# Patient Record
Sex: FEMALE | Race: BLACK OR AFRICAN AMERICAN | NOT HISPANIC OR LATINO | Employment: OTHER | URBAN - METROPOLITAN AREA
[De-identification: names, ages, dates, MRNs, and addresses within clinical notes are randomized per-mention and may not be internally consistent; named-entity substitution may affect disease eponyms.]

---

## 2017-09-25 ENCOUNTER — CONVERSION ENCOUNTER (OUTPATIENT)
Dept: GENERAL RADIOLOGY | Facility: HOSPITAL | Age: 67
End: 2017-09-25

## 2018-09-27 ENCOUNTER — CONVERSION ENCOUNTER (OUTPATIENT)
Dept: GENERAL RADIOLOGY | Facility: HOSPITAL | Age: 68
End: 2018-09-27

## 2018-10-25 ENCOUNTER — CONVERSION ENCOUNTER (OUTPATIENT)
Dept: GENERAL RADIOLOGY | Facility: HOSPITAL | Age: 68
End: 2018-10-25

## 2019-01-01 ENCOUNTER — HOSPITAL ENCOUNTER (OUTPATIENT)
Dept: CARDIAC REHAB | Facility: HOSPITAL | Age: 69
Setting detail: RECURRING SERIES
Discharge: HOME | End: 2019-02-01
Attending: SPECIALIST

## 2019-07-11 ENCOUNTER — HOSPITAL ENCOUNTER (OUTPATIENT)
Dept: CARDIOLOGY | Facility: HOSPITAL | Age: 69
Discharge: HOME | End: 2019-07-11

## 2019-08-15 ENCOUNTER — HOSPITAL ENCOUNTER (OUTPATIENT)
Dept: CARDIOLOGY | Facility: HOSPITAL | Age: 69
Discharge: HOME | End: 2019-08-15

## 2019-11-14 ENCOUNTER — HOSPITAL ENCOUNTER (OUTPATIENT)
Dept: GENERAL RADIOLOGY | Facility: HOSPITAL | Age: 69
Discharge: HOME | End: 2019-11-14
Attending: NURSE PRACTITIONER

## 2020-01-29 ENCOUNTER — HOSPITAL ENCOUNTER (OUTPATIENT)
Dept: GENERAL RADIOLOGY | Facility: HOSPITAL | Age: 70
Discharge: HOME | End: 2020-01-29
Attending: NURSE PRACTITIONER

## 2020-02-04 ENCOUNTER — HOSPITAL ENCOUNTER (OUTPATIENT)
Dept: ULTRASOUND IMAGING | Facility: HOSPITAL | Age: 70
Discharge: HOME | End: 2020-02-04
Attending: NURSE PRACTITIONER

## 2020-02-13 ENCOUNTER — OFFICE VISIT CONVERTED (OUTPATIENT)
Dept: OTHER | Facility: HOSPITAL | Age: 70
End: 2020-02-13
Attending: SURGERY

## 2020-02-13 ENCOUNTER — HOSPITAL ENCOUNTER (OUTPATIENT)
Dept: ONCOLOGY | Facility: HOSPITAL | Age: 70
Discharge: HOME | End: 2020-02-13
Attending: INTERNAL MEDICINE

## 2020-02-19 ENCOUNTER — HOSPITAL ENCOUNTER (OUTPATIENT)
Dept: MRI IMAGING | Facility: HOSPITAL | Age: 70
Discharge: HOME | End: 2020-02-19
Attending: SURGERY

## 2020-02-19 LAB
CREAT BLD-MCNC: 0.8 MG/DL (ref 0.6–1.4)
GFR SERPLBLD BASED ON 1.73 SQ M-ARVRAT: >60 ML/MIN/{1.73_M2}

## 2020-02-27 ENCOUNTER — HOSPITAL ENCOUNTER (OUTPATIENT)
Dept: PHYSICAL THERAPY | Facility: CLINIC | Age: 70
Setting detail: RECURRING SERIES
Discharge: HOME | End: 2020-06-16
Attending: SURGERY

## 2020-03-05 ENCOUNTER — HOSPITAL ENCOUNTER (OUTPATIENT)
Dept: MRI IMAGING | Facility: HOSPITAL | Age: 70
Discharge: HOME | End: 2020-03-05
Attending: SURGERY

## 2020-03-27 ENCOUNTER — HOSPITAL ENCOUNTER (OUTPATIENT)
Dept: PERIOP | Facility: HOSPITAL | Age: 70
Setting detail: HOSPITAL OUTPATIENT SURGERY
Discharge: HOME | End: 2020-03-27
Attending: SURGERY

## 2020-03-27 LAB
GLUCOSE BLD-MCNC: 138 MG/DL (ref 65–99)
GLUCOSE BLD-MCNC: 139 MG/DL (ref 65–99)

## 2020-04-06 ENCOUNTER — CONVERSION ENCOUNTER (OUTPATIENT)
Dept: SURGERY | Facility: CLINIC | Age: 70
End: 2020-04-06

## 2020-04-06 ENCOUNTER — OFFICE VISIT CONVERTED (OUTPATIENT)
Dept: SURGERY | Facility: CLINIC | Age: 70
End: 2020-04-06
Attending: SURGERY

## 2020-04-07 ENCOUNTER — HOSPITAL ENCOUNTER (OUTPATIENT)
Dept: PERIOP | Facility: HOSPITAL | Age: 70
Setting detail: HOSPITAL OUTPATIENT SURGERY
Discharge: HOME | End: 2020-04-07
Attending: SURGERY

## 2020-04-07 LAB
GLUCOSE BLD-MCNC: 103 MG/DL (ref 65–99)
GLUCOSE BLD-MCNC: 107 MG/DL (ref 65–99)

## 2020-04-16 ENCOUNTER — OFFICE VISIT CONVERTED (OUTPATIENT)
Dept: SURGERY | Facility: CLINIC | Age: 70
End: 2020-04-16
Attending: SURGERY

## 2020-04-22 ENCOUNTER — HOSPITAL ENCOUNTER (OUTPATIENT)
Dept: ONCOLOGY | Facility: HOSPITAL | Age: 70
Discharge: HOME | End: 2020-04-22
Attending: INTERNAL MEDICINE

## 2020-04-22 ENCOUNTER — OFFICE VISIT CONVERTED (OUTPATIENT)
Dept: ONCOLOGY | Facility: HOSPITAL | Age: 70
End: 2020-04-22
Attending: INTERNAL MEDICINE

## 2020-04-23 ENCOUNTER — HOSPITAL ENCOUNTER (OUTPATIENT)
Dept: RADIATION ONCOLOGY | Facility: HOSPITAL | Age: 70
Setting detail: RECURRING SERIES
Discharge: STILL ADMITTED OR ADMINISTRATIVE | End: 2020-07-01
Attending: RADIOLOGY

## 2020-04-24 LAB — 1,25(OH)2D3 SERPL-MCNC: 38.9 PG/ML (ref 19.9–79.3)

## 2020-05-22 ENCOUNTER — OFFICE VISIT CONVERTED (OUTPATIENT)
Dept: ONCOLOGY | Facility: HOSPITAL | Age: 70
End: 2020-05-22
Attending: INTERNAL MEDICINE

## 2020-05-22 ENCOUNTER — HOSPITAL ENCOUNTER (OUTPATIENT)
Dept: ONCOLOGY | Facility: HOSPITAL | Age: 70
Discharge: HOME | End: 2020-05-22
Attending: INTERNAL MEDICINE

## 2020-08-03 ENCOUNTER — OFFICE VISIT CONVERTED (OUTPATIENT)
Dept: ONCOLOGY | Facility: HOSPITAL | Age: 70
End: 2020-08-03
Attending: INTERNAL MEDICINE

## 2020-08-03 ENCOUNTER — HOSPITAL ENCOUNTER (OUTPATIENT)
Dept: ONCOLOGY | Facility: HOSPITAL | Age: 70
Discharge: HOME | End: 2020-08-03
Attending: INTERNAL MEDICINE

## 2020-08-10 ENCOUNTER — HOSPITAL ENCOUNTER (OUTPATIENT)
Dept: MAMMOGRAPHY | Facility: HOSPITAL | Age: 70
Discharge: HOME | End: 2020-08-10
Attending: INTERNAL MEDICINE

## 2020-09-25 ENCOUNTER — HOSPITAL ENCOUNTER (OUTPATIENT)
Dept: RADIATION ONCOLOGY | Facility: HOSPITAL | Age: 70
Discharge: HOME | End: 2020-09-25
Attending: RADIOLOGY

## 2020-10-06 ENCOUNTER — HOSPITAL ENCOUNTER (OUTPATIENT)
Dept: CARDIOLOGY | Facility: HOSPITAL | Age: 70
Discharge: HOME | End: 2020-10-06
Attending: SURGERY

## 2020-10-21 ENCOUNTER — OFFICE VISIT CONVERTED (OUTPATIENT)
Dept: RADIATION ONCOLOGY | Facility: HOSPITAL | Age: 70
End: 2020-10-21

## 2020-11-06 ENCOUNTER — HOSPITAL ENCOUNTER (OUTPATIENT)
Dept: CARDIAC REHAB | Facility: HOSPITAL | Age: 70
Setting detail: RECURRING SERIES
Discharge: HOME | End: 2020-11-06
Attending: SURGERY

## 2020-11-06 LAB
GLUCOSE BLD-MCNC: 179 MG/DL (ref 65–99)
GLUCOSE BLD-MCNC: 193 MG/DL (ref 65–99)

## 2020-11-09 LAB
GLUCOSE BLD-MCNC: 142 MG/DL (ref 65–99)
GLUCOSE BLD-MCNC: 168 MG/DL (ref 65–99)

## 2020-11-11 LAB
GLUCOSE BLD-MCNC: 103 MG/DL (ref 65–99)
GLUCOSE BLD-MCNC: 136 MG/DL (ref 65–99)

## 2020-11-16 LAB
GLUCOSE BLD-MCNC: 283 MG/DL (ref 65–99)
GLUCOSE BLD-MCNC: 302 MG/DL (ref 65–99)

## 2020-11-18 LAB
GLUCOSE BLD-MCNC: 151 MG/DL (ref 65–99)
GLUCOSE BLD-MCNC: 158 MG/DL (ref 65–99)

## 2020-11-23 LAB
GLUCOSE BLD-MCNC: 184 MG/DL (ref 65–99)
GLUCOSE BLD-MCNC: 196 MG/DL (ref 65–99)

## 2020-11-25 LAB
GLUCOSE BLD-MCNC: 146 MG/DL (ref 65–99)
GLUCOSE BLD-MCNC: 195 MG/DL (ref 65–99)

## 2020-12-02 LAB
GLUCOSE BLD-MCNC: 191 MG/DL (ref 65–99)
GLUCOSE BLD-MCNC: 227 MG/DL (ref 65–99)

## 2020-12-09 LAB
GLUCOSE BLD-MCNC: 145 MG/DL (ref 65–99)
GLUCOSE BLD-MCNC: 187 MG/DL (ref 65–99)

## 2020-12-14 ENCOUNTER — HOSPITAL ENCOUNTER (OUTPATIENT)
Dept: ONCOLOGY | Facility: HOSPITAL | Age: 70
Discharge: HOME | End: 2020-12-14
Attending: INTERNAL MEDICINE

## 2020-12-14 ENCOUNTER — OFFICE VISIT CONVERTED (OUTPATIENT)
Dept: RADIATION ONCOLOGY | Facility: HOSPITAL | Age: 70
End: 2020-12-14
Attending: INTERNAL MEDICINE

## 2020-12-14 LAB
GLUCOSE BLD-MCNC: 199 MG/DL (ref 65–99)
GLUCOSE BLD-MCNC: 235 MG/DL (ref 65–99)

## 2020-12-21 LAB
GLUCOSE BLD-MCNC: 132 MG/DL (ref 65–99)
GLUCOSE BLD-MCNC: 93 MG/DL (ref 65–99)

## 2020-12-23 LAB — GLUCOSE BLD-MCNC: 64 MG/DL (ref 65–99)

## 2021-01-20 LAB
GLUCOSE BLD-MCNC: 156 MG/DL (ref 65–99)
GLUCOSE BLD-MCNC: 197 MG/DL (ref 65–99)

## 2021-01-22 LAB
GLUCOSE BLD-MCNC: 204 MG/DL (ref 65–99)
GLUCOSE BLD-MCNC: 237 MG/DL (ref 65–99)

## 2021-01-29 LAB
GLUCOSE BLD-MCNC: 102 MG/DL (ref 65–99)
GLUCOSE BLD-MCNC: 141 MG/DL (ref 65–99)

## 2021-03-29 ENCOUNTER — HOSPITAL ENCOUNTER (OUTPATIENT)
Dept: MAMMOGRAPHY | Facility: HOSPITAL | Age: 71
Discharge: HOME | End: 2021-03-29
Attending: INTERNAL MEDICINE

## 2021-03-31 ENCOUNTER — HOSPITAL ENCOUNTER (OUTPATIENT)
Dept: RADIATION ONCOLOGY | Facility: HOSPITAL | Age: 71
Discharge: HOME | End: 2021-03-31
Attending: NURSE PRACTITIONER

## 2021-04-09 ENCOUNTER — HOSPITAL ENCOUNTER (OUTPATIENT)
Dept: GENERAL RADIOLOGY | Facility: HOSPITAL | Age: 71
Discharge: HOME | End: 2021-04-09
Attending: NURSE PRACTITIONER

## 2021-05-10 NOTE — H&P
History and Physical      Patient Name: Gini Edmondson   Patient ID: [id number]   Sex: Female   YOB: 1950    Primary Care Provider: Cheyenne Russell NP   Referring Provider: Cheyenne Russell NP    Visit Date: April 16, 2020    Provider: Dahlia Allred MD   Location: [facility]   Location Address: [address] [place] [address]  [id number]   Location Phone: [phone]          Chief Complaint  · Follow Up Surgery      History Of Present Illness  Gini Edmondson is a 69 year old /Black female who is here today for follow up of: Breast Mass Removal Left Side.   She is doing well-status post surgery. Her re-excised margins were negative.       Past Medical History  Asthma; Chronic Obstructive Pulmonary Disease; Depression; Diabetes mellitus, type II; Goiter; Hypertension, essential; Raynaud's Syndrome         Past Surgical History  Cholecystectomy; Triple coronary bypass         Medication List  aspirin Oral; atorvastatin oral; biotin oral; Fish Oil oral; Hair,Skin and Nails oral; magnesium oral; meloxicam oral; metformin oral; metoprolol succinate oral; multivitamin oral; Novolog Flexpen 100 unit/mL Subcutaneous Insulin Pen; pentoxifylline 400 mg oral tablet extended release; ProAir HFA 90 mcg/Actuation Inhalation HFA Aerosol Inhaler; Senna Laxative 8.6 mg oral tablet; Stool Softener oral; vitamin B12-folic acid 500-400 mcg oral tablet; Vitamin D 1,000 unit Oral Capsule         Allergy List  SULFA (SULFONAMIDES)       Allergies Reconciled  Social History  *Denies Alcohol Use; Tobacco (Former)         Review of Systems  · Constitutional  o Denies  o : fever, chills  · Eyes  o Denies  o : yellowish discoloration of the eyes, eye pain  · HENT  o Denies  o : difficulty swallowing, hoarseness  · Breasts  o * See HPI  · Cardiovascular  o Denies  o : chest pain on exertion, irregular heart beats  · Respiratory  o Denies  o : shortness of breath,  "cough  · Gastrointestinal  o Denies  o : nausea, vomiting, diarrhea, constipation  · Integument  o Admits  o : see HPI for surgical incision healing  · Neurologic  o Denies  o : tingling or numbness, loss of balance  · Musculoskeletal  o Denies  o : joint pain, back pain  · Endocrine  o Denies  o : weight gain, weight loss  · All Others Negative      Vitals  Date Time BP Position Site L\R Cuff Size HR RR TEMP (F) WT  HT  BMI kg/m2 BSA m2 O2 Sat HC       04/16/2020 10:23 AM       16  185lbs 16oz 5'  2\" 34.02 1.92           Physical Examination  · Constitutional  o Appearance  o : well developed/well nourished patient in no apparent distress  · Respiratory  o Inspection of Chest  o : equal breaths bilaterally  · Gastrointestinal  o Abdominal Examination  o : soft/nontender, nondistended, no organomegaly appreciated  · Post Surgical Incision  o Surgical wound  o : healing well, no erythema          Assessment  · Postoperative Exam Following Surgery     V67.00/Z09      Plan  · Medications  o Medications have been Reconciled  o Transition of Care or Provider Policy  · Instructions  o Return to office with any issues.  o F/U PRN  o Will need to see oncology and rad onc            Electronically Signed by: Dahlia Allred MD -Author on April 16, 2020 02:16:05 PM  "

## 2021-05-10 NOTE — H&P
History and Physical      Patient Name: Gini Edmondson   Patient ID: [id number]   Sex: Female   YOB: 1950    Primary Care Provider: Cheyenne Russell NP   Referring Provider: Cheyenne Russell NP    Visit Date: April 6, 2020    Provider: Dahlia Allred MD   Location: [facility]   Location Address: [address] [place] [address]  [id number]   Location Phone: [phone]          Chief Complaint  · Follow Up Surgery  · Pre-Surgical History and Physical Examination for [facility]  · Consents for Surgery      History Of Present Illness  Gini Edmondson is a 69 year old /Black female who is here today for follow up of: left lumpectomy with sentinel node.   She is doing well-status post surgery. Pathology is negative nodes but a positive superior medial margin.       Past Medical History  Asthma; Chronic Obstructive Pulmonary Disease; Depression; Diabetes mellitus, type II; Goiter; Hypertension, essential; Raynaud's Syndrome         Past Surgical History  Cholecystectomy; Triple coronary bypass         Medication List  aspirin Oral; atorvastatin oral; biotin oral; Fish Oil oral; Hair,Skin and Nails oral; magnesium oral; meloxicam oral; metformin oral; metoprolol succinate oral; multivitamin oral; Novolog Flexpen 100 unit/mL Subcutaneous Insulin Pen; pentoxifylline 400 mg oral tablet extended release; ProAir HFA 90 mcg/Actuation Inhalation HFA Aerosol Inhaler; Senna Laxative 8.6 mg oral tablet; Stool Softener oral; vitamin B12-folic acid 500-400 mcg oral tablet; Vitamin D 1,000 unit Oral Capsule         Allergy List  SULFA (SULFONAMIDES)         Social History  *Denies Alcohol Use; Tobacco (Former)         Review of Systems  · Constitutional  o Denies  o : fever, chills  · Eyes  o Denies  o : yellowish discoloration of the eyes, eye pain  · HENT  o Denies  o : difficulty swallowing, hoarseness  · Cardiovascular  o Denies  o : chest pain on exertion, irregular  "heart beats  · Respiratory  o Denies  o : shortness of breath, cough  · Gastrointestinal  o Denies  o : nausea, vomiting, diarrhea, constipation  · Integument  o Admits  o : see HPI for surgical incision healing  · Neurologic  o Denies  o : tingling or numbness, loss of balance  · Musculoskeletal  o Denies  o : joint pain, back pain  · Endocrine  o Denies  o : weight gain, weight loss  · All Others Negative      Vitals  Date Time BP Position Site L\R Cuff Size HR RR TEMP (F) WT  HT  BMI kg/m2 BSA m2 O2 Sat HC       04/06/2020 09:07 AM       12  186lbs 8oz 5'  2\" 34.11 1.92           Physical Examination  · Constitutional  o Appearance  o : well developed/well nourished patient in no apparent distress  · Respiratory  o Inspection of Chest  o : equal breaths bilaterally  · Gastrointestinal  o Abdominal Examination  o : soft/nontender, nondistended, no organomegaly appreciated  · Post Surgical Incision  o Surgical wound  o : healing well, no erythema          Assessment  · Postoperative Exam Following Surgery     V67.00/Z09  · Preoperative Examination     V72.83      Plan  · Orders  o General Surgery Order (GENOR) - V67.00/Z09, V72.83 - 04/07/2020  · Medications  o Medications have been Reconciled  o Transition of Care or Provider Policy  · Instructions  o Return to office with any issues.  o ****Surgical Orders****  o ****Patient Status****  o RISK AND BENEFITS:  o Consent for surgery: Given these options, the patient has verbally expressed an understanding of the risks of surgery and finds these risks acceptable. We will proceed with surgery as soon as possible.  o Possible risks, complications, benefits, and alternatives to surgical or invasive procedure have been explained to patient and/or legal guardian.  o O.R. PREP: Per protocol  o IV: Per Anesthesia  o Please sign permit for: Re-excision of left breast superior medial margins  o Kefzol 2 grams IV on call to OR.  o The above History and Physical Examination has " been completed within 30 days of admission.            Electronically Signed by: Dahlia Allred MD -Author on April 6, 2020 09:45:02 AM

## 2021-05-15 VITALS — WEIGHT: 186 LBS | RESPIRATION RATE: 16 BRPM | HEIGHT: 62 IN | BODY MASS INDEX: 34.23 KG/M2

## 2021-05-15 VITALS — RESPIRATION RATE: 12 BRPM | BODY MASS INDEX: 34.32 KG/M2 | WEIGHT: 186.5 LBS | HEIGHT: 62 IN

## 2021-05-15 VITALS — WEIGHT: 189.5 LBS | RESPIRATION RATE: 16 BRPM | BODY MASS INDEX: 34.87 KG/M2 | HEIGHT: 62 IN

## 2021-05-28 VITALS
DIASTOLIC BLOOD PRESSURE: 71 MMHG | WEIGHT: 186.95 LBS | RESPIRATION RATE: 20 BRPM | BODY MASS INDEX: 34.4 KG/M2 | SYSTOLIC BLOOD PRESSURE: 144 MMHG | OXYGEN SATURATION: 95 % | OXYGEN SATURATION: 97 % | OXYGEN SATURATION: 98 % | BODY MASS INDEX: 34.23 KG/M2 | HEART RATE: 76 BPM | DIASTOLIC BLOOD PRESSURE: 85 MMHG | TEMPERATURE: 97.3 F | HEIGHT: 62 IN | HEART RATE: 100 BPM | HEART RATE: 87 BPM | HEIGHT: 62 IN | BODY MASS INDEX: 33.92 KG/M2 | SYSTOLIC BLOOD PRESSURE: 144 MMHG | WEIGHT: 184.3 LBS | SYSTOLIC BLOOD PRESSURE: 140 MMHG | TEMPERATURE: 97.9 F | WEIGHT: 187.17 LBS | RESPIRATION RATE: 16 BRPM | TEMPERATURE: 97.7 F | DIASTOLIC BLOOD PRESSURE: 67 MMHG

## 2021-05-28 VITALS
BODY MASS INDEX: 33.55 KG/M2 | DIASTOLIC BLOOD PRESSURE: 59 MMHG | TEMPERATURE: 97.3 F | SYSTOLIC BLOOD PRESSURE: 135 MMHG | HEART RATE: 83 BPM | RESPIRATION RATE: 18 BRPM | OXYGEN SATURATION: 96 % | WEIGHT: 183.42 LBS

## 2021-05-28 VITALS
OXYGEN SATURATION: 96 % | HEART RATE: 71 BPM | TEMPERATURE: 97.6 F | SYSTOLIC BLOOD PRESSURE: 123 MMHG | DIASTOLIC BLOOD PRESSURE: 61 MMHG | RESPIRATION RATE: 18 BRPM

## 2021-05-28 NOTE — PROGRESS NOTES
Patient: BENITA BETANCOURT     Acct: [id number]     Report: #[id number]  UNIT #: [id number]     : 1950    Encounter Date:2020  PRIMARY CARE: JEREMIAH DEVLIN  ***Signed***  --------------------------------------------------------------------------------------------------------------------  NURSE INTAKE      Visit Type      Established Patient Visit            Chief Complaint      BREAST CA HERE FOR F/U            Referring Provider/Copies To      Referring Provider:  ARLETTE DEAN MD      Primary Care Provider:  JEREMIAH DEVLIN      Copies To:   JEREMIAH DELVIN            History and Present Illness      Past Oncology Illness History      1) Breast Cancer: Invasive Lobular Carcinoma, Left UOQ, 1.3 cm; Grade 2, ER:     100%, AR: 10%, HER2: 0, Ki-67: 30%; Oncotype DX: 30            Treatment History:            1) s/p LEFT lumpectomy and SLND (3/27/20)      2) s/p re-excision (+) margin (1 mm residual dx found) (20)      3) Patient decision to forgo chemotherapy; plan for adjuvant XRT and AI therapy     (20)      4) Patient c/o signifcant hotflashes; ongoing Femara and XRT (20)      5) s/p XRT L breast: 4256 cGy (-20)      6) Ongoing Femara (8/3/20)            HPI - Oncology Interim      Ms. Betancourt comes in for f/u regarding several chronic issues:            1) Breast Cancer: Ms. Betancourt comes in for follow-up.  In the interim, she     reports that she completed adjuvant radiation therapy.  She comes in for     surveillance regarding treatment.  She denies fevers or chills or sweats.  She d    oes complain of asthenia.  She describes it as a feeling of tiredness.  It is of    mild severity.  No other modifying factors or associated signs or symptoms.            2) AI Therapy: Ms. Betancourt does complain of hot flashes and weight gain.  She     attributes it to treatment.  It is of mild severity.  No other modifying factors    or associated signs or symptoms.  She otherwise  denies significant arthralgias     or myalgias.            3) DM: Ms. Edmondson denies polyuria or polydipsia.            Clinical Trial Participant      No            ECOG Performance Status      1            PAST, FAMILY   Past Medical History      Past Medical History:  COPD, Depression, Diabetes Type 2, Heart Attack,     Hypertension      Hematology/Oncology (F):  Breast Cancer            Past Surgical History      Biopsy, Cataract Surgery (LEFT), Lumpectomy (LEFT BREAST)            TRIPLE BYPASS            Family History      Family History:  Breast Cancer (MATERNAL AND PATERNAAL AUNTS), Prostate Cancer     (FATHER)            PANCREATIC- BROTHER, LUPUS-SISTER; DIABETIC-FATHER, BROTHERS, AUNTS,      UNCLES, COUSINS            Social History      Lives independently:  No            Tobacco Use      Tobacco status:  Current every day smoker      Smoking packs/day:  0.5      Quit status:  Considering quitting            Alcohol Use      Alcohol intake:  None            Substance Use      Substance use:  Denies use            REVIEW OF SYSTEMS      General:  Admits: Fatigue;          Denies: Appetite Change, Fever, Night Sweats, Weight Gain, Weight Loss      Eye:  Denies Blurred Vision, Denies Corrective Lenses, Denies Diplopia, Denies     Vision Changes      ENT:  Denies Headache, Denies Hearing Loss, Denies Hoarseness, Denies Sore     Throat      Cardiovascular:  Admits Chest Pain; Denies Palpitations      Respiratory:  Denies: Cough, Coughing Blood, Productive Cough, Shortness of Air,    Wheezing      Gastrointestinal:  Denies Bloody Stools, Denies Constipation, Denies Diarrhea,     Denies Nausea/Vomiting, Denies Problem Swallowing, Denies Unable to Control     Bowels      Genitourinary:  Denies Blood in Urine, Denies Incontinence, Denies Painful     Urination      Musculoskeletal:  Admits Back Pain; Denies Muscle Pain, Denies Painful Joints      Integumentary:  Denies Itching, Denies Lesions, Denies Rash       Neurologic:  Denies Dizziness; Admits Numbness\Tingling (DIABETIC NEUROPATHY);     Denies Seizures      Psychiatric:  Denies Anxiety, Denies Depression      Endocrine:  Denies Cold Intolerance, Denies Heat Intolerance      Hematologic/Lymphatic:  Denies Bruising, Denies Bleeding, Denies Enlarged Lymph     Nodes      Reproductive:  Denies: Menopause, Heavy Periods, Pregnant, Still Menstruating            VITAL SIGNS AND SCORES      Vitals      Height 5 ft 2 in / 157.48 cm      Weight 184 lbs 4.873 oz / 83.6 kg      BSA 1.85 m2      BMI 33.7 kg/m2      Temperature 97.9 F / 36.61 C - Temporal      Pulse 100      Respirations 20      Blood Pressure 144/67 Sitting, Left Arm      Pulse Oximetry 95%, ROOM AIR            Pain Score      Experiencing any pain?:  No      Pain Scale Used:  Numerical      Pain Intensity:  0            Fatigue Score      Experiencing any fatigue?:  Yes      Fatigue (0-10 scale):  10            EXAM      General Appearance:  Positive for: Alert, Oriented x3      Eye:  Positive for: Anicteric Sclerae, PERRLA      HEENT:  Negative for: Scleral Icterus, Thrush      Neck:  Positive for: Supple      Respiratory:  Negative for: Rales, Rhochi      Abdomen/Gastro:  Positive for: Soft      Cardiovascular:  Positive for: RRR      Psychiatric:  Positive for: AAO X 3      Lower Extremities:  Negative for: Edema            PREVENTION      Hx Influenza Vaccination:  Yes      Date Influenza Vaccine Given:  Oct 1, 2019      Influenza Vaccine Declined:  No      2 or More Falls in Past Year?:  No      Fall Past Year with Injury?:  No      Hx Pneumococcal Vaccination:  No      Encouraged to follow-up with:  PCP regarding preventative exams.      Chart initiated by      LAMIN BURNETT            ALLERGY/MEDS      Allergies      Coded Allergies:             CILOSTAZOL (Verified  Allergy, Unknown, full body rash and itching,     5/22/20)           SULFA (SULFONAMIDE ANTIBIOTICS) (Verified  Allergy, Unknown, rash,  5/22/20)            Medications            Letrozole (Femara*) 2.5 Mg Tablet      2.5 MG PO QDAY, #90 TAB 3 Refills         Prov: CHRIS SIMENTAL         6/16/20       Atorvastatin Calcium (Lipitor*) 10 Mg Tablet      10 MG PO HS, #30 TAB 0 Refills         Reported         3/26/20       Aspirin Chew (Aspirin Baby) 81 Mg Tab.chew      81 MG PO QDAY, #30 TAB.CHEW 0 Refills         Reported         3/26/20       Cholecalciferol (Vitamin D3) (Vitamin D3) 1,000 Unit Tablet      1000 UNITS PO QDAY for 30 Days, #30 TAB         Reported         3/26/20       Biotin (Biotin) 5 Mg Tab      5 MG PO QDAY, #30 TAB         Reported         3/26/20       Magnesium Oxide (Magnesium Oxide) 500 Mg Capsule      500 MG PO QDAY, CAP         Reported         3/26/20       (Hair Skin Nails)   No Conflict Check               Reported         3/26/20       Metoprolol Tartrate (Metoprolol Tartrate) 25 Mg Tablet      25 MG PO BID, #60 TAB 0 Refills         Reported         5/31/18       Meloxicam (Meloxicam*) 7.5 Mg Tablet      7.5 MG PO HS, #30 TAB 0 Refills         Reported         5/31/18       Insulin Asp/Prt/Insulin Aspart (novoLOG Mix 70/30 FLEXPEN) 100 Unit/1 Ml     Insuln.pen      50 UNITS SUBQ BID INSULIN, #1 BOX 0 Refills         Reported         5/31/18       Cyanocobalamin (Vitamin B-12*) 500 Mcg Tab      500 MCG PO QDAY, #30 TAB         Reported         3/23/18       Omega-3 Fatty Acids/Fish Oil (Fish Oil 1000 Mg) 1 Each Capsule      1 GM PO QDAY, #30 CAP 0 Refills         Reported         3/23/18       metFORMIN HCl (Glucophage) 500 Mg Tablet      500 MG PO BID, #60 TAB 0 Refills         Reported         3/23/18       Docusate Sodium (Colace) 100 Mg Capsule      100 MG PO QDAY, #30 CAP 0 Refills         Reported         3/23/18       Albuterol Sulfate (Albuterol Sulfate) 1.25 Mg/3 Ml Vial.neb      1.25 MG INH Q6H PRN for SHORTNESS OF BREATH, #120 NEB 0 Refills         Reported         3/23/18       MDI-Albuterol (Proair HFA)  8.5 Gm Hfa.aer.ad      2 PUFFS INH Q4H PRN for SHORTNESS OF BREATH, #1 MDI 0 Refills         Reported         3/23/18       Pentoxifylline (Pentoxifylline) 400 Mg Tablet.er      400 MG PO BID, TAB.SR         Reported         3/23/18      Medications Reviewed:  No Changes made to meds            IMPRESSION/PLAN      Impression      1) Breast Cancer: Ms. Edmondson is a 70-year-old female with a history of breast     cancer.  She is tolerating aromatase inhibitor therapy adequately.  She will     continue on treatment as tolerated.  She will follow-up with radiation oncology     in 1 month.  She will follow-up with myself in roughly 4 months for surveillance    as is consistent with the NCCN guidelines.  She will call with any new or     worsening symptoms in the interim.  She indicates understanding and is amenable     to this plan.            2) AI Therapy: She is tolerating Femara treatment adequately.  However, I did     caution her that her current symptoms could be related to treatment, and it     would be reasonable to consider an alternative treatment such as Aromasin or     tamoxifen.  Her preference is to continue with her current treatment unchanged.     She will contact my office if she elects to change therapy.            3) DM: This is clinically stable.  She will follow with primary care for this.            Diagnosis      Diabetes         Type 2 diabetes mellitus without complication, unspecified whether long term        insulin use - E11.9         Diabetes mellitus type: type 2         Diabetes mellitus long term insulin use: unspecified long term insulin use        status         Diabetes mellitus complication status: without complication            Aromatase inhibitor use - Z79.811            Breast cancer         Malignant neoplasm of upper-outer quadrant of left breast in female, estrogen       receptor positive - C50.412, Z17.0         Breast location: upper outer quadrant of breast         Estrogen  receptor status: positive         Patient sex: female         Laterality: left            Notes      New Diagnostics      * Bone Densitometry DEXA, As Soon As Possible         Dx: Aromatase inhibitor use - Z79.811            Plan      1) Continue with Femara            2) f/u with XRT one month            3) RTC 4-5 months for surveillance            4) Bone Density; call with results            Patient Education      Patient Education Provided:  Yes            Electronically signed by CHRIS SIMENTAL  08/03/2020 10:48       Disclaimer: Converted document may not contain table formatting or lab diagrams. Please see [employer] [facility] for the authenticated document.

## 2021-05-28 NOTE — PROGRESS NOTES
"Patient: BENITA BETANCOURT     Acct: [id number]     Report: #[id number]  UNIT #: [id number]     : 1950    Encounter Date:2020  PRIMARY CARE: JEREMIAH DEVLIN  ***Signed***  --------------------------------------------------------------------------------------------------------------------  NURSE INTAKE      Visit Type      Established Patient Visit            Chief Complaint      BREAST CA            Referring Provider/Copies To      Referring Provider:  MICHELLE MERINO      Primary Care Provider:  JEREMIAH DEVLIN      Copies To:   JEREMIAH DEVLIN            History and Present Illness      Past Oncology Illness History      Breast Cancer: Invasive Lobular Carcinoma, Left UOQ, 1.3 cm; Grade 2, ER: 100%,     ND: 10%, HER2: 0, Ki-67: 30%; Oncotype DX: 30            Treatment History:      - s/p LEFT lumpectomy and SLND (3/27/20)      - s/p re-excision (+) margin (1 mm residual dx found) (20)      - completed adjuvant XRT L breast: 4256 cGy (-20)      - Started Femara May 2020      - DEXA 2020-normal bone density            HPI - Oncology Interim      Patient comes in today for routine follow-up regarding her history of breast     cancer.  She remains on Femara but complains of worsening side effects including    hot flashes that are on and off every day.  She is also \"getting mean\" and feels    like throwing things that her  at times.  On further questioning she     revealed that she used to be on Escitalopram (Lexapro) but this medication was     accidentally dropped from her medication list when she switched primary care     providers.  She since discovered that but decided 2 try staying off of it.  We     discussed how she may need 2 be back on an antidepressant medication.  She     certainly has underlying difficulties with depression which is only made worse     by her aromatase inhibitor.  I offered several options including continuing the     current treatment " "plan.  The second option was 2 switch 2 different aromatase     inhibitor.  The patient did not like this option because she states that she     would like a stick with \"the devil she knows\".  The third option I gave her was     a continue the same aromatase inhibitor and start an antidepressant medication.     Typically I start venlafaxine but the patient thought it best ago back on her     previous dose of Lexapro.            She is due for her annual screening mammogram.  The patient is aware that this     was coming up but has not had it scheduled by her primary care provider.            Clinical Trial Participant      No            ECOG Performance Status      1            PAST, FAMILY   Past Medical History      Past Medical History:  COPD, Depression, Diabetes Type 2, Heart Attack,     Hypertension      Hematology/Oncology (F):  Breast Cancer            Past Surgical History      Biopsy, Cataract Surgery, Lumpectomy            TRIPLE BYPASS            Family History      Family History:  Breast Cancer, Prostate Cancer            PANCREATIC- BROTHER, LUPUS-SISTER; DIABETIC-FATHER, BROTHERS, AUNTS,      UNCLES, COUSINS            Social History      Lives independently:  Yes      Occupation:  retired            Tobacco Use      Tobacco status:  Current every day smoker (1 pack every 3 days)      Smoking packs/day:  0.5            Alcohol Use      Alcohol intake:  None            Substance Use      Substance use:  Denies use            REVIEW OF SYSTEMS      General:  Admits: Fatigue, Weight Gain;          Denies: Appetite Change, Fever, Night Sweats, Weight Loss      Other      Hot flashes      Eye:  Denies Blurred Vision, Denies Corrective Lenses, Denies Diplopia, Denies     Vision Changes      ENT:  Denies Headache, Denies Hearing Loss, Denies Hoarseness, Denies Sore     Throat      Cardiovascular:  Denies Chest Pain, Denies Palpitations      Respiratory:  Denies: Cough, Coughing Blood, Productive Cough, " Shortness of Air,    Wheezing      Gastrointestinal:  Denies Bloody Stools, Denies Constipation, Denies Diarrhea,     Denies Nausea/Vomiting, Denies Problem Swallowing, Denies Unable to Control     Bowels      Genitourinary:  Denies Blood in Urine, Denies Incontinence, Denies Painful     Urination      Musculoskeletal:  Denies Back Pain, Denies Muscle Pain, Denies Painful Joints      Integumentary:  Denies Itching, Denies Lesions, Denies Rash      Neurologic:  Denies Dizziness; Admits Numbness\Tingling (FEET, DUE TO DIABETES);    Denies Seizures      Psychiatric:  Denies Anxiety; Admits Depression      Other      Irritability      Endocrine:  Denies Cold Intolerance, Denies Heat Intolerance      Hematologic/Lymphatic:  Denies Bruising, Denies Bleeding, Denies Enlarged Lymph     Nodes      Reproductive:  Denies: Menopause, Heavy Periods, Pregnant, Still Menstruating            VITAL SIGNS AND SCORES      Vitals      Weight 183 lbs 6.764 oz / 83.2 kg      Temperature 97.3 F / 36.28 C - Temporal      Pulse 83      Respirations 18      Blood Pressure 135/59 Sitting, Right Arm      Pulse Oximetry 96%, RM AIR            Pain Score      Experiencing any pain?:  No      Pain Scale Used:  Numerical      Pain Intensity:  0            Fatigue Score      Experiencing any fatigue?:  Yes      Fatigue (0-10 scale):  8            EXAM      General: Alert, cooperative, no acute distress      Eyes: Anicteric sclera, PERRLA      HEENT: Oropharynx clear, no exudates      Respiratory: CTAB, normal respiratory effort      Abdomen: Normal active bowel sounds, no tenderness, no distention      Cardiovascular: RRR, no murmur, no peripheral edema      Skin: Normal tone, no rash, no lesions      Psychiatric: Appropriate affect, intact judgment      Neurologic: No focal sensory or motor deficits, no weakness, numbness, dizziness      Musculoskeletal: Normal muscle strength, normal muscle tone      Extremities: No clubbing, cyanosis, or  deformities            PREVENTION      Hx Influenza Vaccination:  Yes      Date Influenza Vaccine Given:  Sep 1, 2020      Influenza Vaccine Declined:  No      2 or More Falls in Past Year?:  No      Fall Past Year with Injury?:  No      Hx Pneumococcal Vaccination:  Yes (10/20/2020 at PCP)      Encouraged to follow-up with:  PCP regarding preventative exams.      Chart initiated by      JOE SMITH MA            ALLERGY/MEDS      Allergies      Coded Allergies:             CILOSTAZOL (Verified  Allergy, Unknown, full body rash and itching,     12/14/20)           SULFA (SULFONAMIDE ANTIBIOTICS) (Verified  Allergy, Unknown, rash,     12/14/20)            Medications      Last Reconciled on 12/14/20 17:16 by MIRELLA ALBARADO      Escitalopram Oxalate (Escitalopram Oxalate*) 10 Mg Tablet      10 MG PO QDAY, #30 TAB 3 Refills         Prov: MIRELLA ALBARADO         12/14/20       MDI-Albuterol (Ventolin HFA) 8 Gm Hfa.aer.ad      2 PUFFS INH Q4H PRN for SHORTNESS OF BREATH, #1 MDI 0 Refills         Reported         11/6/20       MDI-Albuterol (Ventolin HFA) 8 Gm Hfa.aer.ad      1 PUFFS INH Q4H PRN for SHORTNESS OF BREATH, #1 MDI 0 Refills         Reported         11/6/20       SITagliptin (Januvia) 100 Mg Tablet      100 MG PO QDAY, #30 TAB 0 Refills         Reported         11/6/20       Letrozole (Femara*) 2.5 Mg Tablet      2.5 MG PO QDAY, #90 TAB 3 Refills         Prov: CHRIS SIMENTAL         6/16/20       Atorvastatin Calcium (Lipitor*) 10 Mg Tablet      20 MG PO HS, #30 TAB 0 Refills         Reported         3/26/20       Aspirin Chew (Aspirin Baby) 81 Mg Tab.chew      81 MG PO QDAY, #30 TAB.CHEW 0 Refills         Reported         3/26/20       Cholecalciferol (Vitamin D3) (Vitamin D3) 1,000 Unit Tablet      1000 UNITS PO QDAY for 30 Days, #30 TAB         Reported         3/26/20       Biotin (Biotin) 5 Mg Tab      5 MG PO QDAY, #30 TAB         Reported         3/26/20       Magnesium Oxide (Magnesium Oxide) 500 Mg  Capsule      500 MG PO QDAY, CAP         Reported         3/26/20       (Hair Skin Nails)   No Conflict Check               Reported         3/26/20       Metoprolol Tartrate (Metoprolol Tartrate) 25 Mg Tablet      25 MG PO BID, #60 TAB 0 Refills         Reported         5/31/18       Insulin Asp/Prt/Insulin Aspart (novoLOG Mix 70/30 FLEXPEN) 100 Unit/1 Ml     Insuln.pen      50 UNITS SUBQ BID INSULIN, #1 BOX 0 Refills         Reported         5/31/18       Cyanocobalamin (Vitamin B-12*) 500 Mcg Tab      500 MCG PO QDAY, #30 TAB         Reported         3/23/18       Omega-3 Fatty Acids/Fish Oil (Fish Oil 1000 Mg) 1 Each Capsule      1 GM PO QDAY, #30 CAP 0 Refills         Reported         3/23/18       Docusate Sodium (Colace) 100 Mg Capsule      100 MG PO QDAY, #30 CAP 0 Refills         Reported         3/23/18       Albuterol Sulfate (Albuterol Sulfate) 1.25 Mg/3 Ml Vial.neb      1.25 MG INH Q6H PRN for SHORTNESS OF BREATH, #120 NEB 0 Refills         Reported         3/23/18       MDI-Albuterol (Proair HFA) 8.5 Gm Hfa.aer.ad      2 PUFFS INH Q4H PRN for SHORTNESS OF BREATH, #1 MDI 0 Refills         Reported         3/23/18       Pentoxifylline (Pentoxifylline) 400 Mg Tablet.er      400 MG PO BID, TAB.SR         Reported         3/23/18      Medications Reviewed:  No Changes made to meds            IMPRESSION/PLAN      Diagnosis      Breast screening - Z12.39            Notes      New Medications      * ESCITALOPRAM OXALATE (Escitalopram Oxalate*) 10 MG TABLET: 10 MG PO QDAY #30         Dx: Breast screening - Z12.39      New Diagnostics      * Screening Mammo, SCHEDULED PROCEDURE         Dx: Breast screening - Z12.39            Plan      Left breast cancer: Diagnosed in March 2020 and treated with lumpectomy followed    by radiation and endocrine therapy.  Patient is tolerating Femara with the     exception of hot flashes and irritability.  She is also due for screening     mammogram which I will order now and  follow-up with her in 1 month.            Irritability: Likely secondary underlying depression as well as a side effect of    her endocrine therapy.  We will restart the patient back on her initial dose of     Lexapro at 10 mg daily.  I will follow-up with her regarding this issue in 1     month but asked that she see her primary care provider for management of     depression medications going forward.            Hot flashes: Secondary 2 endocrine therapy.  Antidepressant medication such as     Lexapro can likely help with this.  The typical medication would be venlafaxine     for hot flashes alone.  This is an option if she fails restarting Lexapro.            Patient Education      Patient Education Provided:  Yes            Electronically signed by MIRELLA ALBARADO  12/14/2020 17:16       Disclaimer: Converted document may not contain table formatting or lab diagrams. Please see [facility] for the authenticated document.

## 2021-05-28 NOTE — PROGRESS NOTES
Patient: BENITA BETANCOURT     Acct: [id number]     Report: #[id number]  UNIT #: [id number]     : 1950    Encounter Date:10/21/2020  PRIMARY CARE: JEREMIAH DEVLIN  ***Signed***  --------------------------------------------------------------------------------------------------------------------  Encounter Date:  Oct 21, 2020      Care Plan Delivery Method      Patient Visit            Care Plan Delivered to PCP Via      Care Plan Delivered to PCP Via:  Fax            Referrals      Advanced Care Planning (living will pack given to patient)            Education Material Provided on      Care Plan, Diagnosis, Support Groups ([facility])            [employer] Acuity Tool      Acuity:  2 (43 min)      Diagnosis:        left breast invasive lobular carcinoma            Nurse Navigator Intervention            Pt seen in Survivorship clinic today. Smoking cessation discussed with      patient. Also provided patient with contact information for counselor,      Salina Tamayo. Patient reports she is not interested in taking      antidepressants any longer. Reviewed care plan with patient and will fax a      copy to her primary care provider. Patient has no further questions. CAROLYN Denney RN            Time Spent Evaluating      43            Electronically signed by XAVI DENNEY RN  10/23/2020 10:36       Disclaimer: Converted document may not contain table formatting or lab diagrams. Please see [facility] for the authenticated document.

## 2021-05-28 NOTE — PROGRESS NOTES
Patient: BENITA BETANCOURT     Acct: [id number]     Report: #[id number]  UNIT #: [id number]     : 1950    Encounter Date:2020  PRIMARY CARE: JEREMIAH DEVLIN  ***Signed***  --------------------------------------------------------------------------------------------------------------------  NURSE INTAKE      Visit Type      Established Patient Visit            Chief Complaint      BREAST CANCER            Referring Provider/Copies To      Referring Provider:  ARLETTE DEAN MD      Primary Care Provider:  JEREMIAH DEVLIN      Copies To:   JEREMIAH DEVLIN            History and Present Illness      Past Oncology Illness History      1) Breast Cancer: Invasive Lobular Carcinoma, Left UOQ, 1.3 cm; Grade 2, ER:     100%, HI: 10%, HER2: 0, Ki-67: 30%; Oncotype DX: 30            Treatment History:            1) s/p LEFT lumpectomy and SLND (3/27/20)      2) s/p re-excision (+) margin (1 mm residual dx found) (20)      3) Patient decision to forgo chemotherapy; plan for adjuvant XRT and AI therapy     (20)            HPI - Oncology Interim      Ms. Betancourt comes in for f/u regarding several chronic issues:            1) Breast Cancer: Ms. Betancourt comes in for follow-up.  In the interim, she     reports that she underwent her initial lumpectomy and sentinel lymph node     dissection, followed by a reexcision due to a positive margin.  She comes in to     review the results and to discuss adjuvant therapy.  Overall, she reports that     she is recovering well from surgery.  She does report some discomfort in the     left breast.  It is of mild severity and improving.  No other modifying factors     or associated signs or symptoms.              2) HTN: Ms. Betancourt denies chest pain or diaphoresis.            3) DM: Ms. Betancourt denies polyuria or polydipsia.            Clinical Trial Participant      No            ECOG Performance Status      1            PAST, FAMILY   Past Medical History       Past Medical History:  COPD, Depression, Diabetes Type 2, Heart Attack,     Hypertension      Hematology/Oncology (F):  Breast Cancer            Past Surgical History      Biopsy, Cataract Surgery (LEFT), Lumpectomy (LEFT BREAST)            TRIPLE BYPASS            Family History      Family History:  Breast Cancer (MATERNAL AND PATERNAAL AUNTS), Prostate Cancer     (FATHER)            PANCREATIC- BROTHER, LUPUS-SISTER; DIABETIC-FATHER, BROTHERS, AUNTS,      UNCLES, COUSINS            Social History      Lives independently:  No            Tobacco Use      Tobacco status:  Current every day smoker      Smoking packs/day:  0.5      Quit status:  Considering quitting            Alcohol Use      Alcohol intake:  None            Substance Use      Substance use:  Denies use            REVIEW OF SYSTEMS      General:  Admits: Fatigue;          Denies: Appetite Change, Fever, Night Sweats, Weight Gain, Weight Loss      Eye:  Denies Blurred Vision, Denies Corrective Lenses, Denies Diplopia, Denies     Vision Changes      ENT:  Denies Headache, Denies Hearing Loss, Denies Hoarseness, Denies Sore     Throat      Cardiovascular:  Denies Chest Pain, Denies Palpitations      Respiratory:  Denies: Cough, Coughing Blood, Productive Cough, Shortness of Air,    Wheezing      Gastrointestinal:  Denies Bloody Stools, Denies Constipation, Denies Diarrhea,     Denies Nausea/Vomiting, Denies Problem Swallowing, Denies Unable to Control Markos    wels      Genitourinary:  Denies Blood in Urine, Denies Incontinence, Denies Painful     Urination      Musculoskeletal:  Denies Back Pain, Denies Muscle Pain, Denies Painful Joints      Other      SORE ON LEFT SIDE BREAST SINCE SURGERY      Integumentary:  Denies Itching, Denies Lesions, Denies Rash      Neurologic:  Denies Dizziness, Denies Numbness\Tingling, Denies Seizures      Psychiatric:  Denies Anxiety, Denies Depression      Endocrine:  Denies Cold Intolerance, Denies Heat Intolerance       Hematologic/Lymphatic:  Denies Bruising, Denies Bleeding, Denies Enlarged Lymph     Nodes      Reproductive:  Denies: Menopause, Heavy Periods, Pregnant, Still Menstruating            VITAL SIGNS AND SCORES      Vitals      Weight 187 lbs 2.729 oz / 84.9 kg      Temperature 97.3 F / 36.28 C - Temporal      Pulse 87      Respirations 16      Blood Pressure 144/85 Sitting, Left Arm      Pulse Oximetry 98%, ROOM AIR            Pain Score      Experiencing any pain?:  Yes (SORE ON LEFT SIDE BREAST SINCE SURGERY)      Pain Scale Used:  Numerical      Pain Intensity:  5            Fatigue Score      Experiencing any fatigue?:  Yes      Fatigue (0-10 scale):  10            EXAM      General Appearance:  Positive for: Alert, Oriented x3      Eye:  Positive for: Anicteric Sclerae, PERRLA      HEENT:  Negative for: Scleral Icterus, Thrush      Neck:  Positive for: Supple      Respiratory:  Negative for: Rales, Rhochi      Abdomen/Gastro:  Positive for: Soft;          Negative for: Hepatosplenomegaly      Cardiovascular:  Positive for: RRR;          Negative for: Murmur      Psychiatric:  Positive for: AAO X 3      Lower Extremities:  Negative for: Edema            PREVENTION      Hx Influenza Vaccination:  Yes      Date Influenza Vaccine Given:  Oct 1, 2019      Influenza Vaccine Declined:  No      2 or More Falls Past Year?:  No      Fall Past Year with Injury?:  No      Hx Pneumococcal Vaccination:  No      Encouraged to follow-up with:  PCP regarding preventative exams.      Chart initiated by      RACHELLE LANDEROS MA            ALLERGY/MEDS      Allergies      Coded Allergies:             CILOSTAZOL (Verified  Allergy, Unknown, full body rash and itching,     4/22/20)           SULFA (SULFONAMIDE ANTIBIOTICS) (Verified  Allergy, Unknown, rash, 4/22/20)            Medications            HYDROcodone-Acetaminophen 5-325 Mg (HYDROcodone-Acetaminophen 5-325 Mg) 1 Each     Tablet      2 TAB PO Q4H PRN for PAIN, #30 TAB          Prov: ARLETTE DEAN MD         4/7/20       HYDROcodone-Acetaminophen 5-325 Mg (Norco 5-325 Mg) 1 Each Tablet      2 TAB PO Q4H PRN for pain, #30 TAB 0 Refills         Prov: ARLETTE DEAN MD         3/27/20       Atorvastatin Calcium (Lipitor*) 10 Mg Tablet      10 MG PO HS, #30 TAB 0 Refills         Reported         3/26/20       Aspirin Chew (Aspirin Baby) 81 Mg Tab.chew      81 MG PO QDAY, #30 TAB.CHEW 0 Refills         Reported         3/26/20       Cholecalciferol (Vitamin D3) (Vitamin D3) 1,000 Unit Tablet      1000 UNITS PO QDAY for 30 Days, #30 TAB         Reported         3/26/20       Biotin (Biotin) 5 Mg Tab      5 MG PO QDAY, #30 TAB         Reported         3/26/20       Magnesium Oxide (Magnesium Oxide) 500 Mg Capsule      500 MG PO QDAY, CAP         Reported         3/26/20       (Hair Skin Nails)   No Conflict Check               Reported         3/26/20       Metoprolol Tartrate (Metoprolol Tartrate) 25 Mg Tablet      25 MG PO BID, #60 TAB 0 Refills         Reported         5/31/18       Meloxicam (Meloxicam*) 7.5 Mg Tablet      7.5 MG PO HS, #30 TAB 0 Refills         Reported         5/31/18       Insulin Asp/Prt/Insulin Aspart (novoLOG Mix 70/30 FLEXPEN) 100 Unit/1 Ml     Insuln.pen      50 UNITS SUBQ BID INSULIN, #1 BOX 0 Refills         Reported         5/31/18       Cyanocobalamin (Vitamin B-12*) 500 Mcg Tab      500 MCG PO QDAY, #30 TAB         Reported         3/23/18       Omega-3 Fatty Acids/Fish Oil (Fish Oil 1000 Mg) 1 Each Capsule      1 GM PO QDAY, #30 CAP 0 Refills         Reported         3/23/18       metFORMIN HCl (Glucophage) 500 Mg Tablet      500 MG PO BID, #60 TAB 0 Refills         Reported         3/23/18       Docusate Sodium (Colace) 100 Mg Capsule      100 MG PO QDAY, #30 CAP 0 Refills         Reported         3/23/18       Albuterol Sulfate (Albuterol Sulfate) 1.25 Mg/3 Ml Vial.neb      1.25 MG INH Q6H PRN for SHORTNESS OF BREATH, #120 NEB 0 Refills          Reported         3/23/18       MDI-Albuterol (Proair HFA) 8.5 Gm Hfa.aer.ad      2 PUFFS INH Q4H PRN for SHORTNESS OF BREATH, #1 MDI 0 Refills         Reported         3/23/18       Pentoxifylline (Pentoxifylline) 400 Mg Tablet.er      400 MG PO BID, TAB.SR         Reported         3/23/18      Medications Reviewed:  No Changes made to meds            IMPRESSION/PLAN      Impression      1) Breast Cancer: Ms. Edmondson is a 69-year-old female with a new diagnosis of     breast cancer.  I reviewed with her today Oncotype DX testing.  I reviewed with     her the benefit of adjuvant chemotherapy, and the standard recommendation per     NCCN guidelines to proceed with adjuvant chemotherapy.  We also discussed the     risks of chemotherapy and potential side effects.  Given her age and     comorbidities, she would be high risk for complications.  She reports that her     preference at this time is to forego chemotherapy and to proceed with adjuvant     radiation therapy and hormone therapy.  She understands the risks of a higher     chance of recurrent cancer which would be metastatic and incurable.  We then     discussed adjuvant aromatase number therapy.  I discussed potential side effects    including but not limited to arthralgias, hot flashes, worsening bone density,     and asthenia.  She indicates understanding and agrees to proceed.  She will     follow-up with radiation oncology as well.            2) HTN: She will follow with primary care for this.            3) DM: This is clinically stable.  We will follow this.            Diagnosis      Breast cancer, left         Malignant neoplasm of upper-outer quadrant of left breast in female, estrogen       receptor positive - C50.412, Z17.0         Breast location: upper outer quadrant of breast         Estrogen receptor status: positive         Patient sex: female            Hypertension         Essential hypertension - I10         Hypertension type: essential  hypertension            Diabetes         Type 1 diabetes mellitus without complication - E10.9         Diabetes mellitus type: type 1         Diabetes mellitus complication status: without complication            High risk medication use - Z79.899            Notes      New Medications      * Letrozole (Femara*) 2.5 MG TABLET: 2.5 MG PO QDAY #30      New Diagnostics      * Vitamin D Level, Routine         Dx: High risk medication use - Z79.899            Plan      1) Patient decision to forgo chemotherapy            2) Prescription for Femara given            3) f/u with Radiation Oncology            4) Increase Vitamin D to 2000 IU            5) RTC 1 month to assess tolerance            The total time the visit was 45 minutes.  Over 50% that time was spent in     face-to-face counseling reviewing the diagnosis, prognosis, risk and benefits     associated with chemotherapy, Oncotype DX results, aromatase inhibitor     treatment, and coordination of care.            Patient Education      Patient Education Provided:  Yes            Electronically signed by CHRIS SIMENTAL  04/22/2020 14:02       Disclaimer: Converted document may not contain table formatting or lab diagrams. Please see [employer] [facility] for the authenticated document.

## 2021-05-28 NOTE — PROGRESS NOTES
Patient: GINI EDMONDSON     Acct: [id number]     Report: #[id number]  UNIT #: [id number]     : 1950    Encounter Date:10/21/2020  PRIMARY CARE: JEREMIAH DEVLIN  ***Signed***  --------------------------------------------------------------------------------------------------------------------  Chief Complaint      BREAST CANCER            Referring Provider/Copies To      Referring Provider:  MICHELLE MERINO      Primary Care Provider:  JEREMIAH DEVLIN      Copies To:   ARLETTE DEAN MD; S[facility],[facility]                                     Radiation Oncology                                     [address] [place], [place] [address]                                        [phone]            ,            Dear Dr. Jeremiah Devlin,            Gini Edmondson was seen in our office on    10/21/20. The purpose of her     visit was to evaluate for wellness and any ongoing side effects related to her     cancer diagnosis and treatment as well as discuss the plan for recommended     follow up care.            The attached report is a summary of the treatment received by Ms. Edmondson to     date.   You will find the cancer SURVIVORSHIP TREATMENT SUMMARY AND CARE Plan     attached to this communication and it may also be found as part of the     electronic medical records in [facility].   This is in compliance with standards of    quality cancer care outlined by the Commission on Cancer ([place]), the American     Society of Clinical Oncology (ASCO), and the National Comprehensive Cancer     Network (NCCN).               Today, I discussed the treatment summary and plan of care in detail with     Gini Edmondson  and have provided a written copy of the plan.   She  was     advised to continue with regular follow-up care with Jeremiah Devlin treating PCP    in accordance with the national guidelines  while transitioning back to their PCP    for general healthcare needs.   If you have any questions regarding your     patient's survivorship care please feel free to contact me.   Thank you for     allowing me to participate in the care of your patient.               Warm Regards,            ANASTASIA Sigala, APRN, OCN      Oncology Survivorship Nurse Practitioner      O: [phone]      F: [phone]            Allergies      Coded Allergies:             CILOSTAZOL (Verified  Allergy, Unknown, full body rash and itching,     5/22/20)           SULFA (SULFONAMIDE ANTIBIOTICS) (Verified  Allergy, Unknown, rash, 5/22/20)            Medications      Last Reconciled on 10/21/20 14:53 by JACY WILEY [facility]      Letrozole (Femara*) 2.5 Mg Tablet      2.5 MG PO QDAY, #90 TAB 3 Refills         Prov: CHRIS SIMENTAL         6/16/20       Atorvastatin Calcium (Lipitor*) 10 Mg Tablet      10 MG PO HS, #30 TAB 0 Refills         Reported         3/26/20       Aspirin Chew (Aspirin Baby) 81 Mg Tab.chew      81 MG PO QDAY, #30 TAB.CHEW 0 Refills         Reported         3/26/20       Cholecalciferol (Vitamin D3) (Vitamin D3) 1,000 Unit Tablet      1000 UNITS PO QDAY for 30 Days, #30 TAB         Reported         3/26/20       Biotin (Biotin) 5 Mg Tab      5 MG PO QDAY, #30 TAB         Reported         3/26/20       Magnesium Oxide (Magnesium Oxide) 500 Mg Capsule      500 MG PO QDAY, CAP         Reported         3/26/20       (Hair Skin Nails)   No Conflict Check               Reported         3/26/20       Metoprolol Tartrate (Metoprolol Tartrate) 25 Mg Tablet      25 MG PO BID, #60 TAB 0 Refills         Reported         5/31/18       Meloxicam (Meloxicam*) 7.5 Mg Tablet      7.5 MG PO HS, #30 TAB 0 Refills         Reported         5/31/18       Insulin Asp/Prt/Insulin Aspart (novoLOG Mix 70/30 FLEXPEN) 100 Unit/1 Ml     Insuln.pen      50 UNITS SUBQ BID INSULIN, #1 BOX 0 Refills         Reported         5/31/18        Cyanocobalamin (Vitamin B-12*) 500 Mcg Tab      500 MCG PO QDAY, #30 TAB         Reported         3/23/18       Omega-3 Fatty Acids/Fish Oil (Fish Oil 1000 Mg) 1 Each Capsule      1 GM PO QDAY, #30 CAP 0 Refills         Reported         3/23/18       metFORMIN HCl (Glucophage) 500 Mg Tablet      500 MG PO BID, #60 TAB 0 Refills         Reported         3/23/18       Docusate Sodium (Colace) 100 Mg Capsule      100 MG PO QDAY, #30 CAP 0 Refills         Reported         3/23/18       Albuterol Sulfate (Albuterol Sulfate) 1.25 Mg/3 Ml Vial.neb      1.25 MG INH Q6H PRN for SHORTNESS OF BREATH, #120 NEB 0 Refills         Reported         3/23/18       MDI-Albuterol (Proair HFA) 8.5 Gm Hfa.aer.ad      2 PUFFS INH Q4H PRN for SHORTNESS OF BREATH, #1 MDI 0 Refills         Reported         3/23/18       Pentoxifylline (Pentoxifylline) 400 Mg Tablet.er      400 MG PO BID, TAB.SR         Reported         3/23/18            PAST, FAMILY   Past Medical History      COPD, Depression, Diabetes Type 2, Heart Attack, Hypertension      Hematology/oncology:  REPORTS HX OF: Breast cancer (left breast invasive lobular    carcinoma)            Past Surgical History      REPORTS HX OF: Cholecystectomy, Lumpectomy (left breast), Other Past Surgical Hx    (triple bipass March 2018)            Family History      REPORTS HX OF: Breast cancer (2 Aunts), Prostate cancer (Father), Other Cancer     History (Pancreatic- Brother)            Social History      Lives independently:  Yes      Occupation:  retired            Tobacco Use      Tobacco status:  Current every day smoker (1 pack every 3 days)      Smoking packs/day:  0.5            Alcohol Use      Alcohol intake:  None            Substance Use      Substance use:  Denies use            HISTORY OF PRESENT ILLNESS      Interim History      Treatment History Summary      Ms. Gini Edmondson is a pleasant 70-year-old white female with left hormone     receptor positive, HER-2/ted  negative, KI 6730% invasive lobular carcinoma. The     following summarizes her treatment journey.             On November 15, 2019 Ms. Edmondson underwent a screening mammography.  Findings     were positive for an ill-defined nodular asymmetry in the upper outer quadrant     of the left breast.            On January 29, 2020, Ms. Edmondson underwent a diagnostic mammogram which showed a    suspicious mass in the upper outer quadrant of the left breast.  An ultrasound     was performed correlating with the mammographic abnormality at the 7 o'clock     position of the left breast approximately 7 cm from the nipple, there was an     ill-defined 1.1 cm heterogenous mass with dense posterior shadowing.            On February 4, 2020, Ms. Edmondson underwent a left breast ultrasound guided core     biopsy.  Pathology, S-) indicated invasive lobular carcinoma, grade 2, 9     mm, estrogen receptor positive (100%), progesterone receptor positive (10%), Ki-    67 (30%), HER-2/ted negative.            On February 13, 2020 she was evaluated by Dr. Pankaj Ornelas medical     oncologist.            On February 19, 2020, Ms. Edmondson underwent a breast MRI.  The right breast was     unremarkable.  The left breast revealed a 2 x 2.5 cm mass in the upper outer     left breast at the proven invasive lobular carcinoma biopsy site.  Also noted     was a left 0.6 cm oval enhancing lesion just below the nipple line.            On March 5, 2020, Ms. Edmondson underwent a left breast MRI guided core needle     biopsy of a 0.6 cm oval enhancing lesion.  Pathology (S-) revealed benign    tissue with a cluster of a aprocrine cyst and usual ductal hyperplasia and     columnar cell change.            On March 27, 2020, Ms. Edmondson underwent a left lumpectomy with a sentinel lymph    node biopsy.  Pathology (S-) indicated a invasive lobular carcinoma, 13     mm, grade 2.  To positive margins anterior medial margin as well as the  superior    medial margin.  2 of 2 lymph nodes were negative ancillary studies or the same     as the biopsy.  Pathological staging pT1c pN0.             On April 8, 2020, Ms. Edmondson underwent reexcision of her margins of the left     breast.  Invasion of 1 mm at the 10 o'clock position of the left breast was note    d overall grade was 1 and it was a single focus.            On April 20, 2020, an Oncotype DX was obtained with a score of 30.            On April 22, 2020, Ms. Edmondson met with Dr. Ornelas and at that time a     decision was made to forego chemotherapy and plan for adjuvant radiation and     aromatase inhibitor.  She was started on Femara on April 22, 2020.            On April 23, 2020, Ms. Edmondson was evaluated by Dr. Tim Reynolds (radiation     oncology).            From May 14, 2020 - June 5, 2020, Mr. Edmondson received 16/16 fractions of     external beam radiation.  Total dose of 4.256 cGy.  She experienced grade 1 skin    reaction with mild erythema and some left axillary hyperpigmentation.              On August 10, 2020, Ms. Edmondson underwent a bone densitometry which revealed     normal lumbar spine and femur.            REVIEW OF SYSTEMS      General:  Complains of: Appetite change (increased), Fatigue (reports fatigue     has increased, 10 today), Night sweats, Weight gain (gain 30 pounds since     surgery in 2018)      Eyes:  Complains of: Corrective lenses; Denies: Blurred vision      Ears, nose, mouth, throat:  Denies: Headache, Visual Changes, Hearing loss      Cardiovascular:  Denies: Chest pain, Irregular heartbeat, Palpitations      Respiratory:  Complains of: Shortness of Air (COPD), Productive cough (white     thick secretions)      Gastrointestinal:  Complains of: Problem swallowing (comes and goes),     Constipation (chronic); Denies: Nausea      Kidney/Bladder:  Complains of: Frequent Urination; Denies: Painful Urination,     Change in urinary stream, Blood in urine,  Incontinence      Musculoskeletal:  Denies: Painful Joints, Muscle Pain      Skin:  DENIES: Jaundice, Easy Bleeding, Nail changes      Neurological:  Complains of: Dizziness (at times, comes and goes),     Numbness\Tingling (feet tingle)      Psychiatric:  Complains of: AAO X 3, Depression (adressed with primary care     yesterday per patient)      Endocrine:  Denies Diabetes (type II)Osteoporosis      Hematologic/lymphatic:  Denies: Bruising, Bleeding      Reproductive:  Complains of Menopause            VITAL SIGNS AND SCORES      Vitals      Temperature 97.6 F / 36.44 C      Pulse 71      Respirations 18      Blood Pressure 123/61 Standing, Left Arm      Pulse Oximetry 96%            Pain Score      Experiencing any pain?:  Yes      Pain Scale Used:  Numerical      Pain Intensity:  2      Pain Comment:        left breast has sharp pains that come and go, also has developed a deep      itch.            Fatigue Score      Experiencing any fatigue?:  Yes      Fatigue (0-10 scale):  10            PREVENTION      Hx Influenza Vaccination:  Yes      Date Influenza Vaccine Given:  Sep 1, 2020      Influenza Vaccine Declined:  No      Hx Pneumococcal Vaccination:  Yes (10/20/2020 at PCP)      Encouraged to follow-up with:  PCP regarding preventative exams.      Chart initiated by      CAROLYN Denney RN            IMPRESSION/PLAN      Impression      1.  Survivorship appointment      2.  Ms. Gini Edmondson is a pleasant 70-year-old white female with invasive     lobular carcinoma, estrogen receptor positive, progesterone receptor positive,     HER-2/ted negative with Ki-67 of 30% involving the left breast.  She is status     post lumpectomy and sentinel lymph node biopsy.  Her Oncotype DX score was 30.      She opted out of chemotherapy.  She is now status post external beam     radiotherapy and is on an aromatase inhibitor.            Plan - Survivorship Discussion      Survivorship Discussion      SURVEILLANCE:       -Survivorship care plan and treatment history was given to the patient and     reviewed.  Copy will be sent to each provider.              -Will continue active surveillance with Dr. Chavez.  Will resume annual     mammography, xext annual due in November 2020.  Additional imaging will be based    on clinical findings and symptomatic complaints.            -She will be seen by Dr. Chavez every 3-6-12 months for a total of five years     for history and physical, as well assessing for compliance of endocrine therapy     and assessing for toxicities.             -Reiterated side effects of Letrozole which include but are not limited to     vasomotor symptoms (hot flashes and night sweats), dyspareunia and loss of     libido, vaginal dryness and discharge (lesser extent versus tamoxifen),     osteoporosis, fracture and arthralgias. Less common side effects include risk of    stroke, MI and cataract formation and changes in lipid metabolism.             MONITORING FOR LATE EFFECTS:      -Discussed bone health.  Anastrozole may decrease bone density and strength by     blocking estrogen production in the adrenal gland.  Her last bone density was     August /2020 with normal bone density in lumbar spine and hip.  Recommended 500     mg twice a day of calcium and 2,000 IU - 5,000 IU of Vitamin D2 daily.             -Discussed cardiac screening.  Explained to her women continue to die from heart    disease moreso than cancer.  Has multiple risk factors for cardiovascular     disease, including her heavy smoking history, hypertension, hyperlipidemia,     diabetes and age.  She is treated for hypertension and hyperlipidemia.  History     of triple bypass surgery.  Smokes one pack of cigarettes per day.  Recommended     continuation of blood pressure monitoring as well as monitor of her of lipid     profile.  Recommended discussion of Calcium Coronary Scanning, she may need to     travel for this and may need to pay for out  "of pocket.  It is done at [facility]     (approximately $99.00). Recommended a plant based diet with fruits and     vegetables, whole grains and legumes rich in fiber (25 grams daily) and     nutrients.  Limit refined grains and sugary foods, proteins high in saturated     fats and avoidance of processed meats. Encouraged fats rich in Omega 3s and     limiting Omega 6s. . Per recommendations (Current Breast Cancer Reports 2016,     8(3) p 139-150) 150 minutes per week of moderate of 75 minutes per week of     vigorous aerobic exercise.  Muscle strengthening activities or resistance     exercise for all major muscle groups 2 days per week. Weight gain is seen in use    with Aromatase Inhibitors approximately 5 pounds. Discussed importance of     exercise and diet.             -She received radiation and is at an increased risk of rib cage fracture on side    treated. The risk is 5-10% additionally from her current risk.  This usually     occurs at the 5 year mariella.  She had very limited exposure to lung and does not     notice a difference in breathing, nor does she have a dry cough.  Did explain     may see a scar on lung from CT imaging. Limited exposure with heart due to the     utilization of deep inspiratory breath - hold (DIBH) for left sided breast     cancers in order to facilitate cardiac sparing.               -Denies lymphedema.  Discussed this could occur later in life.  Lina Padilla's     (Lymph Edema Therapist) phone number and information is included in her packet.             -Discussed sexual health, may use Replens or Astroglyde as needed.              -Denies fatigue.  Sleeps \"to much\".  Has CAD and COPD which both play a role in     her fatigue.  She is starting cardiac rehab and will do so for three months.             Mobility:  Denies mobility problems or falls.  Discussed safety proofing her     home, good lighting, remove loose rugs, hand rails on steps and tub.             -Cognitive function: Does " have problems with word finding. Discussed lumosity on    line.  Discussed smoking causes small vessel changes in brain as well.              -Pain: Denies            RISK REDUCTION:      -Resume annual mammogram screening due November 2020.             -Her biggest risk at this point is her smoking history.  Not only does this     increase her risk of lung cancer, but other cancers as well including     pancreatic, gallbladder, gastric, head and neck, kidney and bladder as well as     coronary artery disease and stroke. She smokes one to two packs per day. Has     used multiple modalities in past with several attempts to stop and has been     unsuccessful.  Discussed use of nicotine patches for maintenance nicotine and     lozenges for breakthrough. Discussed triggers such as coffee in the morning and     riding in the car.  Discussed interventions to decrease habits.  She is eligible    to participate in Low Dose Lung Cancer Screening yearly and for 15 years once     she stops completely.              -Recommended every 3-5 year evaluation by GYN or PCP pap screening. Unsure of     her last vaginal exam.               -Colonoscopy due every 10 years, last done in 2013.             -Discussed screenings for Hepatitis B and C. Recommended discussion with PCP.            -Discussed vaccinations.  Needs updating on her Tdap. Has had chicken pox and     needs Herpes Zoster vaccine. Thinks she has had prevnar and pneumovax.  Receives    influenza vaccine yearly.               -Will need repeat bone density in 2022.             PSYCHOSOCIAL:      -Discussed Distress thermometer (score 8).  Stressors identified revolved around    dealing with partner, family health issues, depression, worry, loss of interest     in activities.  Reinforced she is now in active surveillance and will be     monitored for reoccurrence of her breast cancer.  She has discussed her     depression with her PCP and was on antidepressant until  October 2019 when she     discontinued.  Admits she felt better on an antidepressant.  Encouraged to call     if any questions or concerns arise.  Resource booklet in packet includes names     and phone number of counselors if needed.  Encouraged her to call Porsche Schwarz LCSW also with any concerns.   Her phone number and contact information     was provided.             -She denies any issues with body image.              -Anxiety screen (PATY - 7 - score of 9). Recommended VISHAL Damian     [phone]. Written and given to patient.             -Depression screen (PHQ - score of 15).  Strongly encouraged her to discuss with    PCP or Salina Tamayo.  No SI or Hi.              -Social support discussed as above.  Informational care for [employer]     [employer] given to patient.             -Does have a living will. Packet from [employer] [facility] given to     patient.  Also can be located at [facility].            Mgmt of Disease and Treatment      Referrals:  Nutritionist (recommended for weight gain), Lymphedema Referral (has    seen in past, number givne. ), Physical Therapy Referral (is going to do cardiac    rehab.)      Recommend:  Weight Bearing Exercises, Proof home, Removal of  loose rugs, Good     lighting, Handle bars in tub            Patient Education      Tobacco Cessation Counseling:  for 3 - 10 minutes            Advanced Care Plan Discussion      Time Spent      13:00 - 14:05      Care Plan Given:  Yes            Electronically signed by MICHELLE MERINO onc  10/21/2020 14:54       Disclaimer: Converted document may not contain table formatting or lab diagrams. Please see [employer] for the authenticated document.

## 2021-06-24 ENCOUNTER — OFFICE VISIT (OUTPATIENT)
Dept: VASCULAR SURGERY | Facility: HOSPITAL | Age: 71
End: 2021-06-24

## 2021-06-24 VITALS
BODY MASS INDEX: 41.66 KG/M2 | HEIGHT: 55 IN | WEIGHT: 180 LBS | DIASTOLIC BLOOD PRESSURE: 70 MMHG | RESPIRATION RATE: 18 BRPM | TEMPERATURE: 98.2 F | HEART RATE: 82 BPM | SYSTOLIC BLOOD PRESSURE: 112 MMHG

## 2021-06-24 DIAGNOSIS — I73.9 PAD (PERIPHERAL ARTERY DISEASE) (HCC): Primary | ICD-10-CM

## 2021-06-24 PROBLEM — E11.9 DIABETES MELLITUS, TYPE II: Status: ACTIVE | Noted: 2021-06-24

## 2021-06-24 PROBLEM — I73.00 RAYNAUD'S SYNDROME: Status: ACTIVE | Noted: 2021-06-24

## 2021-06-24 PROBLEM — E04.9 GOITER: Status: ACTIVE | Noted: 2021-06-24

## 2021-06-24 PROBLEM — J45.909 ASTHMA: Status: ACTIVE | Noted: 2021-06-24

## 2021-06-24 PROBLEM — F32.A DEPRESSION: Status: ACTIVE | Noted: 2021-06-24

## 2021-06-24 PROBLEM — J44.9 CHRONIC OBSTRUCTIVE PULMONARY DISEASE: Status: ACTIVE | Noted: 2021-06-24

## 2021-06-24 PROBLEM — I10 HYPERTENSION, ESSENTIAL: Status: ACTIVE | Noted: 2021-06-24

## 2021-06-24 PROCEDURE — 99213 OFFICE O/P EST LOW 20 MIN: CPT | Performed by: NURSE PRACTITIONER

## 2021-06-24 PROCEDURE — G0463 HOSPITAL OUTPT CLINIC VISIT: HCPCS | Performed by: NURSE PRACTITIONER

## 2021-06-24 RX ORDER — PEN NEEDLE, DIABETIC 31 GX5/16"
1 NEEDLE, DISPOSABLE MISCELLANEOUS 2 TIMES DAILY
COMMUNITY
Start: 2021-06-01

## 2021-06-24 RX ORDER — INSULIN ASPART 100 [IU]/ML
50 INJECTION, SOLUTION INTRAVENOUS; SUBCUTANEOUS 2 TIMES DAILY
COMMUNITY
End: 2021-06-24

## 2021-06-24 RX ORDER — CHLORAL HYDRATE 500 MG
1 CAPSULE ORAL DAILY
COMMUNITY

## 2021-06-24 RX ORDER — DOCUSATE SODIUM 100 MG/1
1 CAPSULE, LIQUID FILLED ORAL TAKE AS DIRECTED
COMMUNITY

## 2021-06-24 RX ORDER — FOLIC ACID 0.8 MG
1 TABLET ORAL AS NEEDED
COMMUNITY

## 2021-06-24 RX ORDER — ATORVASTATIN CALCIUM 20 MG/1
1 TABLET, FILM COATED ORAL DAILY
COMMUNITY

## 2021-06-24 RX ORDER — LETROZOLE 2.5 MG/1
2.5 TABLET, FILM COATED ORAL DAILY
COMMUNITY
Start: 2021-04-07 | End: 2021-07-01 | Stop reason: SDUPTHER

## 2021-06-24 RX ORDER — SOY PROTEIN
1 POWDER (GRAM) ORAL DAILY
COMMUNITY
End: 2021-07-07

## 2021-06-24 RX ORDER — BLOOD SUGAR DIAGNOSTIC
1 STRIP MISCELLANEOUS 3 TIMES DAILY
COMMUNITY
Start: 2021-06-01

## 2021-06-24 RX ORDER — LANCETS
1 EACH MISCELLANEOUS 2 TIMES DAILY
COMMUNITY
Start: 2021-06-01

## 2021-06-24 RX ORDER — ALBUTEROL SULFATE 90 UG/1
1 AEROSOL, METERED RESPIRATORY (INHALATION) 2 TIMES DAILY
COMMUNITY

## 2021-06-24 RX ORDER — ESCITALOPRAM OXALATE 10 MG/1
10 TABLET ORAL DAILY
COMMUNITY
Start: 2021-05-18

## 2021-06-24 RX ORDER — SENNA PLUS 8.6 MG/1
1 TABLET ORAL DAILY
COMMUNITY

## 2021-06-24 RX ORDER — BIOTIN 1 MG
1 TABLET ORAL DAILY
COMMUNITY

## 2021-06-24 RX ORDER — NITROGLYCERIN 0.4 MG/1
1 TABLET SUBLINGUAL DAILY
COMMUNITY
Start: 2021-06-13 | End: 2022-09-15 | Stop reason: HOSPADM

## 2021-06-24 RX ORDER — PENTOXIFYLLINE 400 MG/1
1 TABLET, EXTENDED RELEASE ORAL 2 TIMES DAILY
COMMUNITY

## 2021-06-24 NOTE — PROGRESS NOTES
[facility] Vascular Surgery Office Follow Up Note     Date of Encounter: 06/24/2021     MRN Number: [id number]  Name: Gini Edmondson  Phone Number: [phone]     Referred By: Jose Armando Mcbride MD  PCP: Cheyenne Russell APRN    Chief Complaint:    Chief Complaint   Patient presents with   • Leg Pain     PAD       Subjective      History of Present Illness:  Gini Edmondson is a pleasant  70 y.o. female returns for follow-up visit for claudication symptoms of the bilateral lower extremities.  He was previously seen on 10/6/2020 by Dr. Mcbride who ordered physical therapy through the cardiovascular claudication rehabilitation program.  Ms. Edmondson attended rehabilitation and felt that it was successful but was only allowed a certain amount of visits due to insurance.  She reports that she likes to walk to a store just across the street from her home but the calf pain causes her to have to stop and rest multiple times.  Ms. Edmondson would like to try all measures before any surgical interventions.  She continues to smoke around 1 pack/week.  She has an extensive history with multiple medical comorbidities including diabetes myelitis and hyperlipidemia.  She does currently take aspirin and Lipitor.    Review of Systems:  ROS    I have reviewed the following portions of the patient's history: allergies, current medications, past family history, past medical history, past social history, past surgical history and problem list and confirm it's accurate.    Allergies:  Allergies   Allergen Reactions   • Sulfa Antibiotics Hives       Medications:    No current outpatient medications on file.    History:   Past Medical History:   Diagnosis Date   • Allergy to sunlight     PER PT   • Cancer of left breast (CMS/HCC)     INVASVIE LOBULAR CARCINOMA   • Cardiac disorder     DR. MALCOLM ANTHONY   • Condition not found     RIGHT EYE CATARACT (LT CATARACT REPAIRED)   • Constipation    • Heart attack (CMS/HCC)   "   3-22-18 NON STEMI, CABG 3-23-18   • Hx of bronchitis     SMOKES 5-7 CIGERETTS PER DAY   • Hx of CABG     3V CABG 2018   • Hypertension     MEDS CONTROL   • Neuropathy     IN FEETH FROM DM   • Raynaud's syndrome    • Status post left breast lumpectomy        Past Surgical History:   Procedure Laterality Date   • BREAST LUMPECTOMY      LEFT   • COLONOSCOPY     • LAPAROSCOPIC TUBAL LIGATION     • OTHER SURGICAL HISTORY      BILAT PAD LEGS   • OTHER SURGICAL HISTORY      CHIP IN LEFT BREAST, MARKER FOR SPOT   • OTHER SURGICAL HISTORY      LEFT KNEE REPAIR       Social History     Socioeconomic History   • Marital status:      Spouse name: Not on file   • Number of children: Not on file   • Years of education: Not on file   • Highest education level: Not on file   Tobacco Use   • Smoking status: Current Every Day Smoker   • Tobacco comment: 5-7 CIGARETTES   Vaping Use   • Vaping Use: Unknown        Family History   Problem Relation Age of Onset   • Prostate cancer Father    • Pancreatic cancer Brother    • Breast cancer Maternal Aunt    • Breast cancer Maternal Aunt        Objective     Physical Exam:  Vitals:    06/24/21 1348   BP: 112/70   BP Location: Left arm   Patient Position: Sitting   Pulse: 82   Resp: 18   Temp: 98.2 °F (36.8 °C)   TempSrc: Tympanic   Weight: 81.6 kg (180 lb)   Height: 62 cm (24.41\")      Body mass index is 212.41 kg/m².    Physical Exam  HENT:      Head: Atraumatic.   Musculoskeletal:         General: No swelling.      Cervical back: Neck supple.   Skin:     General: Skin is dry.      Findings: No erythema or rash.   Neurological:      Mental Status: She is alert and oriented to person, place, and time.     Lower extremities: Cool to touch, nonpalpable DP pulse.  No ulcers.      Imaging/Labs:    No radiology results for the last 30 days.       Assessment / Plan      Assessment / Plan:  Diagnoses and all orders for this visit:    1. PAD (peripheral artery disease) (CMS/[facility]) " (Primary)    Mrs. Edmondson has moderate to severe peripheral artery disease.  I have reviewed Dr. Mcbride's notes, previous test and discussed this with Ms. Edmondson.  After a long discussion we both agree that she would like to try physical therapy for claudication rehabilitation for her PAD before pursuing any surgical interventions.  I have reinforced the importance of smoking cessation and its implications.  She explains that she went from 1 pack every 2 days to 1 pack per week.  I will order the supervised exercise treatment through the cardiopulmonary rehab and she will follow up with Dr. Mcbride in 3 months to reevaluate.  I also advised that she may call the office sooner for an appointment if her symptoms should become worse.    Thank you for allowing me to participate in your patient's care.    Follow Up:   Return in about 3 months (around 9/24/2021) for Recheck.   Or sooner for any further concerns or worsening sign and symptoms. If unable to reach us in the office please dial 911 or go to the nearest emergency department.      Mihir LOUIE  [facility] Vascular Surgery

## 2021-06-25 DIAGNOSIS — I73.9 PAD (PERIPHERAL ARTERY DISEASE) (HCC): Primary | ICD-10-CM

## 2021-06-28 PROBLEM — C50.912 MALIGNANT NEOPLASM OF LEFT BREAST IN FEMALE, ESTROGEN RECEPTOR POSITIVE: Status: ACTIVE | Noted: 2020-03-27

## 2021-06-28 PROBLEM — Z17.0 MALIGNANT NEOPLASM OF LEFT BREAST IN FEMALE, ESTROGEN RECEPTOR POSITIVE: Status: ACTIVE | Noted: 2020-03-27

## 2021-07-01 ENCOUNTER — APPOINTMENT (OUTPATIENT)
Dept: RADIATION ONCOLOGY | Facility: HOSPITAL | Age: 71
End: 2021-07-01

## 2021-07-03 RX ORDER — LETROZOLE 2.5 MG/1
2.5 TABLET, FILM COATED ORAL DAILY
Qty: 90 TABLET | Refills: 1 | Status: SHIPPED | OUTPATIENT
Start: 2021-07-03 | End: 2022-01-10

## 2021-07-07 ENCOUNTER — OFFICE VISIT (OUTPATIENT)
Dept: RADIATION ONCOLOGY | Facility: HOSPITAL | Age: 71
End: 2021-07-07

## 2021-07-07 VITALS
HEART RATE: 94 BPM | SYSTOLIC BLOOD PRESSURE: 144 MMHG | HEIGHT: 62 IN | TEMPERATURE: 97.3 F | DIASTOLIC BLOOD PRESSURE: 69 MMHG | WEIGHT: 178.79 LBS | RESPIRATION RATE: 16 BRPM | BODY MASS INDEX: 32.9 KG/M2 | OXYGEN SATURATION: 94 %

## 2021-07-07 DIAGNOSIS — C50.512 MALIGNANT NEOPLASM OF LOWER-OUTER QUADRANT OF LEFT BREAST OF FEMALE, ESTROGEN RECEPTOR POSITIVE (HCC): Primary | ICD-10-CM

## 2021-07-07 DIAGNOSIS — Z17.0 MALIGNANT NEOPLASM OF LOWER-OUTER QUADRANT OF LEFT BREAST OF FEMALE, ESTROGEN RECEPTOR POSITIVE (HCC): Primary | ICD-10-CM

## 2021-07-07 PROBLEM — C50.912 MALIGNANT NEOPLASM OF LEFT BREAST IN FEMALE, ESTROGEN RECEPTOR POSITIVE: Status: RESOLVED | Noted: 2020-03-27 | Resolved: 2021-07-07

## 2021-07-07 PROCEDURE — 99212 OFFICE O/P EST SF 10 MIN: CPT | Performed by: NURSE PRACTITIONER

## 2021-07-07 PROCEDURE — G0463 HOSPITAL OUTPT CLINIC VISIT: HCPCS | Performed by: NURSE PRACTITIONER

## 2021-07-07 NOTE — PROGRESS NOTES
Follow Up Office Visit      Encounter Date: 07/07/2021   Patient Name: Gini Edmondson  YOB: 1950   Medical Record Number: [id number]   Primary Diagnosis: Malignant neoplasm of lower-outer quadrant of left breast of female, estrogen receptor positive (CMS/HCC) [C50.512, Z17.0]   Cancer Staging: Stage 1  No matching staging information was found for the patient.   Completion Date:  6/5/21    Chief Complaint:    Chief Complaint   Patient presents with   • Follow-up   • Breast Cancer       History of Present Illness: Gini Edmondson is a 70 y.o. female who is here today to be seen for follow up of Radiation Therapy. Returns for routine follow up on left ILC Stage 1 hormone receptor breast cancer status post lumpectomy with reexcision, external beam radiotherapy and currently on an AI. Her OncoDX score was (30) and she declined chemotherapy.  Is taking her AI.  Mammogram done on 3/29/21 to left breast with large seroma noted.  U/S doen on 4/9/21 with 9.7 cm mildly complex fluid collection compatible with post surgical seroma.  BDM done on 8/10/20 with normal bone density.  Continues to smoke.      Subjective      Review of Systems: Review of Systems   Constitutional: Positive for fatigue. Negative for appetite change.   Respiratory: Positive for cough and shortness of breath (hx of COPD).    Cardiovascular: Leg swelling: occurs more in the feet.   Gastrointestinal: Positive for constipation (takes daily stool softener). Negative for diarrhea.   Musculoskeletal: Positive for back pain (ongoing).   Neurological: Positive for dizziness (occasionally). Negative for headaches.   Psychiatric/Behavioral: The patient is not nervous/anxious.        The following portions of the patient's history were reviewed and updated as appropriate: allergies, current medications, past family history, past medical history, past social history, past surgical history and problem list.    Medications:     Current  Outpatient Medications:   •  Accu-Chek FastClix Lancets misc, Inject 1 Device under the skin into the appropriate area as directed 2 (Two) Times a Day. as directed, Disp: , Rfl:   •  Accu-Chek Guide test strip, 1 each by Other route 3 (Three) Times a Day. as directed, Disp: , Rfl:   •  albuterol sulfate HFA (ProAir HFA) 108 (90 Base) MCG/ACT inhaler, Inhale 1 puff 2 (Two) Times a Day., Disp: , Rfl:   •  ASPIRIN 81 PO, Take 1 tablet/day by mouth Daily., Disp: , Rfl:   •  atorvastatin (LIPITOR) 20 MG tablet, Take 1 tablet by mouth Daily., Disp: , Rfl:   •  B-D ULTRAFINE III SHORT PEN 31G X 8 MM misc, Inject 1 application under the skin into the appropriate area as directed 2 (Two) Times a Day., Disp: , Rfl:   •  Biotin 1000 MCG tablet, Take 1 tablet by mouth Daily., Disp: , Rfl:   •  docusate sodium (Stool Softener) 100 MG capsule, Take 1 tablet by mouth Take As Directed., Disp: , Rfl:   •  escitalopram (LEXAPRO) 10 MG tablet, Take 10 mg by mouth Daily., Disp: , Rfl:   •  insulin NPH-insulin regular (humuLIN 70/30,novoLIN 70/30) (70-30) 100 UNIT/ML injection, Inject 50 Units under the skin into the appropriate area as directed 2 (Two) Times a Day With Meals., Disp: , Rfl:   •  letrozole (FEMARA) 2.5 MG tablet, Take 1 tablet by mouth Daily., Disp: 90 tablet, Rfl: 1  •  Magnesium 500 MG capsule, Take 1 tablet by mouth As Needed., Disp: , Rfl:   •  metoprolol tartrate (LOPRESSOR) 25 MG tablet, Take 25 mg by mouth 2 (Two) Times a Day., Disp: , Rfl:   •  nitroglycerin (NITROSTAT) 0.4 MG SL tablet, Take 1 tablet by mouth Daily. USE AS DIRECTED, Disp: , Rfl:   •  Omega-3 Fatty Acids (fish oil) 1000 MG capsule capsule, Take 1 tablet by mouth Daily., Disp: , Rfl:   •  pentoxifylline (TRENtal) 400 MG CR tablet, Take 1 tablet by mouth 2 (two) times a day., Disp: , Rfl:   •  senna (SENOKOT) 8.6 MG tablet, Take 1 tablet by mouth Daily., Disp: , Rfl:   •  SITagliptin (Januvia) 100 MG tablet, Take 1 tablet by mouth Daily., Disp: ,  "Rfl:     Allergies:   Allergies   Allergen Reactions   • Sulfa Antibiotics Hives       ECOG (1) Restricted in physically strenuous activity, ambulatory and able to do work of light nature    Objective     Physical Exam:   Vital Signs:   Vitals:    07/07/21 1421   BP: 144/69   Pulse: 94   Resp: 16   Temp: 97.3 °F (36.3 °C)   TempSrc: Temporal   SpO2: 94%   Weight: 81.1 kg (178 lb 12.7 oz)   Height: 157.5 cm (62\")   PainSc: 0-No pain     Body mass index is 32.7 kg/m².     Physical Exam  Vitals and nursing note reviewed.   Constitutional:       General: She is not in acute distress.     Appearance: Normal appearance.   HENT:      Head: Normocephalic and atraumatic.      Mouth/Throat:      Mouth: Mucous membranes are moist.      Pharynx: Oropharynx is clear.   Eyes:      Extraocular Movements: Extraocular movements intact.      Conjunctiva/sclera: Conjunctivae normal.      Pupils: Pupils are equal, round, and reactive to light.   Cardiovascular:      Rate and Rhythm: Normal rate and regular rhythm.      Heart sounds: Normal heart sounds.   Pulmonary:      Effort: Pulmonary effort is normal.      Breath sounds: Normal breath sounds.   Chest:          Comments: Well healed incision to left breast with palpable seroma noted.  No masses or LA noted.   Right breast without skn changes, mass or LA.   No LUE   Musculoskeletal:         General: Normal range of motion.      Cervical back: Normal range of motion and neck supple.   Skin:     General: Skin is warm and dry.   Neurological:      General: No focal deficit present.      Mental Status: She is alert and oriented to person, place, and time. Mental status is at baseline.   Psychiatric:         Mood and Affect: Mood normal.         Radiographs: No radiology results for the last 90 days.   Pathology:   Labs:     Assessment / Plan      Impressions: Gini Edmondson is a pleasant 70 y.o. female with Stage 1, hormone receptor positive left ILC status post lumpectomy with " reexcision for positive margin, OncoType DX (30), declined chemotherapy.  Status post external beam radiotherapy and on an AI.     Assessment/Plan:   Diagnoses and all orders for this visit:    1. Malignant neoplasm of lower-outer quadrant of left breast of female, estrogen receptor positive (CMS/HCC) (Primary)           Follow Up:   1. Discussed importance of active surveillance.  Will not schedule follow up with rad onc since she is following with med onc and they are performing her H/P and prescribing her AI.  She voiced understanding and knows will need to keep follow up with Dr. Chavez.   No follow-ups on file.      Sincerely,        JACY Norman  [facility] Radiation Oncology   This document has been signed by JACY Sigala on July 7, 2021 15:13 EDT

## 2021-07-28 ENCOUNTER — TELEPHONE (OUTPATIENT)
Dept: ONCOLOGY | Facility: HOSPITAL | Age: 71
End: 2021-07-28

## 2021-07-28 NOTE — TELEPHONE ENCOUNTER
Caller: PT    Relationship to patient: SELF    Best call back number: [phone] OK TO LEAVE  MSG BUT PREFERS TO SPEAK TO SOMEONE    Chief complaint: APPT     Type of visit: F/U    Requested date: ANY IN THE AFTERNOON     If rescheduling, when is the original appointment: 6/28    Additional notes: PT PREFERS AFTERNOON APPT    PT HAS CALLED [facility] TO GET APPT SCHEDULED. PLZ CALL ASAP

## 2021-08-10 DIAGNOSIS — I73.9 PAD (PERIPHERAL ARTERY DISEASE) (HCC): Primary | ICD-10-CM

## 2021-09-14 ENCOUNTER — OFFICE VISIT (OUTPATIENT)
Dept: ONCOLOGY | Facility: HOSPITAL | Age: 71
End: 2021-09-14

## 2021-09-14 VITALS
OXYGEN SATURATION: 92 % | RESPIRATION RATE: 18 BRPM | BODY MASS INDEX: 32.82 KG/M2 | SYSTOLIC BLOOD PRESSURE: 135 MMHG | HEART RATE: 81 BPM | WEIGHT: 179.45 LBS | TEMPERATURE: 97 F | DIASTOLIC BLOOD PRESSURE: 64 MMHG

## 2021-09-14 DIAGNOSIS — Z17.0 MALIGNANT NEOPLASM OF LOWER-OUTER QUADRANT OF LEFT BREAST OF FEMALE, ESTROGEN RECEPTOR POSITIVE (HCC): ICD-10-CM

## 2021-09-14 DIAGNOSIS — Z12.31 ENCOUNTER FOR SCREENING MAMMOGRAM FOR MALIGNANT NEOPLASM OF BREAST: Primary | ICD-10-CM

## 2021-09-14 DIAGNOSIS — C50.512 MALIGNANT NEOPLASM OF LOWER-OUTER QUADRANT OF LEFT BREAST OF FEMALE, ESTROGEN RECEPTOR POSITIVE (HCC): ICD-10-CM

## 2021-09-14 PROCEDURE — 99214 OFFICE O/P EST MOD 30 MIN: CPT | Performed by: INTERNAL MEDICINE

## 2021-09-14 PROCEDURE — G0463 HOSPITAL OUTPT CLINIC VISIT: HCPCS | Performed by: INTERNAL MEDICINE

## 2021-09-14 RX ORDER — INSULIN ASPART 100 [IU]/ML
INJECTION, SUSPENSION SUBCUTANEOUS
COMMUNITY
Start: 2021-08-26 | End: 2022-04-18 | Stop reason: SDUPTHER

## 2021-09-14 NOTE — PROGRESS NOTES
Patient  Gini Edmondson    Location  [facility] HEMATOLOGY & ONCOLOGY    Chief Complaint  Breast Cancer (-fu)    Referring Provider: HARLEY Thomason  PCP: Cheyenne Russell APRN    Subjective          Oncology/Hematology History Overview Note   Breast Cancer: Invasive Lobular Carcinoma, Left UOQ, 1.3 cm; Grade 2, ER: 100%, AZ: 10%, HER2: 0, Ki-67: 30%; Oncotype DX: 30    Treatment History:  - s/p LEFT lumpectomy and SLND (3/27/20)  - s/p re-excision (+) margin (1 mm residual dx found) (4/7/20)  - completed adjuvant XRT L breast: 4256 cGy (5/14-6/5/20)  - Started Femara May 2020  - DEXA August 2020-normal bone density     Malignant neoplasm of lower-outer quadrant of left breast of female, estrogen receptor positive (CMS/HCC)   2/20/2020 Surgery    Surgery       Procedure:  Ultrasound guided core needle biopsy      Location:  1:00 o'clock position      Invasive lobular carcinoma  ER/AZ positive   Her 2 ted negative  Ki-67 30%     3/7/2020 Cancer Staged    Staging form: Breast, AJCC 8th Edition  - Pathologic stage from 3/7/2020: Stage IA (pT1, pN0, cM0, G2, ER+, AZ+, HER2-, Oncotype DX score: 30) - Signed by Chanel Almonte APRN on 7/7/2021     3/27/2020 Initial Diagnosis    Malignant neoplasm of left breast in female, estrogen receptor positive (CMS/HCC)     3/27/2020 Surgery    Surgery       Procedure:  Left breast lumpectomy with SLNB          Invasive lobular carcinoma, 13 mm, ER/AZ positive, her 2 ted negative, 2/2 SLN negative, positive superior medial margin       5/14/2020 - 6/5/2020 Radiation    Radiation OncologyTreatment Course:  Gini Edmondson received 4256 cGy in 16 fractions to left breast     7/7/2020 Surgery    Surgery       Procedure:  Reexcision of positive superior margin       Pathology revealed ILC single minute focus with negative margins.          History of Present Illness  Patient comes in today for routine follow-up after her breast cancer  diagnosis.  She had a left lumpectomy in 2020 and has been on Femara.  She is very tearful about the fact that she has lost several family members in the past couple of weeks.  Specifically her 's ex-wife and daughter  the same day and was followed soon after by a grandson.  These family members were in [place] and the patient is attempting to take a trip out there.    It is obvious from a conversation the patient is also worried about her breast.  She says that she had had some itching of her left breast over the past couple of months and does notice some swelling at times.  Over the summer she has been doing a lot of radha so she has been lifting heavy pots filled with jars and water several times a week.  I reviewed the results of her past mammogram which was done in March of this year.  It does show that she has a large seroma measuring between 8 and 10 cm in size.    Review of Systems   Constitutional: Positive for fatigue (9/10). Negative for appetite change, diaphoresis, fever, unexpected weight gain and unexpected weight loss.   HENT: Negative for hearing loss, mouth sores, sore throat, swollen glands, trouble swallowing and voice change.    Eyes: Negative for blurred vision.   Respiratory: Positive for cough and shortness of breath. Negative for wheezing.    Cardiovascular: Negative for chest pain and palpitations.   Gastrointestinal: Negative for abdominal pain, blood in stool, constipation, diarrhea, nausea and vomiting.   Endocrine: Negative for cold intolerance and heat intolerance.   Genitourinary: Positive for breast pain (L breast - shooting pain). Negative for difficulty urinating, dysuria, frequency, hematuria and urinary incontinence.   Musculoskeletal: Negative for arthralgias, back pain and myalgias.   Skin: Negative for rash, skin lesions and bruise.   Neurological: Negative for dizziness, seizures, weakness, numbness and headache.   Hematological: Does not bruise/bleed  easily.   Psychiatric/Behavioral: Negative for depressed mood. The patient is nervous/anxious.        Past Medical History:   Diagnosis Date   • Allergy to sunlight     PER PT   • Cancer of left breast (CMS/HCC)     INVASVIE LOBULAR CARCINOMA   • Cardiac disorder     DR. MALCOLM ANTHONY   • Condition not found     RIGHT EYE CATARACT (LT CATARACT REPAIRED)   • Constipation    • Heart attack (CMS/HCC)     3-22-18 NON STEMI, CABG 3-23-18   • Hx of bronchitis     SMOKES 5-7 CIGERETTS PER DAY   • Hx of CABG     3V CABG 2018   • Hypertension     MEDS CONTROL   • Neuropathy     IN FEETH FROM DM   • PAD (peripheral artery disease) (CMS/HCC)    • Raynaud's syndrome    • Status post left breast lumpectomy      Past Surgical History:   Procedure Laterality Date   • BREAST LUMPECTOMY      LEFT   • COLONOSCOPY     • CORONARY ARTERY BYPASS GRAFT     • LAPAROSCOPIC TUBAL LIGATION     • OTHER SURGICAL HISTORY      BILAT PAD LEGS   • OTHER SURGICAL HISTORY      CHIP IN LEFT BREAST, MARKER FOR SPOT   • OTHER SURGICAL HISTORY      LEFT KNEE REPAIR     Social History     Socioeconomic History   • Marital status:      Spouse name: Not on file   • Number of children: Not on file   • Years of education: Not on file   • Highest education level: Not on file   Tobacco Use   • Smoking status: Current Every Day Smoker     Start date: 7/7/1970   • Smokeless tobacco: Never Used   • Tobacco comment: 5-7 CIGARETTES   Vaping Use   • Vaping Use: Never used   Substance and Sexual Activity   • Alcohol use: Not Currently   • Drug use: Never   • Sexual activity: Defer     Family History   Problem Relation Age of Onset   • Prostate cancer Father    • Diabetes Father    • Pancreatic cancer Brother    • Diabetes Brother    • Breast cancer Maternal Aunt    • Breast cancer Maternal Aunt    • Stroke Mother    • Breast cancer Paternal Aunt    • Diabetes Other    • Hypertension Other        Objective   Physical Exam  General: Alert, cooperative, no acute  distress  Breasts: Left breast has a large firm area lateral to the areola that is consistent with a fluid-filled seroma.  She has no axillary adenopathy.  She has no abnormal masses in either breast.  Eyes: Anicteric sclera, PERRLA  Respiratory: normal respiratory effort  Cardiovascular: no lower extremity edema  Skin: Normal tone, no rash, no lesions  Psychiatric: Appropriate affect, intact judgment  Neurologic: No focal sensory or motor deficits, normal cognition   Musculoskeletal: Normal muscle strength and tone  Extremities: No clubbing, cyanosis, or deformities    Vitals:    09/14/21 1315   BP: 135/64   Pulse: 81   Resp: 18   Temp: 97 °F (36.1 °C)   SpO2: 92%   Weight: 81.4 kg (179 lb 7.3 oz)   PainSc:   4   PainLoc: Breast  Comment: left- sharp - shooting pain- comes and goes     ECOG score: 0         PHQ-9 Total Score:         Result Review :   The following data was reviewed by: Pat Dawkins MD PhD on 09/14/2021:  Lab Results   Component Value Date    HGB 14.9 08/26/2021    HCT 47.1 (H) 08/26/2021    MCV 89.0 08/26/2021     08/26/2021    WBC 8.66 08/26/2021    NEUTROABS 4.20 08/26/2021    LYMPHSABS 3.50 08/26/2021    MONOSABS 0.68 08/26/2021    EOSABS 0.20 08/26/2021    BASOSABS 0.04 08/26/2021     Lab Results   Component Value Date    BUN 13 04/20/2020    CREATININE 0.8 04/20/2020     04/20/2020    K 4.3 04/20/2020     (H) 04/20/2020    CO2 28 04/20/2020    CALCIUM 9.3 04/20/2020    PROTEINTOT 7.0 04/20/2020    ALBUMIN 4.3 04/20/2020    BILITOT 0.2 04/20/2020    ALKPHOS 60 04/20/2020    AST 30 04/20/2020    ALT 20 04/20/2020          Assessment and Plan    Diagnoses and all orders for this visit:    1. Encounter for screening mammogram for malignant neoplasm of breast (Primary)  -     Mammo Screening Bilateral With CAD; Future    2. Malignant neoplasm of lower-outer quadrant of left breast of female, estrogen receptor positive (CMS/HCC)      Hormone receptor positive left breast  cancer: Patient was treated with left lumpectomy and has a large seroma that is causing some itching and pain.  Based on exam I find this unlikely to be related to a cancer recurrence but is quite likely related to the seroma.  I reassured the patient this is a common finding.  She should continue annual screening mammograms.  Her next would be due in March of this coming year.  Patient will continue to monitor her breast swelling if it becomes a problem she will contact our clinic or self refer back to [facility].  She is tolerating Femara well and will continue that also.  Her next bone density test will be in August 2022.    Patient was given instructions and counseling regarding her condition or for health maintenance advice. Please see specific information pulled into the AVS if appropriate.     Pat Dawkins MD PhD    9/14/2021

## 2021-12-02 ENCOUNTER — OFFICE VISIT (OUTPATIENT)
Dept: CARDIAC REHAB | Facility: HOSPITAL | Age: 71
End: 2021-12-02

## 2021-12-02 VITALS
HEART RATE: 87 BPM | WEIGHT: 181.66 LBS | BODY MASS INDEX: 33.43 KG/M2 | HEIGHT: 62 IN | OXYGEN SATURATION: 96 % | SYSTOLIC BLOOD PRESSURE: 122 MMHG | DIASTOLIC BLOOD PRESSURE: 70 MMHG

## 2021-12-02 DIAGNOSIS — I73.9 PAD (PERIPHERAL ARTERY DISEASE) (HCC): Primary | ICD-10-CM

## 2021-12-02 PROCEDURE — 93798 PHYS/QHP OP CAR RHAB W/ECG: CPT

## 2021-12-02 NOTE — PROGRESS NOTES
Chief Complaint  Cardiac Rehab Phase II    Gini Edmondson presents to [facility] CARDIOPULMONARY REHABILITATION    Physical Exam  Cardiovascular:      Rate and Rhythm: Normal rate and regular rhythm.      Heart sounds: Normal heart sounds.   Pulmonary:      Effort: Pulmonary effort is normal.      Breath sounds: Examination of the right-lower field reveals decreased breath sounds. Examination of the left-lower field reveals decreased breath sounds. Decreased breath sounds present.   Musculoskeletal:      Right lower leg: No edema.      Left lower leg: No edema.        Result Review :          Assessment and Plan    Diagnoses and all orders for this visit:    1. PAD (peripheral artery disease) (HCC) (Primary)        Ace Zarate RN

## 2021-12-02 NOTE — PROGRESS NOTES
Cardiac Rehab Phase I - Occurrence #1    Education Topics:   [] Angina  [] Arrhthymias   [] CABG   [] Cardiac Cath  [] CHF  [] Discharge Teaching  [] MI  [] Risk Factors  [] No Phase I Need Assessed      Topic Components:   [] A&P  [] Blood Pressure  [] Cardiac Cath  [] Coronary Artery Anatomy  [] Diabetes  [] Diet  [] Exercise  [] Family History  [] Heart Function  [] Lipids  [] Smoking  [] Stress Management   [] Weight Control      Teaching Aids:  [] Phase 2 Brochure  [] Cath Film  [] CHF Teaching Packet  [] Mending Hearts Visitor   [] MI/Angina Teaching Packet  [] Open Heart Book  [] Pulmonary Rehab Brochure       Teaching Method:  [] Discussion  [] Written Instruction  [] Closed Circuit TV  [] Audiovisual  [] Demonstration      Teaching Recipient:  [] Patient   [] Family  [] Friend  [] Legal Guardian  [] Primary Caregiver  [] Significant Other      Barriers to Learning:  [] None  [] Auditory   [] Cultural   [] Emotional   [] Financial   [] Language   [] Mental  [] Motivational   [] Physical   [] Reading Skills  [] Gnosticism   [] Verbal/Cognitive   [] Visual   [] Written/Cognitive      Teaching Response:  [] Verified Via Teachback   [] Return Demo via Teachback  [] Reinforcement Needed  [] Unable to Return Demo  [] Unable to Comprehend  [] Declines Teaching      Recovery/Discharge Instructions:  []  Cardiac Rehab Phase 2  []  Cardiac Rehab Phase 3  []  Daily Weights  []  Follow Up Care  []  Medication  []  MI/Angina Discharge  []  Progressive Act/Exercise  []  Pulse Taking  []  Signs/Symptoms      Cardiac Rehab Phase I Comment    Phase II and III Education    Discipline Teaching:  Cardiac Rehab Phase II []      Cardiac Rehab Phase III []      Pulmonary Rehab II []      Pulmonary Rehab III []      Peripheral Artery Disease []        Class Given:  Asthma Management []      Beginners Cardiac Rehab []      Beginners Pulmonary Rehab []      CAD I []      CAD II []      CHF []      Diabetes Mellitus []     Diet &  Cholesterol []     Diet Consult []      Energy Conservation []     Exercise []     Grocery Store Tour []     Harmonica Therapy []     High Blood Pressure []     Living with COPD []     Medication Safety []     Peripheral Artery Disease []     S & S of Infection []      Stress []        Education Topics:  Angina []      Arrhythmias []      Asthma Management []      Beginning Cardiac Rehab []      Blood Pressure []     Blood Sugar []     Breathing Retrainment []     CHF []     Cooking []     Daily Weight []     Diabetes Mellitus []      Diet []     Energy Conservation []     Exercise []      Foot Care []      Grocery Store Tour []      Heart Disease []      Heart Function []      Incision Care []     Living with COPD []     Medication Safety []     PAD Symptoms/Treatment []     Reconditioning Exercises []     S & S Infection []     Smoking Consult []     Stress Management []     Test Results []       Teaching Aids:  Video []      PAD Handout []      Pulmonary Workbook []      CAD Teaching Packet []      Stress Teaching Packet []     Exercise Teaching Packet []      Diet Teaching Packet []      CHF Teaching Packet []      Blood Pressure Teaching Packet []      Smoking Cessation Packet []      Medication Safety Handout []      Pflex Training []      Diabetes Teaching Packet []      Harmonica Therapy Packet []        Teaching Method:  Discussion []      Written Information []      Audio Visual []      Demonstration []        Teaching Recipient:  Patient []      Family []      Friend []      Legal Guardian []      Primary Care Giver []      Significant Other []        Barriers To Learning:  None []      Auditory []      Cultural []      Emotional []      Financial []      Language []    Mental []      Motivation []      Physical []      Reading Skills []      Orthodox []      Verbal/Cognitive []      Visual []      Written/Cognitive []        Teaching Response:  Verified Via Teach back []      Return Demo Via Teach Back  []      Reinforcement Needed []      Unable to Return Demo []      Unable to Comprehend []      Declines Teaching  []        Additional Education Topics:      Follow Up Instruction Comment:    Tolerated exercise well.Pt tolerated exercises see scanned report.Phase II and III Education    Discipline Teaching:  Cardiac Rehab Phase II [x]      Cardiac Rehab Phase III []      Pulmonary Rehab II []      Pulmonary Rehab III []      Peripheral Artery Disease []        Class Given:  Asthma Management []      Beginners Cardiac Rehab [x]      Beginners Pulmonary Rehab []      CAD I []      CAD II []      CHF []      Diabetes Mellitus []     Diet & Cholesterol []     Diet Consult []      Energy Conservation []     Exercise []     Grocery Store Tour []     Harmonica Therapy []     High Blood Pressure []     Living with COPD []     Medication Safety []     Peripheral Artery Disease []     S & S of Infection []      Stress []        Education Topics:  Angina []      Arrhythmias []      Asthma Management []      Beginning Cardiac Rehab [x]      Blood Pressure []     Blood Sugar []     Breathing Retrainment []     CHF []     Cooking []     Daily Weight []     Diabetes Mellitus []      Diet []     Energy Conservation []     Exercise [x]      Foot Care []      Grocery Store Tour []      Heart Disease [x]      Heart Function []      Incision Care []     Living with COPD []     Medication Safety []     PAD Symptoms/Treatment [x]     Reconditioning Exercises []     S & S Infection []     Smoking Consult []     Stress Management []     Test Results []       Teaching Aids:  Video []      PAD Handout [x]      Pulmonary Workbook []      CAD Teaching Packet [x]      Stress Teaching Packet []     Exercise Teaching Packet []      Diet Teaching Packet []      CHF Teaching Packet []      Blood Pressure Teaching Packet []      Smoking Cessation Packet [x]      Medication Safety Handout []      Pflex Training []      Diabetes Teaching Packet [x]       Harmonica Therapy Packet []        Teaching Method:  Discussion [x]      Written Information [x]      Audio Visual []      Demonstration []        Teaching Recipient:  Patient [x]      Family []      Friend []      Legal Guardian []      Primary Care Giver []      Significant Other []        Barriers To Learning:  None [x]      Auditory []      Cultural []      Emotional []      Financial []      Language []    Mental []      Motivation []      Physical []      Reading Skills []      Sabianist []      Verbal/Cognitive []      Visual []      Written/Cognitive []        Teaching Response:  Verified Via Teach back []      Return Demo Via Teach Back []      Reinforcement Needed []      Unable to Return Demo []      Unable to Comprehend []      Declines Teaching  []        Additional Education Topics:    Follow Up Instruction Comment:

## 2021-12-09 ENCOUNTER — TREATMENT (OUTPATIENT)
Dept: CARDIAC REHAB | Facility: HOSPITAL | Age: 71
End: 2021-12-09

## 2021-12-09 DIAGNOSIS — I73.9 PAD (PERIPHERAL ARTERY DISEASE) (HCC): Primary | ICD-10-CM

## 2021-12-09 LAB — GLUCOSE BLDC GLUCOMTR-MCNC: 117 MG/DL (ref 70–99)

## 2021-12-09 PROCEDURE — 82962 GLUCOSE BLOOD TEST: CPT

## 2021-12-16 ENCOUNTER — APPOINTMENT (OUTPATIENT)
Dept: CARDIAC REHAB | Facility: HOSPITAL | Age: 71
End: 2021-12-16

## 2021-12-21 ENCOUNTER — APPOINTMENT (OUTPATIENT)
Dept: CARDIAC REHAB | Facility: HOSPITAL | Age: 71
End: 2021-12-21

## 2021-12-23 ENCOUNTER — APPOINTMENT (OUTPATIENT)
Dept: CARDIAC REHAB | Facility: HOSPITAL | Age: 71
End: 2021-12-23

## 2021-12-27 ENCOUNTER — TRANSCRIBE ORDERS (OUTPATIENT)
Dept: ADMINISTRATIVE | Facility: HOSPITAL | Age: 71
End: 2021-12-27

## 2021-12-27 DIAGNOSIS — E04.9 THYROID GOITER: Primary | ICD-10-CM

## 2021-12-28 ENCOUNTER — APPOINTMENT (OUTPATIENT)
Dept: CARDIAC REHAB | Facility: HOSPITAL | Age: 71
End: 2021-12-28

## 2021-12-30 ENCOUNTER — APPOINTMENT (OUTPATIENT)
Dept: CARDIAC REHAB | Facility: HOSPITAL | Age: 71
End: 2021-12-30

## 2022-01-04 ENCOUNTER — APPOINTMENT (OUTPATIENT)
Dept: CARDIAC REHAB | Facility: HOSPITAL | Age: 72
End: 2022-01-04

## 2022-01-06 ENCOUNTER — APPOINTMENT (OUTPATIENT)
Dept: CARDIAC REHAB | Facility: HOSPITAL | Age: 72
End: 2022-01-06

## 2022-01-10 RX ORDER — LETROZOLE 2.5 MG/1
2.5 TABLET, FILM COATED ORAL DAILY
Qty: 90 TABLET | Refills: 1 | Status: SHIPPED | OUTPATIENT
Start: 2022-01-10 | End: 2022-07-07

## 2022-01-11 ENCOUNTER — APPOINTMENT (OUTPATIENT)
Dept: CARDIAC REHAB | Facility: HOSPITAL | Age: 72
End: 2022-01-11

## 2022-01-13 ENCOUNTER — APPOINTMENT (OUTPATIENT)
Dept: CARDIAC REHAB | Facility: HOSPITAL | Age: 72
End: 2022-01-13

## 2022-01-18 ENCOUNTER — APPOINTMENT (OUTPATIENT)
Dept: CARDIAC REHAB | Facility: HOSPITAL | Age: 72
End: 2022-01-18

## 2022-01-20 ENCOUNTER — HOSPITAL ENCOUNTER (OUTPATIENT)
Dept: ULTRASOUND IMAGING | Facility: HOSPITAL | Age: 72
Discharge: HOME | End: 2022-01-20
Admitting: NURSE PRACTITIONER

## 2022-01-20 ENCOUNTER — APPOINTMENT (OUTPATIENT)
Dept: CARDIAC REHAB | Facility: HOSPITAL | Age: 72
End: 2022-01-20

## 2022-01-20 DIAGNOSIS — E04.9 THYROID GOITER: ICD-10-CM

## 2022-01-20 PROCEDURE — 76536 US EXAM OF HEAD AND NECK: CPT

## 2022-01-25 ENCOUNTER — APPOINTMENT (OUTPATIENT)
Dept: CARDIAC REHAB | Facility: HOSPITAL | Age: 72
End: 2022-01-25

## 2022-01-27 ENCOUNTER — APPOINTMENT (OUTPATIENT)
Dept: CARDIAC REHAB | Facility: HOSPITAL | Age: 72
End: 2022-01-27

## 2022-02-01 ENCOUNTER — APPOINTMENT (OUTPATIENT)
Dept: CARDIAC REHAB | Facility: HOSPITAL | Age: 72
End: 2022-02-01

## 2022-02-01 ENCOUNTER — TRANSCRIBE ORDERS (OUTPATIENT)
Dept: ADMINISTRATIVE | Facility: HOSPITAL | Age: 72
End: 2022-02-01

## 2022-02-01 DIAGNOSIS — R93.89 ABNORMAL THYROID ULTRASOUND: Primary | ICD-10-CM

## 2022-02-01 DIAGNOSIS — E04.2 MULTIPLE THYROID NODULES: ICD-10-CM

## 2022-02-08 ENCOUNTER — APPOINTMENT (OUTPATIENT)
Dept: CARDIAC REHAB | Facility: HOSPITAL | Age: 72
End: 2022-02-08

## 2022-02-09 ENCOUNTER — HOSPITAL ENCOUNTER (OUTPATIENT)
Dept: ULTRASOUND IMAGING | Facility: HOSPITAL | Age: 72
Discharge: HOME | End: 2022-02-09
Admitting: NURSE PRACTITIONER

## 2022-02-09 DIAGNOSIS — R93.89 ABNORMAL THYROID ULTRASOUND: ICD-10-CM

## 2022-02-09 DIAGNOSIS — E04.1 THYROID NODULE INCIDENTALLY NOTED ON IMAGING STUDY: ICD-10-CM

## 2022-02-09 DIAGNOSIS — E04.2 MULTIPLE THYROID NODULES: ICD-10-CM

## 2022-02-09 PROCEDURE — 76942 ECHO GUIDE FOR BIOPSY: CPT

## 2022-02-09 PROCEDURE — 0 LIDOCAINE 1 % SOLUTION: Performed by: NURSE PRACTITIONER

## 2022-02-09 PROCEDURE — 88173 CYTOPATH EVAL FNA REPORT: CPT | Performed by: NURSE PRACTITIONER

## 2022-02-09 RX ORDER — LIDOCAINE HYDROCHLORIDE 10 MG/ML
10 INJECTION, SOLUTION INFILTRATION; PERINEURAL ONCE
Status: COMPLETED | OUTPATIENT
Start: 2022-02-09 | End: 2022-02-09

## 2022-02-09 RX ADMIN — LIDOCAINE HYDROCHLORIDE 10 ML: 10 INJECTION, SOLUTION INFILTRATION; PERINEURAL at 11:00

## 2022-02-10 ENCOUNTER — APPOINTMENT (OUTPATIENT)
Dept: CARDIAC REHAB | Facility: HOSPITAL | Age: 72
End: 2022-02-10

## 2022-02-11 LAB
CYTO UR: NORMAL
LAB AP CASE REPORT: NORMAL
LAB AP CLINICAL INFORMATION: NORMAL
LAB AP NON-GYN SPECIMEN ADEQUACY: NORMAL
PATH REPORT.FINAL DX SPEC: NORMAL
PATH REPORT.GROSS SPEC: NORMAL

## 2022-02-15 ENCOUNTER — APPOINTMENT (OUTPATIENT)
Dept: CARDIAC REHAB | Facility: HOSPITAL | Age: 72
End: 2022-02-15

## 2022-02-17 ENCOUNTER — APPOINTMENT (OUTPATIENT)
Dept: CARDIAC REHAB | Facility: HOSPITAL | Age: 72
End: 2022-02-17

## 2022-02-22 ENCOUNTER — APPOINTMENT (OUTPATIENT)
Dept: CARDIAC REHAB | Facility: HOSPITAL | Age: 72
End: 2022-02-22

## 2022-02-24 ENCOUNTER — APPOINTMENT (OUTPATIENT)
Dept: CARDIAC REHAB | Facility: HOSPITAL | Age: 72
End: 2022-02-24

## 2022-03-01 ENCOUNTER — APPOINTMENT (OUTPATIENT)
Dept: CARDIAC REHAB | Facility: HOSPITAL | Age: 72
End: 2022-03-01

## 2022-03-03 ENCOUNTER — APPOINTMENT (OUTPATIENT)
Dept: CARDIAC REHAB | Facility: HOSPITAL | Age: 72
End: 2022-03-03

## 2022-03-08 ENCOUNTER — APPOINTMENT (OUTPATIENT)
Dept: CARDIAC REHAB | Facility: HOSPITAL | Age: 72
End: 2022-03-08

## 2022-03-09 ENCOUNTER — TRANSCRIBE ORDERS (OUTPATIENT)
Dept: ONCOLOGY | Facility: HOSPITAL | Age: 72
End: 2022-03-09

## 2022-03-09 DIAGNOSIS — Z12.31 SCREENING MAMMOGRAM, ENCOUNTER FOR: Primary | ICD-10-CM

## 2022-03-10 ENCOUNTER — APPOINTMENT (OUTPATIENT)
Dept: MAMMOGRAPHY | Facility: HOSPITAL | Age: 72
End: 2022-03-10

## 2022-03-14 ENCOUNTER — APPOINTMENT (OUTPATIENT)
Dept: ONCOLOGY | Facility: HOSPITAL | Age: 72
End: 2022-03-14

## 2022-03-15 ENCOUNTER — APPOINTMENT (OUTPATIENT)
Dept: CARDIAC REHAB | Facility: HOSPITAL | Age: 72
End: 2022-03-15

## 2022-03-17 ENCOUNTER — APPOINTMENT (OUTPATIENT)
Dept: CARDIAC REHAB | Facility: HOSPITAL | Age: 72
End: 2022-03-17

## 2022-03-22 ENCOUNTER — APPOINTMENT (OUTPATIENT)
Dept: CARDIAC REHAB | Facility: HOSPITAL | Age: 72
End: 2022-03-22

## 2022-03-24 ENCOUNTER — APPOINTMENT (OUTPATIENT)
Dept: CARDIAC REHAB | Facility: HOSPITAL | Age: 72
End: 2022-03-24

## 2022-03-29 ENCOUNTER — APPOINTMENT (OUTPATIENT)
Dept: CARDIAC REHAB | Facility: HOSPITAL | Age: 72
End: 2022-03-29

## 2022-03-31 ENCOUNTER — APPOINTMENT (OUTPATIENT)
Dept: CARDIAC REHAB | Facility: HOSPITAL | Age: 72
End: 2022-03-31

## 2022-04-05 ENCOUNTER — APPOINTMENT (OUTPATIENT)
Dept: CARDIAC REHAB | Facility: HOSPITAL | Age: 72
End: 2022-04-05

## 2022-04-07 ENCOUNTER — APPOINTMENT (OUTPATIENT)
Dept: CARDIAC REHAB | Facility: HOSPITAL | Age: 72
End: 2022-04-07

## 2022-04-15 ENCOUNTER — HOSPITAL ENCOUNTER (OUTPATIENT)
Dept: MAMMOGRAPHY | Facility: HOSPITAL | Age: 72
Discharge: HOME | End: 2022-04-15
Admitting: INTERNAL MEDICINE

## 2022-04-15 DIAGNOSIS — Z12.31 SCREENING MAMMOGRAM, ENCOUNTER FOR: ICD-10-CM

## 2022-04-15 PROCEDURE — 77063 BREAST TOMOSYNTHESIS BI: CPT

## 2022-04-15 PROCEDURE — 77067 SCR MAMMO BI INCL CAD: CPT

## 2022-04-18 ENCOUNTER — OFFICE VISIT (OUTPATIENT)
Dept: ONCOLOGY | Facility: HOSPITAL | Age: 72
End: 2022-04-18

## 2022-04-18 VITALS
TEMPERATURE: 97 F | BODY MASS INDEX: 33.23 KG/M2 | OXYGEN SATURATION: 92 % | SYSTOLIC BLOOD PRESSURE: 123 MMHG | HEART RATE: 85 BPM | DIASTOLIC BLOOD PRESSURE: 68 MMHG | RESPIRATION RATE: 16 BRPM | WEIGHT: 181.66 LBS

## 2022-04-18 DIAGNOSIS — Z78.0 POST-MENOPAUSAL: ICD-10-CM

## 2022-04-18 DIAGNOSIS — C50.512 MALIGNANT NEOPLASM OF LOWER-OUTER QUADRANT OF LEFT BREAST OF FEMALE, ESTROGEN RECEPTOR POSITIVE: Primary | ICD-10-CM

## 2022-04-18 DIAGNOSIS — Z17.0 MALIGNANT NEOPLASM OF LOWER-OUTER QUADRANT OF LEFT BREAST OF FEMALE, ESTROGEN RECEPTOR POSITIVE: Primary | ICD-10-CM

## 2022-04-18 PROCEDURE — 99214 OFFICE O/P EST MOD 30 MIN: CPT | Performed by: INTERNAL MEDICINE

## 2022-04-18 PROCEDURE — G0463 HOSPITAL OUTPT CLINIC VISIT: HCPCS | Performed by: INTERNAL MEDICINE

## 2022-04-19 ENCOUNTER — APPOINTMENT (OUTPATIENT)
Dept: CARDIAC REHAB | Facility: HOSPITAL | Age: 72
End: 2022-04-19

## 2022-04-21 ENCOUNTER — TREATMENT (OUTPATIENT)
Dept: CARDIAC REHAB | Facility: HOSPITAL | Age: 72
End: 2022-04-21

## 2022-04-21 DIAGNOSIS — I73.9 PAD (PERIPHERAL ARTERY DISEASE): Primary | ICD-10-CM

## 2022-04-21 PROCEDURE — 93668 PERIPHERAL VASCULAR REHAB: CPT

## 2022-04-21 NOTE — PROGRESS NOTES
Patient tolerated  PAD rehab exercise session without difficulty.  See exercise session report for details.

## 2022-04-26 ENCOUNTER — TREATMENT (OUTPATIENT)
Dept: CARDIAC REHAB | Facility: HOSPITAL | Age: 72
End: 2022-04-26

## 2022-04-26 ENCOUNTER — APPOINTMENT (OUTPATIENT)
Dept: CARDIAC REHAB | Facility: HOSPITAL | Age: 72
End: 2022-04-26

## 2022-04-26 DIAGNOSIS — I73.9 PAD (PERIPHERAL ARTERY DISEASE): Primary | ICD-10-CM

## 2022-04-26 PROCEDURE — 93668 PERIPHERAL VASCULAR REHAB: CPT

## 2022-04-28 ENCOUNTER — APPOINTMENT (OUTPATIENT)
Dept: CARDIAC REHAB | Facility: HOSPITAL | Age: 72
End: 2022-04-28

## 2022-04-28 ENCOUNTER — TRANSCRIBE ORDERS (OUTPATIENT)
Dept: VASCULAR SURGERY | Facility: HOSPITAL | Age: 72
End: 2022-04-28

## 2022-04-28 ENCOUNTER — TREATMENT (OUTPATIENT)
Dept: CARDIAC REHAB | Facility: HOSPITAL | Age: 72
End: 2022-04-28

## 2022-04-28 DIAGNOSIS — I73.9 PAD (PERIPHERAL ARTERY DISEASE): Primary | ICD-10-CM

## 2022-04-28 DIAGNOSIS — I73.9 CLAUDICATION: ICD-10-CM

## 2022-04-28 PROCEDURE — 93668 PERIPHERAL VASCULAR REHAB: CPT

## 2022-05-03 ENCOUNTER — APPOINTMENT (OUTPATIENT)
Dept: CARDIAC REHAB | Facility: HOSPITAL | Age: 72
End: 2022-05-03

## 2022-05-05 ENCOUNTER — APPOINTMENT (OUTPATIENT)
Dept: CARDIAC REHAB | Facility: HOSPITAL | Age: 72
End: 2022-05-05

## 2022-05-10 ENCOUNTER — APPOINTMENT (OUTPATIENT)
Dept: CARDIAC REHAB | Facility: HOSPITAL | Age: 72
End: 2022-05-10

## 2022-05-12 ENCOUNTER — APPOINTMENT (OUTPATIENT)
Dept: CARDIAC REHAB | Facility: HOSPITAL | Age: 72
End: 2022-05-12

## 2022-05-17 ENCOUNTER — APPOINTMENT (OUTPATIENT)
Dept: CARDIAC REHAB | Facility: HOSPITAL | Age: 72
End: 2022-05-17

## 2022-05-19 ENCOUNTER — APPOINTMENT (OUTPATIENT)
Dept: CARDIAC REHAB | Facility: HOSPITAL | Age: 72
End: 2022-05-19

## 2022-05-19 ENCOUNTER — TREATMENT (OUTPATIENT)
Dept: CARDIAC REHAB | Facility: HOSPITAL | Age: 72
End: 2022-05-19

## 2022-05-19 DIAGNOSIS — I73.9 PAD (PERIPHERAL ARTERY DISEASE): Primary | ICD-10-CM

## 2022-05-19 PROCEDURE — 93798 PHYS/QHP OP CAR RHAB W/ECG: CPT

## 2022-05-24 ENCOUNTER — APPOINTMENT (OUTPATIENT)
Dept: CARDIAC REHAB | Facility: HOSPITAL | Age: 72
End: 2022-05-24

## 2022-05-26 ENCOUNTER — APPOINTMENT (OUTPATIENT)
Dept: CARDIAC REHAB | Facility: HOSPITAL | Age: 72
End: 2022-05-26

## 2022-05-26 ENCOUNTER — TREATMENT (OUTPATIENT)
Dept: CARDIAC REHAB | Facility: HOSPITAL | Age: 72
End: 2022-05-26

## 2022-05-26 DIAGNOSIS — I73.9 PAD (PERIPHERAL ARTERY DISEASE): Primary | ICD-10-CM

## 2022-05-26 NOTE — PROGRESS NOTES
Patient tolerated  SET rehab exercise session without difficulty.  See exercise session report for details.

## 2022-05-31 ENCOUNTER — APPOINTMENT (OUTPATIENT)
Dept: CARDIAC REHAB | Facility: HOSPITAL | Age: 72
End: 2022-05-31

## 2022-05-31 ENCOUNTER — TREATMENT (OUTPATIENT)
Dept: CARDIAC REHAB | Facility: HOSPITAL | Age: 72
End: 2022-05-31

## 2022-05-31 DIAGNOSIS — I73.9 PAD (PERIPHERAL ARTERY DISEASE): Primary | ICD-10-CM

## 2022-05-31 PROCEDURE — 93798 PHYS/QHP OP CAR RHAB W/ECG: CPT

## 2022-06-02 ENCOUNTER — APPOINTMENT (OUTPATIENT)
Dept: CARDIAC REHAB | Facility: HOSPITAL | Age: 72
End: 2022-06-02

## 2022-06-07 ENCOUNTER — APPOINTMENT (OUTPATIENT)
Dept: CARDIAC REHAB | Facility: HOSPITAL | Age: 72
End: 2022-06-07

## 2022-06-09 ENCOUNTER — APPOINTMENT (OUTPATIENT)
Dept: CARDIAC REHAB | Facility: HOSPITAL | Age: 72
End: 2022-06-09

## 2022-06-14 ENCOUNTER — APPOINTMENT (OUTPATIENT)
Dept: CARDIAC REHAB | Facility: HOSPITAL | Age: 72
End: 2022-06-14

## 2022-06-16 ENCOUNTER — APPOINTMENT (OUTPATIENT)
Dept: CARDIAC REHAB | Facility: HOSPITAL | Age: 72
End: 2022-06-16

## 2022-06-21 ENCOUNTER — APPOINTMENT (OUTPATIENT)
Dept: CARDIAC REHAB | Facility: HOSPITAL | Age: 72
End: 2022-06-21

## 2022-06-23 ENCOUNTER — APPOINTMENT (OUTPATIENT)
Dept: CARDIAC REHAB | Facility: HOSPITAL | Age: 72
End: 2022-06-23

## 2022-06-28 ENCOUNTER — APPOINTMENT (OUTPATIENT)
Dept: CARDIAC REHAB | Facility: HOSPITAL | Age: 72
End: 2022-06-28

## 2022-06-30 ENCOUNTER — APPOINTMENT (OUTPATIENT)
Dept: CARDIAC REHAB | Facility: HOSPITAL | Age: 72
End: 2022-06-30

## 2022-07-05 ENCOUNTER — APPOINTMENT (OUTPATIENT)
Dept: CARDIAC REHAB | Facility: HOSPITAL | Age: 72
End: 2022-07-05

## 2022-07-07 ENCOUNTER — APPOINTMENT (OUTPATIENT)
Dept: CARDIAC REHAB | Facility: HOSPITAL | Age: 72
End: 2022-07-07

## 2022-07-07 RX ORDER — LETROZOLE 2.5 MG/1
2.5 TABLET, FILM COATED ORAL DAILY
Qty: 90 TABLET | Refills: 1 | Status: SHIPPED | OUTPATIENT
Start: 2022-07-07 | End: 2022-09-15 | Stop reason: HOSPADM

## 2022-07-12 ENCOUNTER — APPOINTMENT (OUTPATIENT)
Dept: CARDIAC REHAB | Facility: HOSPITAL | Age: 72
End: 2022-07-12

## 2022-07-14 ENCOUNTER — APPOINTMENT (OUTPATIENT)
Dept: CARDIAC REHAB | Facility: HOSPITAL | Age: 72
End: 2022-07-14

## 2022-07-19 ENCOUNTER — APPOINTMENT (OUTPATIENT)
Dept: CARDIAC REHAB | Facility: HOSPITAL | Age: 72
End: 2022-07-19

## 2022-07-21 ENCOUNTER — APPOINTMENT (OUTPATIENT)
Dept: CARDIAC REHAB | Facility: HOSPITAL | Age: 72
End: 2022-07-21

## 2022-07-26 ENCOUNTER — APPOINTMENT (OUTPATIENT)
Dept: CARDIAC REHAB | Facility: HOSPITAL | Age: 72
End: 2022-07-26

## 2022-07-27 ENCOUNTER — OFFICE VISIT (OUTPATIENT)
Dept: VASCULAR SURGERY | Facility: HOSPITAL | Age: 72
End: 2022-07-27

## 2022-07-27 ENCOUNTER — HOSPITAL ENCOUNTER (OUTPATIENT)
Dept: CARDIOLOGY | Facility: HOSPITAL | Age: 72
Discharge: HOME | End: 2022-07-27

## 2022-07-27 VITALS
HEART RATE: 72 BPM | SYSTOLIC BLOOD PRESSURE: 133 MMHG | OXYGEN SATURATION: 98 % | RESPIRATION RATE: 18 BRPM | DIASTOLIC BLOOD PRESSURE: 64 MMHG | TEMPERATURE: 97 F

## 2022-07-27 DIAGNOSIS — I73.9 CLAUDICATION: ICD-10-CM

## 2022-07-27 DIAGNOSIS — R09.89 DIMINISHED PULSES IN LOWER EXTREMITY: Primary | ICD-10-CM

## 2022-07-27 DIAGNOSIS — I70.213 ATHEROSCLEROSIS OF NATIVE ARTERY OF BOTH LOWER EXTREMITIES WITH INTERMITTENT CLAUDICATION: ICD-10-CM

## 2022-07-27 DIAGNOSIS — I73.9 PAD (PERIPHERAL ARTERY DISEASE): ICD-10-CM

## 2022-07-27 LAB
BH CV LOWER ARTERIAL LEFT ABI RATIO: 0.39
BH CV LOWER ARTERIAL LEFT DORSALIS PEDIS SYS MAX: 52
BH CV LOWER ARTERIAL LEFT GREAT TOE SYS MAX: 38
BH CV LOWER ARTERIAL LEFT LOW THIGH SYS MAX: 52
BH CV LOWER ARTERIAL LEFT POPLITEAL SYS MAX: 32
BH CV LOWER ARTERIAL LEFT POST TIBIAL SYS MAX: 31
BH CV LOWER ARTERIAL LEFT TBI RATIO: 0.29
BH CV LOWER ARTERIAL RIGHT ABI RATIO: 0.34
BH CV LOWER ARTERIAL RIGHT DORSALIS PEDIS SYS MAX: 45
BH CV LOWER ARTERIAL RIGHT GREAT TOE SYS MAX: 58
BH CV LOWER ARTERIAL RIGHT LOW THIGH SYS MAX: 46
BH CV LOWER ARTERIAL RIGHT POPLITEAL SYS MAX: 43
BH CV LOWER ARTERIAL RIGHT POST TIBIAL SYS MAX: 29
BH CV LOWER ARTERIAL RIGHT TBI RATIO: 0.44
MAXIMAL PREDICTED HEART RATE: 147 BPM
STRESS TARGET HR: 125 BPM
UPPER ARTERIAL LEFT ARM BRACHIAL SYS MAX: 133 MMHG
UPPER ARTERIAL RIGHT ARM BRACHIAL SYS MAX: 129 MMHG

## 2022-07-27 PROCEDURE — 93923 UPR/LXTR ART STDY 3+ LVLS: CPT | Performed by: SURGERY

## 2022-07-27 PROCEDURE — 99213 OFFICE O/P EST LOW 20 MIN: CPT | Performed by: NURSE PRACTITIONER

## 2022-07-27 PROCEDURE — 93923 UPR/LXTR ART STDY 3+ LVLS: CPT

## 2022-07-27 NOTE — PROGRESS NOTES
[facility] Vascular Surgery Office Follow Up Note     Date of Encounter: 07/27/2022     MRN Number: [id number]  Name: Gini Edmondson  Phone Number: [phone]     Referred By: Mihir Costello APRN  PCP: Cheyenne Russell APRN    Chief Complaint:    Chief Complaint   Patient presents with   • Follow-up     Patient is here as a one year follow up with lower extremity studies today. Patient states she has had an angioplasty approximately 2018.        Subjective      History of Present Illness:    Gini Edmondson is a 72 y.o. female presents for follow-up with an arterial Doppler performed today.  She had went to a couple PAD rehab but was not able to complete because of some surgeries and pain.  She describes lifestyle limiting claudication symptoms in bilateral lower extremities.  Recently went to [facility] with her family was unable to walk without stopping multiple times because of the calf pain. She is a current everyday smoker, smokes approximately half pack a day for 51 years.  She is diabetic, her HbA1c is now down to 7.8 from 9 and 2021.  She takes a daily 81 mg aspirin, a statin medication and Trental.     Review of Systems:  Review of Systems   Constitutional: Negative.   HENT: Negative.    Cardiovascular: Negative.    Respiratory: Negative.    Skin: Negative.    Musculoskeletal: Negative.    Gastrointestinal: Negative.    Neurological: Negative.    Psychiatric/Behavioral: Negative.      I have reviewed the following portions of the patient's history: allergies, current medications, past family history, past medical history, past social history, past surgical history and problem list and confirm it's accurate.    Allergies:  Allergies   Allergen Reactions   • Cilostazol Shortness Of Breath   • Sulfa Antibiotics Hives       Medications:      Current Outpatient Medications:   •  Accu-Chek FastClix Lancets misc, Inject 1 Device under the skin into the appropriate area as directed 2  (Two) Times a Day. as directed, Disp: , Rfl:   •  Accu-Chek Guide test strip, 1 each by Other route 3 (Three) Times a Day. as directed, Disp: , Rfl:   •  albuterol sulfate  (90 Base) MCG/ACT inhaler, Inhale 1 puff 2 (Two) Times a Day., Disp: , Rfl:   •  ASPIRIN 81 PO, Take 1 tablet/day by mouth Daily., Disp: , Rfl:   •  atorvastatin (LIPITOR) 20 MG tablet, Take 1 tablet by mouth Daily., Disp: , Rfl:   •  B-D ULTRAFINE III SHORT PEN 31G X 8 MM misc, Inject 1 application under the skin into the appropriate area as directed 2 (Two) Times a Day., Disp: , Rfl:   •  Biotin 1000 MCG tablet, Take 1 tablet by mouth Daily., Disp: , Rfl:   •  docusate sodium (COLACE) 100 MG capsule, Take 1 tablet by mouth Take As Directed., Disp: , Rfl:   •  escitalopram (LEXAPRO) 10 MG tablet, Take 10 mg by mouth Daily., Disp: , Rfl:   •  insulin NPH-insulin regular (humuLIN 70/30,novoLIN 70/30) (70-30) 100 UNIT/ML injection, Inject 50 Units under the skin into the appropriate area as directed 2 (Two) Times a Day With Meals., Disp: , Rfl:   •  letrozole (FEMARA) 2.5 MG tablet, TAKE 1 TABLET BY MOUTH DAILY, Disp: 90 tablet, Rfl: 1  •  Magnesium 500 MG capsule, Take 1 tablet by mouth As Needed., Disp: , Rfl:   •  metoprolol tartrate (LOPRESSOR) 25 MG tablet, Take 25 mg by mouth 2 (Two) Times a Day., Disp: , Rfl:   •  nitroglycerin (NITROSTAT) 0.4 MG SL tablet, Take 1 tablet by mouth Daily. USE AS DIRECTED, Disp: , Rfl:   •  Omega-3 Fatty Acids (fish oil) 1000 MG capsule capsule, Take 1 tablet by mouth Daily., Disp: , Rfl:   •  pentoxifylline (TRENtal) 400 MG CR tablet, Take 1 tablet by mouth 2 (two) times a day., Disp: , Rfl:   •  senna (SENOKOT) 8.6 MG tablet, Take 1 tablet by mouth Daily., Disp: , Rfl:   •  SITagliptin (JANUVIA) 100 MG tablet, Take 1 tablet by mouth Daily., Disp: , Rfl:     History:   Past Medical History:   Diagnosis Date   • Allergy to sunlight     PER PT   • Cancer of left breast (HCC)     INVASVIE LOBULAR CARCINOMA    • Cardiac disorder     DR. MALCOLM ANTHONY   • Condition not found     RIGHT EYE CATARACT (LT CATARACT REPAIRED)   • Constipation    • Heart attack (HCC)     3-22-18 NON STEMI, CABG 3-23-18   • Hx of bronchitis     SMOKES 5-7 CIGERETTS PER DAY   • Hx of CABG     3V CABG 2018   • Hypertension     MEDS CONTROL   • Neuropathy     IN FEETH FROM DM   • PAD (peripheral artery disease) (HCC)    • Raynaud's syndrome    • Status post left breast lumpectomy        Past Surgical History:   Procedure Laterality Date   • BREAST LUMPECTOMY      LEFT   • COLONOSCOPY     • CORONARY ARTERY BYPASS GRAFT     • LAPAROSCOPIC TUBAL LIGATION     • OTHER SURGICAL HISTORY      BILAT PAD LEGS   • OTHER SURGICAL HISTORY      CHIP IN LEFT BREAST, MARKER FOR SPOT   • OTHER SURGICAL HISTORY      LEFT KNEE REPAIR       Social History     Socioeconomic History   • Marital status:    Tobacco Use   • Smoking status: Current Every Day Smoker     Packs/day: 0.50     Years: 51.00     Pack years: 25.50     Start date: 7/7/1970   • Smokeless tobacco: Never Used   • Tobacco comment: 7-10 CIGARETTES   Vaping Use   • Vaping Use: Never used   Substance and Sexual Activity   • Alcohol use: Not Currently   • Drug use: Never   • Sexual activity: Defer        Family History   Problem Relation Age of Onset   • Prostate cancer Father    • Diabetes Father    • Pancreatic cancer Brother    • Diabetes Brother    • Breast cancer Maternal Aunt    • Breast cancer Maternal Aunt    • Stroke Mother    • Breast cancer Paternal Aunt    • Diabetes Other    • Hypertension Other        Objective     Physical Exam:  Vitals:    07/27/22 1522 07/27/22 1524   BP: 129/66 133/64   BP Location: Right arm Left arm   Patient Position: Sitting Sitting   Cuff Size: Large Adult Large Adult   Pulse: 72    Resp: 18    Temp: 97 °F (36.1 °C)    TempSrc: Temporal    SpO2: 98%    PainSc:   2       There is no height or weight on file to calculate BMI.    Physical Exam  Physical  Exam  Constitutional:       Appearance: Normal appearance.   HENT:      Head: Normocephalic.   Cardiovascular:      Rate and Rhythm: Normal rate.      Pulses: Normal pulses.      Comments: Bilateral lower extremities: Nonpalpable dorsalis pedis and posterior tibial pulses.  Pulmonary:      Effort: Pulmonary effort is normal.   Musculoskeletal:         General: Normal range of motion.      Cervical back: Normal range of motion.   Skin:     General: Skin is warm and dry.      Capillary Refill: Capillary refill takes less than 2 seconds.      Comments: No open ulcers.  Neurological:      General: No focal deficit present.      Mental Status: Alert and oriented to person, place, and time.   Psychiatric:         Mood and Affect: Mood normal.         Behavior: Behavior normal.    Imaging/Labs:  I have reviewed the preliminary results of the arterial Doppler performed today.  The Doppler reveals ABIs of 0.34 on the left and 0.39 on the left.  She has a combination of monophasic waveforms along with a absent peroneal on the right.        Assessment / Plan      Assessment / Plan:  Diagnoses and all orders for this visit:    1. Diminished pulses in lower extremity (Primary)  -     CT Angio Abdominal Aorta Bilateral Iliofem Runoff; Future    2. Atherosclerosis of native artery of both lower extremities with intermittent claudication (HCC)  -     CT Angio Abdominal Aorta Bilateral Iliofem Runoff; Future       Ms. Edmondson has severe lifestyle limiting claudication symptoms and the duplex today reveals ABIs of 0.34 on the right and 0.39 on the left.  Given her failed medical management of PAD rehabilitation and Trental therapy, I recommend we obtain a CTA of the abdomen pelvis with runoff and follow-up in the office.  I have answered all of her questions and she is in agreement with the plan at this time.    Thank you for allowing me to participate in your patient's care.        Follow Up:   Return for cta ab/pel with runoff.    Or sooner for any further concerns or worsening sign and symptoms. If unable to reach us in the office please dial 911 or go to the nearest emergency department.      Mihir LOUIE  [facility] Vascular Surgery

## 2022-07-28 ENCOUNTER — APPOINTMENT (OUTPATIENT)
Dept: CARDIAC REHAB | Facility: HOSPITAL | Age: 72
End: 2022-07-28

## 2022-08-02 ENCOUNTER — APPOINTMENT (OUTPATIENT)
Dept: CARDIAC REHAB | Facility: HOSPITAL | Age: 72
End: 2022-08-02

## 2022-08-04 ENCOUNTER — APPOINTMENT (OUTPATIENT)
Dept: CARDIAC REHAB | Facility: HOSPITAL | Age: 72
End: 2022-08-04

## 2022-08-08 ENCOUNTER — TRANSCRIBE ORDERS (OUTPATIENT)
Dept: ADMINISTRATIVE | Facility: HOSPITAL | Age: 72
End: 2022-08-08

## 2022-08-08 DIAGNOSIS — I73.89 OTHER SPECIFIED PERIPHERAL VASCULAR DISEASES: Primary | ICD-10-CM

## 2022-08-09 ENCOUNTER — APPOINTMENT (OUTPATIENT)
Dept: CARDIAC REHAB | Facility: HOSPITAL | Age: 72
End: 2022-08-09

## 2022-08-11 ENCOUNTER — APPOINTMENT (OUTPATIENT)
Dept: CARDIAC REHAB | Facility: HOSPITAL | Age: 72
End: 2022-08-11

## 2022-08-16 ENCOUNTER — APPOINTMENT (OUTPATIENT)
Dept: CARDIAC REHAB | Facility: HOSPITAL | Age: 72
End: 2022-08-16

## 2022-08-17 ENCOUNTER — HOSPITAL ENCOUNTER (OUTPATIENT)
Dept: CT IMAGING | Facility: HOSPITAL | Age: 72
Discharge: HOME | End: 2022-08-17
Admitting: NURSE PRACTITIONER

## 2022-08-17 DIAGNOSIS — R09.89 DIMINISHED PULSES IN LOWER EXTREMITY: ICD-10-CM

## 2022-08-17 DIAGNOSIS — I70.213 ATHEROSCLEROSIS OF NATIVE ARTERY OF BOTH LOWER EXTREMITIES WITH INTERMITTENT CLAUDICATION: ICD-10-CM

## 2022-08-17 LAB
CREAT BLDA-MCNC: 0.9 MG/DL
EGFRCR SERPLBLD CKD-EPI 2021: 67.6 ML/MIN/1.73

## 2022-08-17 PROCEDURE — 0 IOPAMIDOL PER 1 ML: Performed by: NURSE PRACTITIONER

## 2022-08-17 PROCEDURE — 75635 CT ANGIO ABDOMINAL ARTERIES: CPT

## 2022-08-17 PROCEDURE — 82565 ASSAY OF CREATININE: CPT

## 2022-08-17 RX ADMIN — IOPAMIDOL 50 ML: 755 INJECTION, SOLUTION INTRAVENOUS at 13:18

## 2022-08-17 RX ADMIN — IOPAMIDOL 100 ML: 755 INJECTION, SOLUTION INTRAVENOUS at 13:18

## 2022-08-18 ENCOUNTER — APPOINTMENT (OUTPATIENT)
Dept: CARDIAC REHAB | Facility: HOSPITAL | Age: 72
End: 2022-08-18

## 2022-08-23 ENCOUNTER — APPOINTMENT (OUTPATIENT)
Dept: CARDIAC REHAB | Facility: HOSPITAL | Age: 72
End: 2022-08-23

## 2022-08-24 ENCOUNTER — TELEPHONE (OUTPATIENT)
Dept: VASCULAR SURGERY | Facility: HOSPITAL | Age: 72
End: 2022-08-24

## 2022-08-24 ENCOUNTER — OFFICE VISIT (OUTPATIENT)
Dept: VASCULAR SURGERY | Facility: HOSPITAL | Age: 72
End: 2022-08-24

## 2022-08-24 VITALS
HEIGHT: 62 IN | BODY MASS INDEX: 33.49 KG/M2 | RESPIRATION RATE: 18 BRPM | HEART RATE: 76 BPM | OXYGEN SATURATION: 97 % | DIASTOLIC BLOOD PRESSURE: 60 MMHG | WEIGHT: 182 LBS | SYSTOLIC BLOOD PRESSURE: 122 MMHG | TEMPERATURE: 97.3 F

## 2022-08-24 DIAGNOSIS — I70.219 ATHEROSCLEROSIS OF LOWER EXTREMITY WITH CLAUDICATION: Primary | ICD-10-CM

## 2022-08-24 PROCEDURE — G0463 HOSPITAL OUTPT CLINIC VISIT: HCPCS | Performed by: SURGERY

## 2022-08-24 PROCEDURE — 99214 OFFICE O/P EST MOD 30 MIN: CPT | Performed by: SURGERY

## 2022-08-24 NOTE — PROGRESS NOTES
[facility]   Follow up Office    Patient Name: Gini Edmondson  : 1950  MRN: [id number]  Primary Care Physician:  Cheyenne Russell APRN      Subjective   Subjective     HPI:    Gini Edmondson is a 72 y.o. female with known peripheral vascular disease and lifestyle limiting intermittent claudication here for follow-up after a CTA.  Both legs are about the same.  She had an attempted procedure several years ago and they were not able to cross either occluded SFA.      Objective     Vitals:   Temp:  [97.3 °F (36.3 °C)] 97.3 °F (36.3 °C)  Heart Rate:  [76] 76  Resp:  [18] 18  BP: (122)/(60) 122/60    Physical Exam      General: Alert, no acute distress.  Extremities: Symmetric.    Diagnostic studies: A CTA dated 2022 demonstrates bilateral SFA occlusion.  There may be some bilateral external iliac stenosis.    Assessment & Plan   Assessment / Plan     Diagnoses and all orders for this visit:    1. Atherosclerosis of lower extremity with claudication (HCC) (Primary)  -     Case Request; Standing  -     Case Request    Other orders  -     Obtain Informed Consent; Future  -     Provide NPO Instructions to Patient; Future       Assessment/Plan:   Mrs. Edmondson is experiencing severe lifestyle limiting intermittent claudication of the bilateral lower extremities.  Plan angiography with possible endovascular intervention.  I have discussed with the patient in detail the mechanics of the procedure, the indications, benefits, risks, alternatives as well as potential complications to include but not limited to infection, bleeding, inability to cross the occlusion, vascular injury requiring surgical repair.  She appears to understand and desires to proceed.        Electronically signed by Jose Armando Mcbride MD, 22, 2:50 PM EDT.

## 2022-08-25 ENCOUNTER — APPOINTMENT (OUTPATIENT)
Dept: CARDIAC REHAB | Facility: HOSPITAL | Age: 72
End: 2022-08-25

## 2022-08-30 ENCOUNTER — APPOINTMENT (OUTPATIENT)
Dept: CARDIAC REHAB | Facility: HOSPITAL | Age: 72
End: 2022-08-30

## 2022-08-30 ENCOUNTER — APPOINTMENT (OUTPATIENT)
Dept: CARDIOLOGY | Facility: HOSPITAL | Age: 72
End: 2022-08-30

## 2022-09-01 ENCOUNTER — APPOINTMENT (OUTPATIENT)
Dept: CARDIAC REHAB | Facility: HOSPITAL | Age: 72
End: 2022-09-01

## 2022-09-14 PROCEDURE — S0260 H&P FOR SURGERY: HCPCS | Performed by: SURGERY

## 2022-09-14 NOTE — H&P
[facility]   HISTORY AND PHYSICAL    Patient Name: Gini Edmondson  : 1950  MRN: [id number]  Primary Care Physician:  Cheyenne Russell APRN  Date of admission: (Not on file)    Subjective   Subjective     Chief Complaint: Bilateral leg pain with ambulation    HPI:    Gini Edmondson is a 72 y.o. female with bilateral leg pain with ambulation    Review of Systems    Non contributory except for the History of Present Illness    Personal History     Past Medical History:   Diagnosis Date   • Allergy to sunlight     PER PT   • Cancer of left breast (HCC)     INVASVIE LOBULAR CARCINOMA   • Cardiac disorder     DR. MALCOLM ANTHONY   • Condition not found     RIGHT EYE CATARACT (LT CATARACT REPAIRED)   • Constipation    • Heart attack (HCC)     3-22-18 NON STEMI, CABG 3-23-18   • Hx of bronchitis     SMOKES 5-7 CIGERETTS PER DAY   • Hx of CABG     3V CABG    • Hypertension     MEDS CONTROL   • Neuropathy     IN FEETH FROM DM   • PAD (peripheral artery disease) (HCC)    • Raynaud's syndrome    • Status post left breast lumpectomy        Past Surgical History:   Procedure Laterality Date   • BREAST LUMPECTOMY      LEFT   • COLONOSCOPY     • CORONARY ARTERY BYPASS GRAFT     • LAPAROSCOPIC TUBAL LIGATION     • OTHER SURGICAL HISTORY      BILAT PAD LEGS   • OTHER SURGICAL HISTORY      CHIP IN LEFT BREAST, MARKER FOR SPOT   • OTHER SURGICAL HISTORY      LEFT KNEE REPAIR       Family History: family history includes Breast cancer in her maternal aunt, maternal aunt, and paternal aunt; Diabetes in her brother, father, and another family member; Hypertension in an other family member; Pancreatic cancer in her brother; Prostate cancer in her father; Stroke in her mother. Otherwise pertinent FHx was reviewed and not pertinent to current issue.    Social History:  reports that she has been smoking. She started smoking about 52 years ago. She has a 25.50 pack-year smoking history. She has never used smokeless  tobacco. She reports previous alcohol use. She reports that she does not use drugs.    Home Medications:  No current facility-administered medications on file prior to encounter.     Current Outpatient Medications on File Prior to Encounter   Medication Sig   • Accu-Chek FastClix Lancets misc Inject 1 Device under the skin into the appropriate area as directed 2 (Two) Times a Day. as directed   • Accu-Chek Guide test strip 1 each by Other route 3 (Three) Times a Day. as directed   • albuterol sulfate  (90 Base) MCG/ACT inhaler Inhale 1 puff 2 (Two) Times a Day.   • ASPIRIN 81 PO Take 1 tablet/day by mouth Daily.   • atorvastatin (LIPITOR) 20 MG tablet Take 1 tablet by mouth Daily.   • B-D ULTRAFINE III SHORT PEN 31G X 8 MM misc Inject 1 application under the skin into the appropriate area as directed 2 (Two) Times a Day.   • Biotin 1000 MCG tablet Take 1 tablet by mouth Daily.   • docusate sodium (COLACE) 100 MG capsule Take 1 tablet by mouth Take As Directed.   • escitalopram (LEXAPRO) 10 MG tablet Take 10 mg by mouth Daily.   • insulin NPH-insulin regular (humuLIN 70/30,novoLIN 70/30) (70-30) 100 UNIT/ML injection Inject 50 Units under the skin into the appropriate area as directed 2 (Two) Times a Day With Meals.   • letrozole (FEMARA) 2.5 MG tablet TAKE 1 TABLET BY MOUTH DAILY   • Magnesium 500 MG capsule Take 1 tablet by mouth As Needed.   • metoprolol tartrate (LOPRESSOR) 25 MG tablet Take 25 mg by mouth 2 (Two) Times a Day.   • nitroglycerin (NITROSTAT) 0.4 MG SL tablet Take 1 tablet by mouth Daily. USE AS DIRECTED   • Omega-3 Fatty Acids (fish oil) 1000 MG capsule capsule Take 1 tablet by mouth Daily.   • pentoxifylline (TRENtal) 400 MG CR tablet Take 1 tablet by mouth 2 (two) times a day.   • senna (SENOKOT) 8.6 MG tablet Take 1 tablet by mouth Daily.   • SITagliptin (JANUVIA) 100 MG tablet Take 1 tablet by mouth Daily.          Allergies:  Allergies   Allergen Reactions   • Cilostazol Shortness Of  Breath   • Sulfa Antibiotics Hives       Objective   Objective     Vitals:        Physical Exam   General: Alert, no acute distress.  Neck: Supple  Heart: Regular rate  Lungs: Clear  Abdomen: Benign  Extremities: Symmetric  Pulses: Nonpalpable bilateral pedal pulses    Diagnostic studies:   A CTA dated 8/17/2022 demonstrates bilateral SFA occlusion.  There may be some bilateral external iliac stenosis.    Assessment & Plan   Assessment / Plan     Active Hospital Problems:  Active Hospital Problems    Diagnosis    • **PAD (peripheral artery disease) ([facility])        Diagnoses and all orders for this visit:    1. Atherosclerosis of lower extremity with claudication (HCC)  -     Cardiac Catheterization/Vascular Study; Standing  -     Cardiac Catheterization/Vascular Study        Assessment/plan:   Mrs. Edmondson is experiencing severe lifestyle limiting intermittent claudication of the bilateral lower extremities.  Plan angiography with possible endovascular intervention.  I have discussed with the patient in detail the mechanics of the procedure, the indications, benefits, risks, alternatives as well as potential complications to include but not limited to infection, bleeding, inability to cross the occlusion, vascular injury requiring surgical repair.  She appears to understand and desires to proceed.      Electronically signed by Jose Armando Mcbride MD, 09/14/22, 4:44 PM EDT.

## 2022-09-15 ENCOUNTER — HOSPITAL ENCOUNTER (OUTPATIENT)
Facility: HOSPITAL | Age: 72
Setting detail: HOSPITAL OUTPATIENT SURGERY
Discharge: HOME | End: 2022-09-15
Attending: SURGERY | Admitting: SURGERY

## 2022-09-15 VITALS
RESPIRATION RATE: 16 BRPM | DIASTOLIC BLOOD PRESSURE: 66 MMHG | BODY MASS INDEX: 33.49 KG/M2 | TEMPERATURE: 98.6 F | WEIGHT: 182 LBS | HEART RATE: 82 BPM | OXYGEN SATURATION: 90 % | SYSTOLIC BLOOD PRESSURE: 138 MMHG | HEIGHT: 62 IN

## 2022-09-15 DIAGNOSIS — I70.219 ATHEROSCLEROSIS OF LOWER EXTREMITY WITH CLAUDICATION: ICD-10-CM

## 2022-09-15 LAB
ANION GAP SERPL CALCULATED.3IONS-SCNC: 10.2 MMOL/L (ref 5–15)
BASOPHILS # BLD AUTO: 0.05 10*3/MM3 (ref 0–0.2)
BASOPHILS NFR BLD AUTO: 0.5 % (ref 0–1.5)
BUN SERPL-MCNC: 8 MG/DL (ref 8–23)
BUN/CREAT SERPL: 9 (ref 7–25)
CALCIUM SPEC-SCNC: 9.3 MG/DL (ref 8.6–10.5)
CHLORIDE SERPL-SCNC: 103 MMOL/L (ref 98–107)
CO2 SERPL-SCNC: 29.8 MMOL/L (ref 22–29)
CREAT SERPL-MCNC: 0.89 MG/DL (ref 0.57–1)
DEPRECATED RDW RBC AUTO: 46.1 FL (ref 37–54)
EGFRCR SERPLBLD CKD-EPI 2021: 68.6 ML/MIN/1.73
EOSINOPHIL # BLD AUTO: 0.14 10*3/MM3 (ref 0–0.4)
EOSINOPHIL NFR BLD AUTO: 1.4 % (ref 0.3–6.2)
ERYTHROCYTE [DISTWIDTH] IN BLOOD BY AUTOMATED COUNT: 13.6 % (ref 12.3–15.4)
GLUCOSE SERPL-MCNC: 108 MG/DL (ref 65–99)
HCT VFR BLD AUTO: 46 % (ref 34–46.6)
HGB BLD-MCNC: 14.7 G/DL (ref 12–15.9)
IMM GRANULOCYTES # BLD AUTO: 0.02 10*3/MM3 (ref 0–0.05)
IMM GRANULOCYTES NFR BLD AUTO: 0.2 % (ref 0–0.5)
LYMPHOCYTES # BLD AUTO: 3.4 10*3/MM3 (ref 0.7–3.1)
LYMPHOCYTES NFR BLD AUTO: 33.4 % (ref 19.6–45.3)
MCH RBC QN AUTO: 29.3 PG (ref 26.6–33)
MCHC RBC AUTO-ENTMCNC: 32 G/DL (ref 31.5–35.7)
MCV RBC AUTO: 91.6 FL (ref 79–97)
MONOCYTES # BLD AUTO: 0.76 10*3/MM3 (ref 0.1–0.9)
MONOCYTES NFR BLD AUTO: 7.5 % (ref 5–12)
NEUTROPHILS NFR BLD AUTO: 5.82 10*3/MM3 (ref 1.7–7)
NEUTROPHILS NFR BLD AUTO: 57 % (ref 42.7–76)
NRBC BLD AUTO-RTO: 0 /100 WBC (ref 0–0.2)
PLATELET # BLD AUTO: 240 10*3/MM3 (ref 140–450)
PMV BLD AUTO: 10.2 FL (ref 6–12)
POTASSIUM SERPL-SCNC: 4.2 MMOL/L (ref 3.5–5.2)
RBC # BLD AUTO: 5.02 10*6/MM3 (ref 3.77–5.28)
SODIUM SERPL-SCNC: 143 MMOL/L (ref 136–145)
WBC NRBC COR # BLD: 10.19 10*3/MM3 (ref 3.4–10.8)

## 2022-09-15 PROCEDURE — 25010000002 MIDAZOLAM PER 1 MG: Performed by: SURGERY

## 2022-09-15 PROCEDURE — C1894 INTRO/SHEATH, NON-LASER: HCPCS | Performed by: SURGERY

## 2022-09-15 PROCEDURE — 85025 COMPLETE CBC W/AUTO DIFF WBC: CPT | Performed by: SURGERY

## 2022-09-15 PROCEDURE — C1760 CLOSURE DEV, VASC: HCPCS

## 2022-09-15 PROCEDURE — 99152 MOD SED SAME PHYS/QHP 5/>YRS: CPT | Performed by: SURGERY

## 2022-09-15 PROCEDURE — 25010000002 HEPARIN (PORCINE) PER 1000 UNITS: Performed by: SURGERY

## 2022-09-15 PROCEDURE — 80048 BASIC METABOLIC PNL TOTAL CA: CPT | Performed by: SURGERY

## 2022-09-15 PROCEDURE — C1887 CATHETER, GUIDING: HCPCS | Performed by: SURGERY

## 2022-09-15 PROCEDURE — 25010000002 CEFAZOLIN IN DEXTROSE 2-4 GM/100ML-% SOLUTION: Performed by: SURGERY

## 2022-09-15 PROCEDURE — 75625 CONTRAST EXAM ABDOMINL AORTA: CPT | Performed by: SURGERY

## 2022-09-15 PROCEDURE — C1769 GUIDE WIRE: HCPCS | Performed by: SURGERY

## 2022-09-15 PROCEDURE — 99153 MOD SED SAME PHYS/QHP EA: CPT | Performed by: SURGERY

## 2022-09-15 PROCEDURE — 75716 ARTERY X-RAYS ARMS/LEGS: CPT | Performed by: SURGERY

## 2022-09-15 PROCEDURE — 25010000002 FENTANYL CITRATE (PF) 100 MCG/2ML SOLUTION: Performed by: SURGERY

## 2022-09-15 PROCEDURE — 36246 INS CATH ABD/L-EXT ART 2ND: CPT | Performed by: SURGERY

## 2022-09-15 PROCEDURE — 0 IODIXANOL PER 1 ML: Performed by: SURGERY

## 2022-09-15 RX ORDER — MIDAZOLAM HYDROCHLORIDE 1 MG/ML
INJECTION INTRAMUSCULAR; INTRAVENOUS AS NEEDED
Status: DISCONTINUED | OUTPATIENT
Start: 2022-09-15 | End: 2022-09-15 | Stop reason: HOSPADM

## 2022-09-15 RX ORDER — IODIXANOL 320 MG/ML
INJECTION, SOLUTION INTRAVASCULAR AS NEEDED
Status: DISCONTINUED | OUTPATIENT
Start: 2022-09-15 | End: 2022-09-15 | Stop reason: HOSPADM

## 2022-09-15 RX ORDER — SODIUM CHLORIDE 9 MG/ML
100 INJECTION, SOLUTION INTRAVENOUS CONTINUOUS
Status: DISCONTINUED | OUTPATIENT
Start: 2022-09-15 | End: 2022-09-15 | Stop reason: HOSPADM

## 2022-09-15 RX ORDER — HEPARIN SODIUM 1000 [USP'U]/ML
INJECTION, SOLUTION INTRAVENOUS; SUBCUTANEOUS AS NEEDED
Status: DISCONTINUED | OUTPATIENT
Start: 2022-09-15 | End: 2022-09-15 | Stop reason: HOSPADM

## 2022-09-15 RX ORDER — CEFAZOLIN SODIUM 2 G/100ML
2 INJECTION, SOLUTION INTRAVENOUS ONCE
Status: COMPLETED | OUTPATIENT
Start: 2022-09-15 | End: 2022-09-15

## 2022-09-15 RX ORDER — LIDOCAINE HYDROCHLORIDE 20 MG/ML
INJECTION, SOLUTION INFILTRATION; PERINEURAL AS NEEDED
Status: DISCONTINUED | OUTPATIENT
Start: 2022-09-15 | End: 2022-09-15 | Stop reason: HOSPADM

## 2022-09-15 RX ORDER — FENTANYL CITRATE 50 UG/ML
INJECTION, SOLUTION INTRAMUSCULAR; INTRAVENOUS AS NEEDED
Status: DISCONTINUED | OUTPATIENT
Start: 2022-09-15 | End: 2022-09-15 | Stop reason: HOSPADM

## 2022-09-15 RX ADMIN — CEFAZOLIN SODIUM 2 G: 2 INJECTION, SOLUTION INTRAVENOUS at 09:54

## 2022-09-15 NOTE — PROGRESS NOTES
Angiogram performed.  The bilateral superficial femoral arteries are occluded and now identifiable stumps are present.  Attempts at accessing the left superficial femoral artery origin were unsuccessful.  No intervention was performed.    For details find full report under chart review, cardiology tab.

## 2022-09-23 ENCOUNTER — TELEPHONE (OUTPATIENT)
Dept: VASCULAR SURGERY | Facility: HOSPITAL | Age: 72
End: 2022-09-23

## 2022-09-23 NOTE — TELEPHONE ENCOUNTER
Caller: Gini Edmondson    Relationship: Self    Best call back number: [phone]    What is the best time to reach you: ANY     Who are you requesting to speak with (clinical staff, provider,  specific staff member): ANY     What was the call regarding: GINI SERAFIN WOULD LIKE TO KNOW IF SHE LEFT RECORDS OF HER COVID BOOSTER AT THE OFFICE OR IF THERE IS A COPY ON FILE.     Do you require a callback: YES PLEASE

## 2022-10-05 ENCOUNTER — OFFICE VISIT (OUTPATIENT)
Dept: VASCULAR SURGERY | Facility: HOSPITAL | Age: 72
End: 2022-10-05

## 2022-10-05 VITALS
OXYGEN SATURATION: 99 % | DIASTOLIC BLOOD PRESSURE: 80 MMHG | SYSTOLIC BLOOD PRESSURE: 110 MMHG | RESPIRATION RATE: 16 BRPM | HEART RATE: 90 BPM | TEMPERATURE: 97.8 F

## 2022-10-05 DIAGNOSIS — I70.219 ATHEROSCLEROSIS OF LOWER EXTREMITY WITH CLAUDICATION: Primary | ICD-10-CM

## 2022-10-05 PROCEDURE — 99214 OFFICE O/P EST MOD 30 MIN: CPT | Performed by: SURGERY

## 2022-10-05 PROCEDURE — G0463 HOSPITAL OUTPT CLINIC VISIT: HCPCS | Performed by: SURGERY

## 2022-10-05 NOTE — PROGRESS NOTES
[facility]   Follow up Office    Patient Name: Gini Edmondson  : 1950  MRN: [id number]  Primary Care Physician:  Cheyenne Russell APRN      Subjective   Subjective     HPI:    Gini Edmondson is a 72 y.o. female here for follow-up after angiography 9/15/2022.  She has bilateral lower extremity claudication and an angiogram was performed in an attempt to proceed with an endovascular intervention but this was not possible.  No new complaints at this time.      Objective     Vitals:   Temp:  [97.8 °F (36.6 °C)] 97.8 °F (36.6 °C)  Heart Rate:  [90] 90  Resp:  [16] 16  BP: (110)/(80) 110/80    Physical Exam      General: Alert, no acute distress.  Extremities: Symmetric.    Diagnostic studies: Angiography performed 9/15/2022 reviewed.  Bilateral SFA occlusion.  Moderate to severe left common femoral artery stenosis.    Assessment & Plan   Assessment / Plan     Diagnoses and all orders for this visit:    1. Atherosclerosis of lower extremity with claudication (HCC) (Primary)       Assessment/Plan:   Mrs. Edmondson has lifestyle limiting bilateral lower extremity claudication.  I have reviewed with her the management options today.  I discussed the alternatives of not doing anything versus proceeding with surgical revascularization.  I discussed in detail what to expect from surgical revascularization as well as benefits, risks, and complications.  At this time she will think about it and determine whether she actually feels that her symptoms are limiting enough that she wants to go through surgery.  If she decided to go through surgery she would probably want to wait until the beginning of .  If so she will call us and let us know.  Otherwise she will follow-up as needed.  She does see a cardiologist, Dr. Yonathan Torres.  He would be contacted to obtain clearance should she desire to proceed with surgery.        Electronically signed by Jose Armando Mcbride MD, 10/05/22, 1:53 PM EDT.

## 2022-11-09 ENCOUNTER — HOSPITAL ENCOUNTER (OUTPATIENT)
Dept: BONE DENSITY | Facility: HOSPITAL | Age: 72
Discharge: HOME | End: 2022-11-09
Admitting: INTERNAL MEDICINE

## 2022-11-09 DIAGNOSIS — Z78.0 POST-MENOPAUSAL: ICD-10-CM

## 2022-11-09 PROCEDURE — 77080 DXA BONE DENSITY AXIAL: CPT

## 2022-11-10 ENCOUNTER — OFFICE VISIT (OUTPATIENT)
Dept: ONCOLOGY | Facility: HOSPITAL | Age: 72
End: 2022-11-10

## 2022-11-10 VITALS
RESPIRATION RATE: 16 BRPM | HEART RATE: 85 BPM | TEMPERATURE: 97.5 F | BODY MASS INDEX: 33.31 KG/M2 | OXYGEN SATURATION: 90 % | DIASTOLIC BLOOD PRESSURE: 54 MMHG | SYSTOLIC BLOOD PRESSURE: 133 MMHG | WEIGHT: 182.1 LBS

## 2022-11-10 DIAGNOSIS — Z12.31 ENCOUNTER FOR SCREENING MAMMOGRAM FOR MALIGNANT NEOPLASM OF BREAST: ICD-10-CM

## 2022-11-10 DIAGNOSIS — Z17.0 MALIGNANT NEOPLASM OF LOWER-OUTER QUADRANT OF LEFT BREAST OF FEMALE, ESTROGEN RECEPTOR POSITIVE: Primary | ICD-10-CM

## 2022-11-10 DIAGNOSIS — C50.512 MALIGNANT NEOPLASM OF LOWER-OUTER QUADRANT OF LEFT BREAST OF FEMALE, ESTROGEN RECEPTOR POSITIVE: Primary | ICD-10-CM

## 2022-11-10 PROCEDURE — G0463 HOSPITAL OUTPT CLINIC VISIT: HCPCS | Performed by: NURSE PRACTITIONER

## 2022-11-10 PROCEDURE — 99214 OFFICE O/P EST MOD 30 MIN: CPT | Performed by: NURSE PRACTITIONER

## 2022-11-10 RX ORDER — INSULIN ASPART 100 [IU]/ML
54 INJECTION, SUSPENSION SUBCUTANEOUS
COMMUNITY
Start: 2022-10-27

## 2022-11-10 RX ORDER — ATORVASTATIN CALCIUM 10 MG/1
10 TABLET, FILM COATED ORAL DAILY
COMMUNITY
Start: 2022-10-27

## 2022-11-10 RX ORDER — LETROZOLE 2.5 MG/1
2.5 TABLET, FILM COATED ORAL DAILY
Qty: 90 TABLET | Refills: 1 | Status: SHIPPED | OUTPATIENT
Start: 2022-11-10 | End: 2023-05-09

## 2022-11-10 RX ORDER — PENTOXIFYLLINE 400 MG/1
400 TABLET, EXTENDED RELEASE ORAL 3 TIMES DAILY
COMMUNITY
Start: 2022-10-27

## 2022-11-10 RX ORDER — ASPIRIN 325 MG
325 TABLET ORAL DAILY
COMMUNITY

## 2022-11-10 NOTE — PROGRESS NOTES
Chief Complaint  Breast Cancer    Drew, Cheyenne Townsend, *  Drew, Cheyenne Townsend, APRN      Subjective          Gini Edmondson presents to [facility] for 6 month follow up for breast cancer.     History of Present Illness   Ms. Gini Edmondson presents for 6 month follow up breast cancer. Reports she is taking Letrozole. Offers no complaints associated with treatment other than weight gain. Compliant with daily med. Also taking Calcium and Vitamin D supplementation. Breast mammogram done in April and is benign. Completed bone density on 11/9/22 and is normal in LS and hips but decreased in the femoral necks since 2020.     Cancer Staging   Malignant neoplasm of lower-outer quadrant of left breast of female, estrogen receptor positive (HCC)  Staging form: Breast, AJCC 8th Edition  - Pathologic stage from 3/7/2020: Stage IA (pT1, pN0, cM0, G2, ER+, TX+, HER2-, Oncotype DX score: 30) - Signed by Chanel Almonte APRN on 7/7/2021  - Clinical: No stage assigned - Unsigned       Treatment intent: curative    Oncology/Hematology History Overview Note   Breast Cancer: Invasive Lobular Carcinoma, Left UOQ, 1.3 cm; Grade 2, ER: 100%, TX: 10%, HER2: 0, Ki-67: 30%; Oncotype DX: 30    Treatment History:  - s/p LEFT lumpectomy and SLND (3/27/20)  - s/p re-excision (+) margin (1 mm residual dx found) (4/7/20)  - completed adjuvant XRT L breast: 4256 cGy (5/14-6/5/20)  - Started Femara May 2020  - DEXA August 2020-normal bone density     Malignant neoplasm of lower-outer quadrant of left breast of female, estrogen receptor positive (HCC)   2/20/2020 Surgery    Surgery       Procedure:  Ultrasound guided core needle biopsy      Location:  1:00 o'clock position      Invasive lobular carcinoma  ER/TX positive   Her 2 ted negative  Ki-67 30%     3/7/2020 Cancer Staged    Staging form: Breast, AJCC 8th Edition  - Pathologic stage from 3/7/2020: Stage IA (pT1, pN0, cM0, G2, ER+, TX+,  HER2-, Oncotype DX score: 30) - Signed by Chanel Almonte APRN on 7/7/2021     3/27/2020 Initial Diagnosis    Malignant neoplasm of left breast in female, estrogen receptor positive (CMS/HCC)     3/27/2020 Surgery    Surgery       Procedure:  Left breast lumpectomy with SLNB          Invasive lobular carcinoma, 13 mm, ER/OK positive, her 2 ted negative, 2/2 SLN negative, positive superior medial margin       5/14/2020 - 6/5/2020 Radiation    Radiation OncologyTreatment Course:  Gini Edmondson received 4256 cGy in 16 fractions to left breast     7/7/2020 Surgery    Surgery       Procedure:  Reexcision of positive superior margin       Pathology revealed ILC single minute focus with negative margins.          Review of Systems   Constitutional: Positive for fatigue (Extreme fatigue ).   HENT: Negative.    Eyes: Negative.    Respiratory: Positive for cough.    Cardiovascular: Negative.    Gastrointestinal: Negative.    Endocrine: Negative.    Genitourinary: Negative.    Musculoskeletal: Negative.    Allergic/Immunologic: Negative.    Neurological: Negative.    Hematological: Negative.    Psychiatric/Behavioral: Negative.    All other systems reviewed and are negative.      Current Outpatient Medications on File Prior to Visit   Medication Sig Dispense Refill   • Accu-Chek FastClix Lancets misc Inject 1 Device under the skin into the appropriate area as directed 2 (Two) Times a Day. as directed     • Accu-Chek Guide test strip 1 each by Other route 3 (Three) Times a Day. as directed     • albuterol sulfate  (90 Base) MCG/ACT inhaler Inhale 1 puff 2 (Two) Times a Day.     • aspirin 325 MG tablet Take 1 tablet by mouth Daily.     • ASPIRIN 81 PO Take 1 tablet/day by mouth Daily.     • atorvastatin (LIPITOR) 10 MG tablet Take 1 tablet by mouth Daily.     • atorvastatin (LIPITOR) 20 MG tablet Take 1 tablet by mouth Daily.     • B-D ULTRAFINE III SHORT PEN 31G X 8 MM misc Inject 1 application under the skin  into the appropriate area as directed 2 (Two) Times a Day.     • Biotin 1000 MCG tablet Take 1 tablet by mouth Daily.     • docusate sodium (COLACE) 100 MG capsule Take 1 tablet by mouth Take As Directed.     • empagliflozin (JARDIANCE) 25 MG tablet tablet Take 1 tablet by mouth Daily.     • escitalopram (LEXAPRO) 10 MG tablet Take 10 mg by mouth Daily.     • insulin aspart prot & aspart (NovoLOG Mix 70/30 FlexPen) (70-30) 100 UNIT/ML suspension pen-injector injection Inject 0.54 mL under the skin into the appropriate area as directed.     • insulin NPH-insulin regular (humuLIN 70/30,novoLIN 70/30) (70-30) 100 UNIT/ML injection Inject 50 Units under the skin into the appropriate area as directed 2 (Two) Times a Day With Meals.     • Magnesium 500 MG capsule Take 1 tablet by mouth As Needed.     • metoprolol tartrate (LOPRESSOR) 25 MG tablet Take 25 mg by mouth 2 (Two) Times a Day.     • Omega-3 Fatty Acids (fish oil) 1000 MG capsule capsule Take 1 tablet by mouth Daily.     • pentoxifylline (TRENtal) 400 MG CR tablet Take 1 tablet by mouth 2 (two) times a day.     • pentoxifylline (TRENtal) 400 MG CR tablet Take 1 tablet by mouth 3 (Three) Times a Day.     • senna (SENOKOT) 8.6 MG tablet Take 1 tablet by mouth Daily.     • SITagliptin (JANUVIA) 100 MG tablet Take 1 tablet by mouth Daily.       No current facility-administered medications on file prior to visit.       Allergies   Allergen Reactions   • Cilostazol Shortness Of Breath   • Sulfa Antibiotics Hives     Past Medical History:   Diagnosis Date   • Allergy to sunlight     PER PT   • Cancer of left breast (HCC)     INVASVIE LOBULAR CARCINOMA   • Cardiac disorder     DR. MALCOLM ANTHONY   • Condition not found     RIGHT EYE CATARACT (LT CATARACT REPAIRED)   • Constipation    • Heart attack (HCC)     3-22-18 NON STEMI, CABG 3-23-18   • Hx of bronchitis     SMOKES 5-7 CIGERETTS PER DAY   • Hx of CABG     3V CABG 2018   • Hypertension     MEDS CONTROL   • Neuropathy      IN FEETH FROM DM   • PAD (peripheral artery disease) (HCC)    • Raynaud's syndrome    • Status post left breast lumpectomy      Past Surgical History:   Procedure Laterality Date   • BREAST LUMPECTOMY      LEFT   • CARDIAC CATHETERIZATION N/A 9/15/2022    Procedure: Aortogram with bilateral leg angiogram, possible angioplasty or stenting;  Surgeon: Jose Armando Mcbride MD;  Location: [facility] INVASIVE LOCATION;  Service: Vascular;  Laterality: N/A;   • COLONOSCOPY     • CORONARY ARTERY BYPASS GRAFT     • LAPAROSCOPIC TUBAL LIGATION     • OTHER SURGICAL HISTORY      BILAT PAD LEGS   • OTHER SURGICAL HISTORY      CHIP IN LEFT BREAST, MARKER FOR SPOT   • OTHER SURGICAL HISTORY      LEFT KNEE REPAIR     Social History     Socioeconomic History   • Marital status:    Tobacco Use   • Smoking status: Every Day     Packs/day: 0.50     Years: 51.00     Pack years: 25.50     Types: Cigarettes     Start date: 7/7/1970   • Smokeless tobacco: Never   • Tobacco comments:     7-10 CIGARETTES   Vaping Use   • Vaping Use: Never used   Substance and Sexual Activity   • Alcohol use: Not Currently   • Drug use: Never   • Sexual activity: Defer     Family History   Problem Relation Age of Onset   • Prostate cancer Father    • Diabetes Father    • Pancreatic cancer Brother    • Diabetes Brother    • Breast cancer Maternal Aunt    • Breast cancer Maternal Aunt    • Stroke Mother    • Breast cancer Paternal Aunt    • Diabetes Other    • Hypertension Other      Immunization History   Administered Date(s) Administered   • COVID-19 (PFIZER) BIVALENT BOOSTER 12+YRS 09/28/2022   • COVID-19 (PFIZER) PURPLE CAP 04/10/2021, 05/01/2021, 11/22/2021   • Fluzone High Dose =>65 Years ([employer] ONLY) 09/19/2020, 09/19/2020   • Fluzone High-Dose 65+yrs 09/19/2020, 09/28/2022   • Pneumococcal Polysaccharide (PPSV23) 10/20/2020       Objective   Physical Exam    Vitals:    11/10/22 1105   BP: 133/54   Pulse: 85   Resp: 16   Temp: 97.5 °F (36.4 °C)    SpO2: 90%   Weight: 82.6 kg (182 lb 1.6 oz)   PainSc: 0-No pain     ECOG score: 0         ECOG: (0) Fully Active - Able to Carry On All Pre-disease Performance Without Restriction  Fall Risk Assessment was completed, and patient is at low risk for falls.  PHQ-9 Total Score:         The patient is  experiencing fatigue. Fatigue score: 10    PT/OT Functional Screening: PT fx screen: No needs identified  Speech Functional Screening: Speech fx screen: No needs identified  Rehab to be ordered: Rehab to be ordered: No needs identified        Result Review :   The following data was reviewed by: JACY Nunez on 11/10/2022:  Lab Results   Component Value Date    HGB 14.7 09/15/2022    HCT 46.0 09/15/2022    MCV 91.6 09/15/2022     09/15/2022    WBC 10.19 09/15/2022    NEUTROABS 5.82 09/15/2022    LYMPHSABS 3.40 (H) 09/15/2022    MONOSABS 0.76 09/15/2022    EOSABS 0.14 09/15/2022    BASOSABS 0.05 09/15/2022     Lab Results   Component Value Date    GLUCOSE 108 (H) 09/15/2022    BUN 8 09/15/2022    CREATININE 0.89 09/15/2022     09/15/2022    K 4.2 09/15/2022     09/15/2022    CO2 29.8 (H) 09/15/2022    CALCIUM 9.3 09/15/2022    PROTEINTOT 7.0 04/20/2020    ALBUMIN 4.3 04/20/2020    BILITOT 0.2 04/20/2020    ALKPHOS 60 04/20/2020    AST 30 04/20/2020    ALT 20 04/20/2020          Assessment and Plan    Diagnoses and all orders for this visit:    1. Malignant neoplasm of lower-outer quadrant of left breast of female, estrogen receptor positive (HCC) (Primary)  -     letrozole (FEMARA) 2.5 MG tablet; Take 1 tablet by mouth Daily for 180 days.  Dispense: 90 tablet; Refill: 1  -     Ambulatory Referral to Occupational Therapy    2. Encounter for screening mammogram for malignant neoplasm of breast  -     Mammo Screening Digital Tomosynthesis Bilateral With CAD; Future      She is doing well in the interim.Tolerating Letrozole well. Reviewed bone density results with patient. She is to continue  Calcium and Vitamin D supplementation. Encourage exercise and weight loss.     Mammogram scheduled for April 2023 and follow up with Dr. Dawkins after mammogram is completed. Refills sent for Letrozole.         Patient Follow Up: 6 months with Dr. Dawkins.     Patient was given instructions and counseling regarding her condition or for health maintenance advice. Please see specific information pulled into the AVS if appropriate.     Rupinder Sol, APRN    11/10/2022

## 2022-12-01 ENCOUNTER — HOSPITAL ENCOUNTER (OUTPATIENT)
Dept: OCCUPATIONAL THERAPY | Facility: HOSPITAL | Age: 72
Setting detail: THERAPIES SERIES
Discharge: HOME | End: 2022-12-01

## 2022-12-01 DIAGNOSIS — C50.512 MALIGNANT NEOPLASM OF LOWER-OUTER QUADRANT OF LEFT BREAST OF FEMALE, ESTROGEN RECEPTOR POSITIVE: Primary | ICD-10-CM

## 2022-12-01 DIAGNOSIS — Z17.0 MALIGNANT NEOPLASM OF LOWER-OUTER QUADRANT OF LEFT BREAST OF FEMALE, ESTROGEN RECEPTOR POSITIVE: Primary | ICD-10-CM

## 2022-12-01 DIAGNOSIS — Z91.89 AT RISK FOR LYMPHEDEMA: ICD-10-CM

## 2022-12-01 PROCEDURE — 93702 BIS XTRACELL FLUID ANALYSIS: CPT

## 2022-12-01 PROCEDURE — 97165 OT EVAL LOW COMPLEX 30 MIN: CPT

## 2022-12-01 NOTE — THERAPY EVALUATION
Outpatient Occupational Therapy Lymphedema Re-Evaluation   Win     Patient Name: Gini Edmondson  : 1950  MRN: [id number]  Today's Date: 2022      Visit Date: 2022    Patient Active Problem List   Diagnosis   • PAD (peripheral artery disease) (HCC)   • Asthma   • Chronic obstructive pulmonary disease (HCC)   • Depression   • Diabetes mellitus, type II (HCC)   • Hypertension, essential   • Goiter   • Raynaud's syndrome   • Malignant neoplasm of lower-outer quadrant of left breast of female, estrogen receptor positive (HCC)        Past Medical History:   Diagnosis Date   • Allergy to sunlight     PER PT   • Cancer of left breast (HCC)     INVASVIE LOBULAR CARCINOMA   • Cardiac disorder     DR. MALCOLM ANTHONY   • Condition not found     RIGHT EYE CATARACT (LT CATARACT REPAIRED)   • Constipation    • Heart attack (HCC)     3-22-18 NON STEMI, CABG 3-23-18   • Hx of bronchitis     SMOKES 5-7 CIGERETTS PER DAY   • Hx of CABG     3V CABG    • Hypertension     MEDS CONTROL   • Neuropathy     IN FEETH FROM DM   • PAD (peripheral artery disease) (HCC)    • Raynaud's syndrome    • Status post left breast lumpectomy         Past Surgical History:   Procedure Laterality Date   • BREAST LUMPECTOMY      LEFT   • CARDIAC CATHETERIZATION N/A 9/15/2022    Procedure: Aortogram with bilateral leg angiogram, possible angioplasty or stenting;  Surgeon: Jose Armando Mcbride MD;  Location: [facility] INVASIVE LOCATION;  Service: Vascular;  Laterality: N/A;   • COLONOSCOPY     • CORONARY ARTERY BYPASS GRAFT     • LAPAROSCOPIC TUBAL LIGATION     • OTHER SURGICAL HISTORY      BILAT PAD LEGS   • OTHER SURGICAL HISTORY      CHIP IN LEFT BREAST, MARKER FOR SPOT   • OTHER SURGICAL HISTORY      LEFT KNEE REPAIR         Visit Dx:     ICD-10-CM ICD-9-CM   1. Malignant neoplasm of lower-outer quadrant of left breast of female, estrogen receptor positive (HCC)  C50.512 174.5    Z17.0 V86.0        Patient History     Row Name  12/01/22 1100             History    Brief Description of Current Complaint Patient had lumpectomy on left breast in April of 2020. Patient is currently taking Letrazole.  -SF      Hand Dominance right-handed  -SF         Fall Risk Assessment    Any falls in the past year: Yes  -SF      Number of falls reported in the last 12 months 1  -SF      Factors that contributed to the fall: Tripped  -SF      Does patient have a fear of falling No  -SF         Services    Prior Rehab/Home Health Experiences No  -SF      Are you currently receiving Home Health services No  -SF      Do you plan to receive Home Health services in the near future No  -SF         Daily Activities    Primary Language English  -SF      How does patient learn best? Listening;Reading;Demonstration;Pictures/Video  -SF         Safety    Are you being hurt, hit, or frightened by anyone at home or in your life? No  -SF      Are you being neglected by a caregiver No  -SF      Have you had any of the following issues with N/A  -SF            User Key  (r) = Recorded By, (t) = Taken By, (c) = Cosigned By    Initials Name Provider Type    SF Tonya Barragan OT Occupational Therapist                 Lymphedema     Row Name 12/01/22 1100             Subjective Pain    Able to rate subjective pain? yes  -SF      Pre-Treatment Pain Level 0  -SF      Post-Treatment Pain Level 0  -SF      Subjective Pain Comment Patient denies pain  -SF         Subjective Comments    Subjective Comments Patient is interested in learning more about different bra options. Patient reports no concern w/ swelling or tightness. Patient reports she was seen at lymphedema clinic before lumpectomy but due to Covid was not set up for follow up appointment.  -SF         Lymphedema Assessment    Lymphedema Classification LUE:;at risk/stage 0  -SF      Lymphedema Cancer Related Sx left;lumpectomy;axillary dissection  -SF      Lymph Nodes Removed # 0  -SF      Cancer Comments ER+ HER-  -SF       "Chemo Received yes  -SF      Chemo Treatments #/Timeframe Letrazole  -SF      Radiation Therapy Received yes  -SF      Radiation Treatments #/Timeframe 16  -SF         LLIS - Physical Concerns    The amount of pain associated with my lymphedema is: 0  -SF      The amount of limb heaviness associated with my lymphedema is: 0  -SF      The amount of skin tightness associated with my lymphedema is: 0  -SF      The size of my swollen limb(s) seems: 0  -SF      Lymphedema affects the movement of my swollen limb(s): 0  -SF      The strength in my swollen limb(s) is: 0  -SF         LLIS - Psychosocial Concerns    Lymphedema affects my body image (i.e., \"how I think I look\"). 0  -SF      Lymphedema affects my socializing with others. 0  -SF      Lymphedema affects my intimate relations with spouse or partner (rate 0 if not applicable 0  -SF      Lymphedema \"gets me down\" (i.e., depression, frustration, or anger) 0  -SF      I must rely on others for help due to my lymphedema. 0  -SF      I know what to do to manage my lymphedema 4  -SF         LLIS - Functional Concerns    Lymphedema affects my ability to perform self-care activities (i.e. eating, dressing, hygiene) 0  -SF      Lymphedema affects my ability to perform routine home or work-related activities. 0  -SF      Lymphedema affects my performance of preferred leisure activities. 0  -SF      Lymphedema affects proper fit of clothing/shoes 0  -SF      Lymphedema affects my sleep 0  -SF         Posture/Observations    Posture- WNL Posture is WNL  -SF         General ROM    GENERAL ROM COMMENTS BUE WFL  -SF         MMT (Manual Muscle Testing)    General MMT Comments BUE WFL  -SF         Skin Changes/Observations    Location/Assessment Upper Extremity  -SF      Upper Extremity Conditions left:;normal;intact;clean  -SF      Upper Extremity Color/Pigment left:;normal  -SF         L-Dex Bioimpedence Screening    L-Dex Measurement Extremity LUE  -SF      L-Dex Patient " Position Standing  -SF      L-Dex UE Dominate Side Right  -SF      L-Dex UE At Risk Side Left  -SF      L-Dex UE Pre Surgical Value No  -SF      L-Dex UE Score 7.5  -SF      L-Dex UE Baseline Score 7.5  -SF      L-Dex UE Value Change 0  -SF      $ L-Dex Charge yes  -SF         Lymphedema Life Impact Scale Totals    A.  Total Q1 - Q17 (Do not include Q18) 4  -SF      B.  Total number of questions answered (Q1-Q17) 17  -SF      C. Divide A by B 0.24  -SF      D. Multiple C by 25 6  -SF            User Key  (r) = Recorded By, (t) = Taken By, (c) = Cosigned By    Initials Name Provider Type    Tonya Madden OT Occupational Therapist                        Therapy Education  Education Details: Patient provided education on risk factors for lymphedema to include greater than 6 lymph node removal, BMI greater than 30, mastectomy versus lumpectomy, radiation therapy, and Taxol use and advanced age. Patient provided with the LeAP lymphedema action plan stoplight to recovery handout (Rehabilitation Oncology: July 2019 - Volume 37 - Issue 3 - p 122127 doi: 10.1097/01.REO.[id number]) to improve patient's ability to identify lymph exacerbation and provide guidance on appropriate action to be taken to control symptoms. Patient provided with education on a simple self-manual lymphatic drainage technique.  Handouts provided.   Patient exhibited fair return demonstration of skill.  Patient will benefit from continued education to ensure carryover skill. Pt educated on signs and symptoms of infection. Therapist urging pt to seek doctor evaluation if signs and symptoms occur and to remove any compression. Therapist educating pt regarding pt must be on antibiotic for 72 hours if there is an infection prior to continuing compression to prevent the spread of infection. Therapist providing pt with new HEP to maintain ROM and prevent tightness. Therapist providing written and visual demonstration of technique with safety cues to  prevent injury. Pt was able to demonstrate fair return in skill. Pt would benefit from continued review of exercises to ensure proper technique. Therapist educated patient on proper compression bra fitting and encouraged patient to obtain a bra that has more support in L axillary space.  Given: HEP, Symptoms/condition management  Program: New, Reinforced  How Provided: Verbal, Demonstration, Written  Provided to: Patient  Level of Understanding: Verbalized, Demonstrated     The patient had a 7.5 SOZO measurement which I reviewed today. Bioimpedance spectroscopy helps identify the onset of lymphedema in an arm or leg before patients experience noticeable swelling. Research has shown that the early detection of lymphedema using L-Dex combined with treatment can reduce progression to chronic lymphedema by 95% in breast cancer patients. Whenever possible, patients are tested for baseline L-Dex score before cancer treatment begins and then are reassessed during regular follow-up visits using the SOZO device. Otherwise, this can be started postoperatively and continued during regular follow-up visits. If the patient's L-Dex score increases above normal levels, that is a sign that lymphedema is developing and a referral is made to physical therapy for further evaluation and early compression treatment. Lymphedema assessment with the SOZO L-Dex score is recommended to be done every 3 months for the first 3 years and then every 6 months for years 4 and 5 followed by annually afterwards.       OT Goals     Row Name 12/01/22 7113          Time Calculation    OT Goal Re-Cert Due Date 12/31/22  -SF           User Key  (r) = Recorded By, (t) = Taken By, (c) = Cosigned By    Initials Name Provider Type    SF Tonya Barragan OT Occupational Therapist                 OT Assessment/Plan     Row Name 12/01/22 1317          OT Assessment    Functional Limitations Other (comment)  At risk for lymphedema  -SF     Impairments Impaired  lymphatic circulation;Balance  -SF     Assessment Comments Patient is a 72-year-old female referred to lymphedema clinic for evaluation due to previous breast cancer diagnosis and lumpectomy.  Patient currently reports no symptoms associated with the lymphedema exacerbation and is 2 years post surgery.  Patient reports no pain and demonstrates full active range of motion with no limitations in daily functional activities.  Patient does report swelling at times and left axillary space and was provided with education on this date regarding compression sports problems. Patient will benefit from continued skilled occupational therapy services to evaluate ongoing lymphatic functioning to prevent further advancement in lymphedema staging.  -SF     OT Diagnosis At risk for lymphedema  -SF     OT Rehab Potential Good  -SF     Patient/caregiver participated in establishment of treatment plan and goals Yes  -SF     Patient would benefit from skilled therapy intervention Yes  -SF        OT Plan    OT Frequency Other (comment)  -SF     Predicted Duration of Therapy Intervention (OT) Patient will be reassessed every 3 months years 1 through 3, and every 6 months years 4 and 5.  -SF     Planned CPT's? OT EVAL LOW COMPLEXITY: 10036;OT EVAL MOD COMPLEXITY: 69179;OT EVAL HIGH COMPLEXITY: 77936;OT THER ACT EA 15 MIN: [id number];OT THER PROC EA 15 MIN: [id number];OT MANUAL THERAPY EA 15 MIN: 50129;OT ORTHOTIC MGMT/TRAIN EA 15 MIN: 28531;OT ORTHO/PROSTHET CHECKOUT EA 15 MIN: 48766  -SF     Planned Therapy Interventions (Optional Details) home exercise program;manual therapy techniques;strengthening;stretching;prosthetic fitting/training;patient/family education;orthotic fitting/training;ROM (Range of Motion)  -SF     OT Plan Comments Initiate POC  -SF           User Key  (r) = Recorded By, (t) = Taken By, (c) = Cosigned By    Initials Name Provider Type    SF Tonya Barragan OT Occupational Therapist               Goals:  1. Post Breast Surgery  Care/at risk for Lymphedema  LTG 1: 90 days:  As an indicator of no exacerbation of lymphedema staging, the patient will present with an L-Dex score less than [10] points from preoperative baseline.   STATUS: New  STG 1a:   30 days: To prevent exacerbation of mixed edema to lymphedema, patient will utilize the 2 postsurgical compression garments daily.      STATUS: New  STG 1b: 30 days: Patient will be independent with self-manual lymphatic massage.    STATUS: New  STG 1c: 30 days:  Patient will be independent with identification of signs and symptoms of lymphedema exasperation per stoplight to recovery education handout.   STATUS: New  STG 1 d: 30 days: Patient will be independent with HEP to prevent advancement in lymphedema staging.   STATUS: New  TREATMENT:  Self Care/ADL retraining, Therapeutic Activity, Neuromuscular Re-education, Therapeutic Exercise, Bioimpedence Fluid Analysis, Post-Surgical compression garement [address]-High/ Violeta Camisole Kit 2860K, Orthotic Management and training,  and Manual Therapy.      OT eval complexity:    Examination:    Performance Deficits include:   Health Management,   # deficits affecting performance:   1   Decision Making:       Treatment Options Considered : Health Promotion, Prevention, Remediation/Restoration   # Treatment options considered : Several (3)    Modification Made for:  None   Level of Task Modification: None    Comorbidity Affect Performance: None    How is performance affected NA   Evaluation Level Determined:   Low          Time Calculation:   Untimed Charges  OT Eval/Re-eval Minutes: 90  Total Minutes  Untimed Charges Total Minutes: 90   Total Minutes: 90     Therapy Charges for Today     Code Description Service Date Service Provider Modifiers Qty    81825623322 HC PT BIS XTRACELL FLUID ANALYSIS 12/1/2022 Tonya Barragan OT  1    51444089801 HC OT EVAL LOW COMPLEXITY 4 12/1/2022 Tonya Barragan OT GO 1                    Tonya Barragan  OT  12/1/2022

## 2023-01-10 ENCOUNTER — TRANSCRIBE ORDERS (OUTPATIENT)
Dept: ADMINISTRATIVE | Facility: HOSPITAL | Age: 73
End: 2023-01-10
Payer: MEDICARE

## 2023-01-10 DIAGNOSIS — E04.1: Primary | ICD-10-CM

## 2023-01-23 ENCOUNTER — TRANSCRIBE ORDERS (OUTPATIENT)
Dept: ADMINISTRATIVE | Facility: HOSPITAL | Age: 73
End: 2023-01-23
Payer: MEDICARE

## 2023-01-23 DIAGNOSIS — E04.1 THYROID NODULE: ICD-10-CM

## 2023-01-23 DIAGNOSIS — R93.89 ABNORMAL THYROID ULTRASOUND: ICD-10-CM

## 2023-01-23 DIAGNOSIS — E04.9 THYROID GOITER: Primary | ICD-10-CM

## 2023-02-20 ENCOUNTER — HOSPITAL ENCOUNTER (OUTPATIENT)
Dept: ULTRASOUND IMAGING | Facility: HOSPITAL | Age: 73
Discharge: HOME | End: 2023-02-20
Admitting: NURSE PRACTITIONER
Payer: MEDICARE

## 2023-02-20 DIAGNOSIS — E04.1 THYROID NODULE: ICD-10-CM

## 2023-02-20 DIAGNOSIS — R93.89 ABNORMAL THYROID ULTRASOUND: ICD-10-CM

## 2023-02-20 DIAGNOSIS — E04.9 THYROID GOITER: ICD-10-CM

## 2023-02-20 PROCEDURE — 76536 US EXAM OF HEAD AND NECK: CPT

## 2023-05-17 DIAGNOSIS — C50.512 MALIGNANT NEOPLASM OF LOWER-OUTER QUADRANT OF LEFT BREAST OF FEMALE, ESTROGEN RECEPTOR POSITIVE: ICD-10-CM

## 2023-05-17 DIAGNOSIS — Z17.0 MALIGNANT NEOPLASM OF LOWER-OUTER QUADRANT OF LEFT BREAST OF FEMALE, ESTROGEN RECEPTOR POSITIVE: ICD-10-CM

## 2023-05-17 RX ORDER — LETROZOLE 2.5 MG/1
TABLET, FILM COATED ORAL
Qty: 30 TABLET | Refills: 1 | Status: SHIPPED | OUTPATIENT
Start: 2023-05-17 | End: 2023-05-19

## 2023-05-19 RX ORDER — LETROZOLE 2.5 MG/1
2.5 TABLET, FILM COATED ORAL DAILY
Qty: 30 TABLET | Refills: 3 | Status: SHIPPED | OUTPATIENT
Start: 2023-05-19

## 2023-05-23 ENCOUNTER — HOSPITAL ENCOUNTER (OUTPATIENT)
Dept: MAMMOGRAPHY | Facility: HOSPITAL | Age: 73
Discharge: HOME | End: 2023-05-23
Admitting: NURSE PRACTITIONER
Payer: MEDICARE

## 2023-05-23 DIAGNOSIS — Z12.31 ENCOUNTER FOR SCREENING MAMMOGRAM FOR MALIGNANT NEOPLASM OF BREAST: ICD-10-CM

## 2023-05-23 PROCEDURE — 77063 BREAST TOMOSYNTHESIS BI: CPT

## 2023-05-23 PROCEDURE — 77067 SCR MAMMO BI INCL CAD: CPT

## 2023-06-09 ENCOUNTER — TELEPHONE (OUTPATIENT)
Dept: OCCUPATIONAL THERAPY | Facility: HOSPITAL | Age: 73
End: 2023-06-09
Payer: MEDICARE

## 2023-06-09 NOTE — TELEPHONE ENCOUNTER
Therapist has made multiple attempts to contact patient to schedule evaluation with no return phone call

## 2023-12-27 ENCOUNTER — OFFICE VISIT (OUTPATIENT)
Dept: ONCOLOGY | Facility: HOSPITAL | Age: 73
End: 2023-12-27
Payer: MEDICARE

## 2023-12-27 VITALS
RESPIRATION RATE: 16 BRPM | DIASTOLIC BLOOD PRESSURE: 82 MMHG | TEMPERATURE: 97.5 F | BODY MASS INDEX: 33.92 KG/M2 | OXYGEN SATURATION: 97 % | HEIGHT: 62 IN | SYSTOLIC BLOOD PRESSURE: 140 MMHG | HEART RATE: 77 BPM | WEIGHT: 184.3 LBS

## 2023-12-27 DIAGNOSIS — C50.512 MALIGNANT NEOPLASM OF LOWER-OUTER QUADRANT OF LEFT BREAST OF FEMALE, ESTROGEN RECEPTOR POSITIVE: Primary | ICD-10-CM

## 2023-12-27 DIAGNOSIS — Z12.31 ENCOUNTER FOR SCREENING MAMMOGRAM FOR MALIGNANT NEOPLASM OF BREAST: ICD-10-CM

## 2023-12-27 DIAGNOSIS — Z17.0 MALIGNANT NEOPLASM OF LOWER-OUTER QUADRANT OF LEFT BREAST OF FEMALE, ESTROGEN RECEPTOR POSITIVE: Primary | ICD-10-CM

## 2023-12-27 DIAGNOSIS — Z78.0 POST-MENOPAUSAL: ICD-10-CM

## 2023-12-27 PROCEDURE — G0463 HOSPITAL OUTPT CLINIC VISIT: HCPCS | Performed by: INTERNAL MEDICINE

## 2023-12-27 RX ORDER — LETROZOLE 2.5 MG/1
2.5 TABLET, FILM COATED ORAL DAILY
Qty: 90 TABLET | Refills: 1 | Status: SHIPPED | OUTPATIENT
Start: 2023-12-27

## 2023-12-27 NOTE — PROGRESS NOTES
Chief Complaint/Care Team   Malignant neoplasm of lower-outer quadrant of left breast o    Cheyenne Russell, *  Cheyenne Russell, APRN    History of Present Illness     Diagnosis: Left Breast Cancer: Invasive Lobular Carcinoma, Left UOQ, 1.3 cm; Grade 2, ER: 100%, WV: 10%, HER2: 0, Ki-67: 30%; Oncotype DX: 30    Current Treatment: Letrozole 2.5 mg PO QD  Previous Treatment:     Treatment History:  - s/p LEFT lumpectomy and SLND (3/27/20)  - s/p re-excision (+) margin (1 mm residual dx found) (4/7/20)  - completed adjuvant XRT L breast: 4256 cGy (5/14-6/5/20)  - Started Femara May 2020  - DEXA August 2020-normal bone density  - DEXA 11/2022 normal density in lumbar spine and decrease density of femoral neck.  -Patient previously under the care Dr. Dawkins, transitioned care to Dr. Moore on 12/27/2023.    Gini Edmondson is a 73 y.o. female who presents to [facility]EMAT[facility] ONCOL[facility] for follow-up regarding left breast cancer, patient can resume treatment with letrozole.  Patient reports hot flashes are manageable.  Patient any new breast concerns or adenopathy.  Patient reports compliance letrozole and denies missing any doses.     Review of Systems   Constitutional:  Positive for fatigue.   Genitourinary:  Positive for breast pain (Random pinch feeling).        Oncology/Hematology History Overview Note   Breast Cancer: Invasive Lobular Carcinoma, Left UOQ, 1.3 cm; Grade 2, ER: 100%, WV: 10%, HER2: 0, Ki-67: 30%; Oncotype DX: 30    Treatment History:  - s/p LEFT lumpectomy and SLND (3/27/20)  - s/p re-excision (+) margin (1 mm residual dx found) (4/7/20)  - completed adjuvant XRT L breast: 4256 cGy (5/14-6/5/20)  - Started Femara May 2020  - DEXA August 2020-normal bone density     Malignant neoplasm of lower-outer quadrant of left breast of female, estrogen receptor positive   2/20/2020 Surgery    Surgery       Procedure:  Ultrasound guided core needle biopsy      Location:  1:00  "o'clock position      Invasive lobular carcinoma  ER/WY positive   Her 2 ted negative  Ki-67 30%     3/7/2020 Cancer Staged    Staging form: Breast, AJCC 8th Edition  - Pathologic stage from 3/7/2020: Stage IA (pT1, pN0, cM0, G2, ER+, WY+, HER2-, Oncotype DX score: 30) - Signed by Chanel Almonte APRN on 7/7/2021     3/27/2020 Initial Diagnosis    Malignant neoplasm of left breast in female, estrogen receptor positive (CMS/HCC)     3/27/2020 Surgery    Surgery       Procedure:  Left breast lumpectomy with SLNB          Invasive lobular carcinoma, 13 mm, ER/WY positive, her 2 ted negative, 2/2 SLN negative, positive superior medial margin       5/14/2020 - 6/5/2020 Radiation    Radiation OncologyTreatment Course:  Gini Edmondson received 4256 cGy in 16 fractions to left breast     7/7/2020 Surgery    Surgery       Procedure:  Reexcision of positive superior margin       Pathology revealed ILC single minute focus with negative margins.          Objective     Vitals:    12/27/23 1518   BP: 140/82   Pulse: 77   Resp: 16   Temp: 97.5 °F (36.4 °C)   SpO2: 97%   Weight: 83.6 kg (184 lb 4.9 oz)   Height: 157.5 cm (62.01\")   PainSc: 0-No pain     ECOG score: 0         PHQ-9 Total Score:         Physical Exam  Vitals reviewed. Exam conducted with a chaperone present.   Constitutional:       General: She is not in acute distress.     Appearance: Normal appearance.   HENT:      Head: Normocephalic and atraumatic.   Eyes:      Extraocular Movements: Extraocular movements intact.      Conjunctiva/sclera: Conjunctivae normal.   Pulmonary:      Effort: Pulmonary effort is normal.   Musculoskeletal:      Cervical back: Normal range of motion and neck supple.   Skin:     General: Skin is warm and dry.      Findings: No bruising.   Neurological:      Mental Status: She is oriented to person, place, and time.           Past Medical History     Past Medical History:   Diagnosis Date    Allergy to sunlight     PER PT    Cancer " of left breast     INVASVIE LOBULAR CARCINOMA    Cardiac disorder     DR. MALCOLM ANTHONY    Condition not found     RIGHT EYE CATARACT (LT CATARACT REPAIRED)    Constipation     Heart attack     3-22-18 NON STEMI, CABG 3-23-18    Hx of bronchitis     SMOKES 5-7 CIGERETTS PER DAY    Hx of CABG     3V CABG 2018    Hypertension     MEDS CONTROL    Neuropathy     IN FEETH FROM DM    PAD (peripheral artery disease)     Raynaud's syndrome     Status post left breast lumpectomy      Current Outpatient Medications on File Prior to Visit   Medication Sig Dispense Refill    Accu-Chek Guide test strip 1 each by Other route 3 (Three) Times a Day. as directed      albuterol sulfate  (90 Base) MCG/ACT inhaler Inhale 1 puff 2 (Two) Times a Day.      aspirin 325 MG tablet Take 1 tablet by mouth Daily.      ASPIRIN 81 PO Take 1 tablet/day by mouth Daily.      atorvastatin (LIPITOR) 10 MG tablet Take 1 tablet by mouth Daily.      B-D ULTRAFINE III SHORT PEN 31G X 8 MM misc Inject 1 application under the skin into the appropriate area as directed 2 (Two) Times a Day.      Biotin 1000 MCG tablet Take 1 tablet by mouth Daily.      docusate sodium (COLACE) 100 MG capsule Take 1 tablet by mouth Take As Directed.      empagliflozin (JARDIANCE) 25 MG tablet tablet Take 1 tablet by mouth Daily.      escitalopram (LEXAPRO) 10 MG tablet Take 1 tablet by mouth Daily.      insulin aspart prot & aspart (NovoLOG Mix 70/30 FlexPen) (70-30) 100 UNIT/ML suspension pen-injector injection Inject 0.54 mL under the skin into the appropriate area as directed.      insulin NPH-insulin regular (humuLIN 70/30,novoLIN 70/30) (70-30) 100 UNIT/ML injection Inject 50 Units under the skin into the appropriate area as directed 2 (Two) Times a Day With Meals.      Magnesium 500 MG capsule Take 1 tablet by mouth As Needed.      metoprolol tartrate (LOPRESSOR) 25 MG tablet Take 1 tablet by mouth 2 (Two) Times a Day.      montelukast (SINGULAIR) 10 MG tablet Take 1  tablet by mouth Every Night.      Omega-3 Fatty Acids (fish oil) 1000 MG capsule capsule Take 1 tablet by mouth Daily.      pentoxifylline (TRENtal) 400 MG CR tablet Take 1 tablet by mouth 2 (two) times a day.      pentoxifylline (TRENtal) 400 MG CR tablet Take 1 tablet by mouth 3 (Three) Times a Day.      senna (SENOKOT) 8.6 MG tablet Take 1 tablet by mouth Daily.      SITagliptin (JANUVIA) 100 MG tablet Take 1 tablet by mouth Daily.      [DISCONTINUED] letrozole (FEMARA) 2.5 MG tablet TAKE 1 TABLET BY MOUTH DAILY 90 tablet 2    Accu-Chek FastClix Lancets misc Inject 1 Device under the skin into the appropriate area as directed 2 (Two) Times a Day. as directed (Patient not taking: Reported on 12/27/2023)      atorvastatin (LIPITOR) 20 MG tablet Take 1 tablet by mouth Daily. (Patient not taking: Reported on 12/27/2023)       No current facility-administered medications on file prior to visit.      Allergies   Allergen Reactions    Cilostazol Shortness Of Breath    Sulfa Antibiotics Hives     Past Surgical History:   Procedure Laterality Date    BREAST LUMPECTOMY      LEFT    CARDIAC CATHETERIZATION N/A 9/15/2022    Procedure: Aortogram with bilateral leg angiogram, possible angioplasty or stenting;  Surgeon: Jose Armando Mcbride MD;  Location: [facility] INVASIVE LOCATION;  Service: Vascular;  Laterality: N/A;    COLONOSCOPY      CORONARY ARTERY BYPASS GRAFT      LAPAROSCOPIC TUBAL LIGATION      OTHER SURGICAL HISTORY      BILAT PAD LEGS    OTHER SURGICAL HISTORY      CHIP IN LEFT BREAST, MARKER FOR SPOT    OTHER SURGICAL HISTORY      LEFT KNEE REPAIR     Social History     Socioeconomic History    Marital status:    Tobacco Use    Smoking status: Every Day     Packs/day: 0.50     Years: 51.00     Additional pack years: 0.00     Total pack years: 25.50     Types: Cigarettes     Start date: 7/7/1970    Smokeless tobacco: Never    Tobacco comments:     7-10 CIGARETTES   Vaping Use    Vaping Use: Never used    Substance and Sexual Activity    Alcohol use: Not Currently    Drug use: Never    Sexual activity: Defer     Family History   Problem Relation Age of Onset    Prostate cancer Father     Diabetes Father     Pancreatic cancer Brother     Diabetes Brother     Breast cancer Maternal Aunt     Breast cancer Maternal Aunt     Stroke Mother     Breast cancer Paternal Aunt     Diabetes Other     Hypertension Other        Results     Result Review   The following data was reviewed by: Karan Moore MD on 11/29/2023:  Lab Results   Component Value Date    HGB 14.8 10/12/2023    HCT 46.1 (H) 10/12/2023    MCV 90.0 10/12/2023     10/12/2023    WBC 6.74 10/12/2023    NEUTROABS 4.40 10/12/2023    LYMPHSABS 1.67 10/12/2023    MONOSABS 0.53 10/12/2023    EOSABS 0.08 10/12/2023    BASOSABS 0.03 10/12/2023     Lab Results   Component Value Date    GLUCOSE 108 (H) 09/15/2022    BUN 10 11/02/2022    CREATININE 0.89 11/02/2022     11/02/2022    K 4.6 11/02/2022     (H) 11/02/2022    CO2 30 (H) 11/02/2022    CALCIUM 9.7 11/02/2022    PROTEINTOT 7.3 11/02/2022    ALBUMIN 4.2 11/02/2022    BILITOT 0.4 11/02/2022    ALKPHOS 53 11/02/2022    AST 19 11/02/2022    ALT 14 11/02/2022     Lab Results   Component Value Date    FREET4 0.97 04/20/2023    TSH 0.858 10/23/2020           No radiology results for the last day       Assessment & Plan     Diagnoses and all orders for this visit:    1. Malignant neoplasm of lower-outer quadrant of left breast of female, estrogen receptor positive (Primary)  -     Mammo Screening Digital Tomosynthesis Bilateral With CAD; Future  -     DEXA Bone Density Axial; Future  -     CBC & Differential; Future  -     Comprehensive Metabolic Panel; Future  -     Vitamin D,25-Hydroxy; Future  -     letrozole (FEMARA) 2.5 MG tablet; Take 1 tablet by mouth Daily.  Dispense: 90 tablet; Refill: 1    2. Encounter for screening mammogram for malignant neoplasm of breast  -     Mammo Screening Digital  Tomosynthesis Bilateral With CAD; Future    3. Post-menopausal  -     DEXA Bone Density Axial; Future  -     Vitamin D,25-Hydroxy; Future    4. Body mass index (BMI) 33.0-33.9, adult  -     Vitamin D,25-Hydroxy; Future        Gini Edmondson is a 73 y.o. female who presents to [facility] HEMATOLOGY [facility] ONCOLOGY for follow-up regarding left HR positive breast cancer, currently on letrozole.  Patient is tolerating his medication well, most recent CBC and CMP, no evidence of toxicity on those labs.  Recommend continuing letrozole.    -Plan to obtain BCI at year 4 of endocrine therapy to determine if she benefits from extended endocrine therapy.    Last mammo 5/23/23, BI-RADS 2 benign, due again in May 2024, last DEXA 11/9/22 (normal) due  again in November 2024.    Plan patient follow-up in next months with CBC, CMP, vitamin D and mammogram results.    Please note that portions of this note were completed with a voice recognition program.    Electronically signed by Karan Moore MD, 12/27/23, 5:53 PM EST.      Follow Up     I spent 30 minutes caring for Gini on this date of service. This time includes time spent by me in the following activities:preparing for the visit, reviewing tests, obtaining and/or reviewing a separately obtained history, performing a medically appropriate examination and/or evaluation , counseling and educating the patient/family/caregiver, ordering medications, tests, or procedures, referring and communicating with other health care professionals , documenting information in the medical record, independently interpreting results and communicating that information with the patient/family/caregiver, and care coordination.    This is an acute or chronic illness that poses a threat to life or bodily function. The above treatment plan involves a high risk of complications and/or mortality of patient management.    The patient was seen and examined. Work by the provider also  included review and/or ordering of lab tests, review and/or ordering of radiology tests, review and/or ordering of medicine tests, discussion with other physicians or providers, independent review of data, obtaining old records, review/summation of old records, and/or other review.    I have reviewed the family history, social history, and past medical history for this patient. Previous information and data has been reviewed and updated as needed. I have reviewed and verified the chief complaint, history, and other documentation. The patient was interviewed and examined in the clinic and the chart reviewed. The previous observations, recommendations, and conclusions were reviewed including those of other providers.     The plan was discussed with the patient and/or family. The patient was given time to ask questions and these questions were answered. At the conclusion of their visit they had no additional questions or concerns and all questions were answered to their satisfaction.    Patient was given instructions and counseling regarding her condition or for health maintenance advice. Please see specific information pulled into the AVS if appropriate.

## 2024-03-13 ENCOUNTER — OFFICE VISIT (OUTPATIENT)
Dept: SURGERY | Facility: CLINIC | Age: 74
End: 2024-03-13
Payer: MEDICARE

## 2024-03-13 ENCOUNTER — PREP FOR SURGERY (OUTPATIENT)
Dept: OTHER | Facility: HOSPITAL | Age: 74
End: 2024-03-13
Payer: MEDICARE

## 2024-03-13 VITALS
WEIGHT: 177 LBS | DIASTOLIC BLOOD PRESSURE: 62 MMHG | SYSTOLIC BLOOD PRESSURE: 133 MMHG | BODY MASS INDEX: 32.37 KG/M2 | HEART RATE: 79 BPM

## 2024-03-13 DIAGNOSIS — R10.9 ABDOMINAL PAIN: Primary | ICD-10-CM

## 2024-03-13 DIAGNOSIS — R10.84 GENERALIZED ABDOMINAL PAIN: Primary | ICD-10-CM

## 2024-03-13 DIAGNOSIS — Z86.010 HISTORY OF COLONIC POLYPS: ICD-10-CM

## 2024-03-13 DIAGNOSIS — Z85.3 HISTORY OF BREAST CANCER: ICD-10-CM

## 2024-03-13 RX ORDER — SODIUM CHLORIDE 9 MG/ML
40 INJECTION, SOLUTION INTRAVENOUS AS NEEDED
OUTPATIENT
Start: 2024-03-13

## 2024-03-13 RX ORDER — SODIUM CHLORIDE 0.9 % (FLUSH) 0.9 %
3 SYRINGE (ML) INJECTION EVERY 12 HOURS SCHEDULED
OUTPATIENT
Start: 2024-03-13

## 2024-03-13 RX ORDER — PEG-3350, SODIUM SULFATE, SODIUM CHLORIDE, POTASSIUM CHLORIDE, SODIUM ASCORBATE AND ASCORBIC ACID 7.5-2.691G
1000 KIT ORAL ONCE
Qty: 1000 ML | Refills: 0 | Status: SHIPPED | OUTPATIENT
Start: 2024-03-13 | End: 2024-03-13

## 2024-03-13 RX ORDER — SODIUM CHLORIDE 0.9 % (FLUSH) 0.9 %
10 SYRINGE (ML) INJECTION AS NEEDED
OUTPATIENT
Start: 2024-03-13

## 2024-03-13 RX ORDER — OMEPRAZOLE 40 MG/1
CAPSULE, DELAYED RELEASE ORAL
COMMUNITY
Start: 2024-02-07

## 2024-03-13 NOTE — PROGRESS NOTES
Chief Complaint: Colonoscopy (consult)    Subjective      Colonoscopy consultation       History of Present Illness  Gini Edmondson is a 73 y.o. female presents to [facility] GENERAL SURGERY for colonoscopy consultation.    Patient presents today on referral from Cheyenne Pinto for colonoscopy consultation.  Patient reports that she is having generalized abdominal pain.  She denies any change in bowel habit or rectal bleeding.  Denies any family history of colorectal cancer.    Patient patient has a history of breast cancer and is under the care of Dr. Allred.    Patient admits to KRISTIAN.  She does not use her CPAP.    Patient did have a triple bypass in 2018 and she does take  mg.  Patient is under the care of Dr. Yonathan Torres.  I will get cardiac clearance prior to the procedure.    12/13: Colonoscopy ([facility]); Ileocecal valve: lymphoid aggregate; right- serrated; sigmoid- serrated hyperplastic; Sigmoid- serrated hyperplastic; diverticulosis.   Objective     Past Medical History:   Diagnosis Date    Allergy to sunlight     PER PT    Cancer of left breast     INVASVIE LOBULAR CARCINOMA    Cardiac disorder     DR. MALCOLM ANTHONY    Condition not found     RIGHT EYE CATARACT (LT CATARACT REPAIRED)    Constipation     Heart attack     3-22-18 NON STEMI, CABG 3-23-18    Hx of bronchitis     SMOKES 5-7 CIGERETTS PER DAY    Hx of CABG     3V CABG 2018    Hypertension     MEDS CONTROL    Neuropathy     IN FEETH FROM DM    PAD (peripheral artery disease)     Raynaud's syndrome     Status post left breast lumpectomy        Past Surgical History:   Procedure Laterality Date    BREAST LUMPECTOMY      LEFT    CARDIAC CATHETERIZATION N/A 9/15/2022    Procedure: Aortogram with bilateral leg angiogram, possible angioplasty or stenting;  Surgeon: Jose Armando Mcbride MD;  Location: [facility] INVASIVE LOC[facility];  Service: Vascular;  Laterality: N/A;    COLONOSCOPY      CORONARY ARTERY BYPASS GRAFT       LAPAROSCOPIC TUBAL LIGATION      OTHER SURGICAL HISTORY      BILAT PAD LEGS    OTHER SURGICAL HISTORY      CHIP IN LEFT BREAST, MARKER FOR SPOT    OTHER SURGICAL HISTORY      LEFT KNEE REPAIR       Outpatient Medications Marked as Taking for the 3/13/24 encounter (Office Visit) with Lupis Frank, JACY   Medication Sig Dispense Refill    Accu-Chek Guide test strip 1 each by Other route 3 (Three) Times a Day. as directed      albuterol sulfate  (90 Base) MCG/ACT inhaler Inhale 1 puff 2 (Two) Times a Day.      aspirin 325 MG tablet Take 1 tablet by mouth Daily.      atorvastatin (LIPITOR) 10 MG tablet Take 1 tablet by mouth Daily.      B-D ULTRAFINE III SHORT PEN 31G X 8 MM misc Inject 1 application under the skin into the appropriate area as directed 2 (Two) Times a Day.      Biotin 1000 MCG tablet Take 1 tablet by mouth Daily.      docusate sodium (COLACE) 100 MG capsule Take 1 tablet by mouth Take As Directed.      insulin aspart prot & aspart (NovoLOG Mix 70/30 FlexPen) (70-30) 100 UNIT/ML suspension pen-injector injection Inject 0.54 mL under the skin into the appropriate area as directed.      letrozole (FEMARA) 2.5 MG tablet Take 1 tablet by mouth Daily. 90 tablet 1    Magnesium 500 MG capsule Take 1 tablet by mouth As Needed.      metoprolol tartrate (LOPRESSOR) 25 MG tablet Take 1 tablet by mouth 2 (Two) Times a Day.      montelukast (SINGULAIR) 10 MG tablet Take 1 tablet by mouth Every Night.      Omega-3 Fatty Acids (fish oil) 1000 MG capsule capsule Take 1 tablet by mouth Daily.      omeprazole (priLOSEC) 40 MG capsule TAKE 1 CAPSULE BY MOUTH EVERY EVENING AS NEEDED FOR ACID REFLUX      pentoxifylline (TRENtal) 400 MG CR tablet Take 1 tablet by mouth 3 (Three) Times a Day.      senna (SENOKOT) 8.6 MG tablet Take 1 tablet by mouth Daily.      SITagliptin (JANUVIA) 100 MG tablet Take 1 tablet by mouth Daily.         Allergies   Allergen Reactions    Cilostazol Shortness Of Breath    Sulfa Antibiotics  Hives        Family History   Problem Relation Age of Onset    Prostate cancer Father     Diabetes Father     Pancreatic cancer Brother     Diabetes Brother     Breast cancer Maternal Aunt     Breast cancer Maternal Aunt     Stroke Mother     Breast cancer Paternal Aunt     Diabetes Other     Hypertension Other        Social History     Socioeconomic History    Marital status:    Tobacco Use    Smoking status: Every Day     Current packs/day: 0.50     Average packs/day: 0.5 packs/day for 53.7 years (26.8 ttl pk-yrs)     Types: Cigarettes     Start date: 7/7/1970    Smokeless tobacco: Never    Tobacco comments:     7-10 CIGARETTES   Vaping Use    Vaping status: Never Used   Substance and Sexual Activity    Alcohol use: Not Currently    Drug use: Never    Sexual activity: Defer       Review of Systems   Constitutional:  Negative for chills and fever.   Gastrointestinal:  Negative for abdominal distention, abdominal pain, anal bleeding, blood in stool, constipation, diarrhea and rectal pain.        Vital Signs:   /62 (BP Location: Left arm)   Pulse 79   Wt 80.3 kg (177 lb)   BMI 32.37 kg/m²      Physical Exam  Vitals and nursing note reviewed.   Constitutional:       General: She is not in acute distress.     Appearance: Normal appearance.   HENT:      Head: Normocephalic.   Cardiovascular:      Rate and Rhythm: Normal rate.   Pulmonary:      Effort: Pulmonary effort is normal.   Abdominal:      Palpations: Abdomen is soft.      Tenderness: There is no guarding.   Musculoskeletal:         General: No deformity. Normal range of motion.   Skin:     General: Skin is warm and dry.      Coloration: Skin is not jaundiced.   Neurological:      General: No focal deficit present.      Mental Status: She is alert and oriented to person, place, and time.   Psychiatric:         Mood and Affect: Mood normal.         Thought Content: Thought content normal.          Result Review :          []  Laboratory  []   Radiology  []  Pathology  []  Microbiology  []  EKG/Telemetry   []  Cardiology/Vascular   []  Old records  I spent 15 minutes caring for Gini on this date of service. This time includes time spent by me in the following activities: reviewing tests, obtaining and/or reviewing a separately obtained history, performing a medically appropriate examination and/or evaluation, ordering medications, tests, or procedures, and documenting information in the medical record.     Assessment and Plan    Diagnoses and all orders for this visit:    1. Generalized abdominal pain (Primary)    2. History of breast cancer    3. History of colonic polyps    Other orders  -     PEG-KCl-NaCl-NaSulf-Na Asc-C (MOVIPREP) 100 g reconstituted solution powder; Take 1,000 mL by mouth 1 (One) Time for 1 dose.  Dispense: 1000 mL; Refill: 0    May continue ASA-325 mg    Cardiac clearance from Yonathan Torres.    NovoLo/2 : 0 units; : 27 units a.m. and p.m.    Follow Up   Return for Scheduled colonoscopy with Dr. Cho on 2024 [facility].    Hospital arrival time: 0730    Possible risks/complications, benefits, and alternatives to surgical or invasive procedures have been explained to patient and/or legal guardian.    Patient has been evaluated and can tolerate anesthesia and/or sedation. Risks, benefits, and alternatives to anesthesia and sedation have been explained to the patient and/or legal guardian. Patient verbalizes understanding and is willing to proceed with the above plan.     Patient was given instructions and counseling regarding her condition or for health maintenance advice. Please see specific information pulled into the AVS if appropriate.

## 2024-03-14 ENCOUNTER — TELEPHONE (OUTPATIENT)
Dept: SURGERY | Facility: CLINIC | Age: 74
End: 2024-03-14
Payer: MEDICARE

## 2024-03-18 NOTE — TELEPHONE ENCOUNTER
I have called and spoke to the Pt. She is aware that I am mailing out an updated bowel prep sheet with her Novolog information on it. Pt V/U.

## 2024-03-25 ENCOUNTER — HOSPITAL ENCOUNTER (OUTPATIENT)
Dept: OTHER | Facility: HOSPITAL | Age: 74
Discharge: HOME | End: 2024-03-25

## 2024-04-03 ENCOUNTER — HOSPITAL ENCOUNTER (OUTPATIENT)
Dept: OTHER | Facility: HOSPITAL | Age: 74
Discharge: HOME | End: 2024-04-03

## 2024-04-10 ENCOUNTER — TELEPHONE (OUTPATIENT)
Dept: SURGERY | Facility: CLINIC | Age: 74
End: 2024-04-10
Payer: MEDICARE

## 2024-04-10 NOTE — TELEPHONE ENCOUNTER
Attempted to reach pt.  Man answered phone, I tried to leave a message with the man that answered but I was cut off and told that he was not a  and line was disconnected.

## 2024-04-10 NOTE — TELEPHONE ENCOUNTER
Pt called back.  I informed her that I spoke to 's office and that she would need an appt with their office before she could be cleared for surgery.  Pt v/u and stated she would call and make an appt right away.

## 2024-04-11 ENCOUNTER — TRANSCRIBE ORDERS (OUTPATIENT)
Dept: ADMINISTRATIVE | Facility: HOSPITAL | Age: 74
End: 2024-04-11
Payer: MEDICARE

## 2024-04-11 DIAGNOSIS — R91.1 INCIDENTAL LUNG NODULE: Primary | ICD-10-CM

## 2024-04-18 ENCOUNTER — HOSPITAL ENCOUNTER (OUTPATIENT)
Dept: PET IMAGING | Facility: HOSPITAL | Age: 74
Discharge: HOME | End: 2024-04-18
Payer: MEDICARE

## 2024-04-18 DIAGNOSIS — R91.1 INCIDENTAL LUNG NODULE: ICD-10-CM

## 2024-04-30 ENCOUNTER — ANESTHESIA EVENT (OUTPATIENT)
Dept: GASTROENTEROLOGY | Facility: HOSPITAL | Age: 74
End: 2024-04-30
Payer: MEDICARE

## 2024-04-30 NOTE — ANESTHESIA PREPROCEDURE EVALUATION
Anesthesia Evaluation     Patient summary reviewed and Nursing notes reviewed   NPO Solid Status: > 8 hours  NPO Liquid Status: > 4 hours           Airway   Mallampati: II  TM distance: >3 FB  Neck ROM: full  No difficulty expected  Dental      Comment: Permanent dental implants      Pulmonary    (+) a smoker Current, Smoked day of surgery, cigarettes, COPD (inhalers most days) moderate, asthma,recent URI (was treated for URI on April 04, 2024) resolved, rhonchi, decreased breath sounds  Cardiovascular - normal exam  Exercise tolerance: poor (<4 METS)    Patient on routine beta blocker  Rhythm: regular  Rate: normal    (+) hypertension well controlled, past MI  >12 months, CABG >6 Months, PVD    ROS comment: Taking metoprolol     Neuro/Psych  (+) psychiatric history Depression  GI/Hepatic/Renal/Endo    (+) obesity, diabetes mellitus type 2 poorly controlled, thyroid problem     Musculoskeletal     Abdominal    Substance History      OB/GYN          Other      history of cancer (involving lymph nodes - 2020)    ROS/Med Hx Other: No EKG on file     Took Ozempic on Tuesday 4/30 ( was not on original med list ) - spoke with CRNA and Dr. Cho and explained risks vs benefits of anesthesia with patient and decided to reschedule this procedure with patient based on Asa guidelines       Phys Exam Other: Duoneb in preop               Anesthesia Plan    ASA 3     general   total IV anesthesia  (Total IV Anesthesia    Patient understands anesthesia not responsible for dental damage.  )  intravenous induction     Anesthetic plan, risks, benefits, and alternatives have been provided, discussed and informed consent has been obtained with: patient.  Pre-procedure education provided  Plan discussed with CRNA.      CODE STATUS:

## 2024-04-30 NOTE — PRE-PROCEDURE INSTRUCTIONS
Reminded of arrival time at 0730, Entrance C of the [facility]. Instructed to bring or have a  over the age of 18 set up to drive you home the day of procedure.    Instructed on clear liquid diet the day before, nothing red or purple. Call with any questions about the prep or if in need of the prep.  Reminded them not to eat or drink anything am of procedure unless its a sip of water with medications.

## 2024-05-02 ENCOUNTER — ANESTHESIA (OUTPATIENT)
Dept: GASTROENTEROLOGY | Facility: HOSPITAL | Age: 74
End: 2024-05-02
Payer: MEDICARE

## 2024-05-02 ENCOUNTER — TELEPHONE (OUTPATIENT)
Dept: SURGERY | Facility: CLINIC | Age: 74
End: 2024-05-02
Payer: MEDICARE

## 2024-05-02 ENCOUNTER — HOSPITAL ENCOUNTER (OUTPATIENT)
Facility: HOSPITAL | Age: 74
Setting detail: HOSPITAL OUTPATIENT SURGERY
Discharge: HOME | End: 2024-05-02
Attending: SURGERY | Admitting: SURGERY
Payer: MEDICARE

## 2024-05-02 VITALS
TEMPERATURE: 96.9 F | WEIGHT: 176.15 LBS | OXYGEN SATURATION: 96 % | HEART RATE: 77 BPM | BODY MASS INDEX: 32.21 KG/M2 | RESPIRATION RATE: 16 BRPM | SYSTOLIC BLOOD PRESSURE: 135 MMHG | DIASTOLIC BLOOD PRESSURE: 64 MMHG

## 2024-05-02 DIAGNOSIS — R10.9 ABDOMINAL PAIN: ICD-10-CM

## 2024-05-02 PROCEDURE — 94640 AIRWAY INHALATION TREATMENT: CPT

## 2024-05-02 PROCEDURE — G0463 HOSPITAL OUTPT CLINIC VISIT: HCPCS | Performed by: SURGERY

## 2024-05-02 RX ORDER — IPRATROPIUM BROMIDE AND ALBUTEROL SULFATE 2.5; .5 MG/3ML; MG/3ML
3 SOLUTION RESPIRATORY (INHALATION) ONCE
Status: COMPLETED | OUTPATIENT
Start: 2024-05-02 | End: 2024-05-02

## 2024-05-02 RX ORDER — SEMAGLUTIDE 1.34 MG/ML
0.25 INJECTION, SOLUTION SUBCUTANEOUS WEEKLY
COMMUNITY

## 2024-05-02 RX ORDER — SODIUM CHLORIDE 0.9 % (FLUSH) 0.9 %
3 SYRINGE (ML) INJECTION EVERY 12 HOURS SCHEDULED
Status: DISCONTINUED | OUTPATIENT
Start: 2024-05-02 | End: 2024-05-02 | Stop reason: HOSPADM

## 2024-05-02 RX ORDER — SODIUM CHLORIDE 9 MG/ML
40 INJECTION, SOLUTION INTRAVENOUS AS NEEDED
Status: DISCONTINUED | OUTPATIENT
Start: 2024-05-02 | End: 2024-05-02 | Stop reason: HOSPADM

## 2024-05-02 RX ORDER — SODIUM CHLORIDE 0.9 % (FLUSH) 0.9 %
10 SYRINGE (ML) INJECTION AS NEEDED
Status: DISCONTINUED | OUTPATIENT
Start: 2024-05-02 | End: 2024-05-02 | Stop reason: HOSPADM

## 2024-05-02 RX ORDER — INSULIN GLARGINE 100 [IU]/ML
25 INJECTION, SOLUTION SUBCUTANEOUS NIGHTLY
COMMUNITY

## 2024-05-02 RX ORDER — SODIUM CHLORIDE, SODIUM LACTATE, POTASSIUM CHLORIDE, CALCIUM CHLORIDE 600; 310; 30; 20 MG/100ML; MG/100ML; MG/100ML; MG/100ML
30 INJECTION, SOLUTION INTRAVENOUS CONTINUOUS
Status: DISCONTINUED | OUTPATIENT
Start: 2024-05-02 | End: 2024-05-02 | Stop reason: HOSPADM

## 2024-05-02 RX ADMIN — IPRATROPIUM BROMIDE AND ALBUTEROL SULFATE 3 ML: .5; 3 SOLUTION RESPIRATORY (INHALATION) at 08:12

## 2024-05-02 NOTE — H&P
General Surgery/Colorectal Surgery Note    Patient Name:  Gini Edmondson  YOB: 1950  [id number]    Referring Provider: Dheeraj Cho, *      Patient Care Team:  Cheyenne Russell APRN as PCP - General (Nurse Practitioner)  Pat Dawkins MD PhD as Consulting Physician (Hematology and Oncology)    Subjective .     History of present illness:    HPI   referral from Cheyenne Pinto for colonoscopy consultation. Patient reports that she is having generalized abdominal pain. She denies any change in bowel habit or rectal bleeding. Denies any family history of colorectal cancer.   PH colorectal polyps    History:  Past Medical History:   Diagnosis Date    Allergy to sunlight     PER PT    Asthma     Cancer of left breast     INVASVIE LOBULAR CARCINOMA    Cardiac disorder     DR. MALCOLM ANTHONY    Condition not found     RIGHT EYE CATARACT (LT CATARACT REPAIRED)    Constipation     COPD (chronic obstructive pulmonary disease)     Heart attack     3-22-18 NON STEMI, CABG 3-23-18    Hx of bronchitis     SMOKES 5-7 CIGERETTS PER DAY    Hx of CABG     3V CABG 2018    Hypertension     MEDS CONTROL    Neuropathy     IN FEETH FROM DM    PAD (peripheral artery disease)     Raynaud's syndrome     Status post left breast lumpectomy        Past Surgical History:   Procedure Laterality Date    BREAST LUMPECTOMY      LEFT    CARDIAC CATHETERIZATION N/A 9/15/2022    Procedure: Aortogram with bilateral leg angiogram, possible angioplasty or stenting;  Surgeon: Jose Armando Mcbride MD;  Location: [facility] CATH INVASIVE LOCATION;  Service: Vascular;  Laterality: N/A;    COLONOSCOPY      CORONARY ARTERY BYPASS GRAFT      LAPAROSCOPIC TUBAL LIGATION      OTHER SURGICAL HISTORY      BILAT PAD LEGS    OTHER SURGICAL HISTORY      CHIP IN LEFT BREAST, MARKER FOR SPOT    OTHER SURGICAL HISTORY      LEFT KNEE REPAIR       Family History   Problem Relation Age of Onset    Stroke Mother     Prostate cancer Father      Diabetes Father     Pancreatic cancer Brother     Diabetes Brother     Breast cancer Maternal Aunt     Breast cancer Maternal Aunt     Breast cancer Paternal Aunt     Diabetes Other     Hypertension Other     Malig Hyperthermia Neg Hx        Social History     Tobacco Use    Smoking status: Every Day     Current packs/day: 0.50     Average packs/day: 0.5 packs/day for 53.8 years (26.9 ttl pk-yrs)     Types: Cigarettes     Start date: 7/7/1970    Smokeless tobacco: Never    Tobacco comments:     7-10 CIGARETTES     LAST SMOKED 5/2 0400   Vaping Use    Vaping status: Never Used   Substance Use Topics    Alcohol use: Not Currently    Drug use: Never       Review of Systems: See HPI    Review of Systems            Medications Prior to Admission   Medication Sig Dispense Refill Last Dose    albuterol sulfate  (90 Base) MCG/ACT inhaler Inhale 1 puff 2 (Two) Times a Day.   5/2/2024    ASPIRIN 81 PO Take 1 tablet/day by mouth Daily.   Past Week    escitalopram (LEXAPRO) 10 MG tablet Take 1 tablet by mouth Daily.   5/1/2024    letrozole (FEMARA) 2.5 MG tablet Take 1 tablet by mouth Daily. 90 tablet 1 Past Week    metoprolol tartrate (LOPRESSOR) 25 MG tablet Take 1 tablet by mouth 2 (Two) Times a Day.   Past Week    montelukast (SINGULAIR) 10 MG tablet Take 1 tablet by mouth Every Night.   5/1/2024    Accu-Chek FastClix Lancets misc Inject 1 Device under the skin into the appropriate area as directed 2 (Two) Times a Day. as directed (Patient not taking: Reported on 3/13/2024)       Accu-Chek Guide test strip 1 each by Other route 3 (Three) Times a Day. as directed       atorvastatin (LIPITOR) 10 MG tablet Take 1 tablet by mouth Daily.       B-D ULTRAFINE III SHORT PEN 31G X 8 MM misc Inject 1 application under the skin into the appropriate area as directed 2 (Two) Times a Day.       Biotin 1000 MCG tablet Take 1 tablet by mouth Daily.       docusate sodium (COLACE) 100 MG capsule Take 1 tablet by mouth Take As  Directed.       insulin glargine (LANTUS, SEMGLEE) 100 UNIT/ML injection Inject 25 Units under the skin into the appropriate area as directed Every Night.       Magnesium 500 MG capsule Take 1 tablet by mouth As Needed.       Omega-3 Fatty Acids (fish oil) 1000 MG capsule capsule Take 1 tablet by mouth Daily.       omeprazole (priLOSEC) 40 MG capsule TAKE 1 CAPSULE BY MOUTH EVERY EVENING AS NEEDED FOR ACID REFLUX       pentoxifylline (TRENtal) 400 MG CR tablet Take 1 tablet by mouth 2 (two) times a day.       pentoxifylline (TRENtal) 400 MG CR tablet Take 1 tablet by mouth 3 (Three) Times a Day.       Semaglutide,0.25 or 0.5MG/DOS, (Ozempic, 0.25 or 0.5 MG/DOSE,) 2 MG/1.5ML solution pen-injector Inject 0.25 mg under the skin into the appropriate area as directed 1 (One) Time Per Week.   4/30/2024    senna (SENOKOT) 8.6 MG tablet Take 1 tablet by mouth Daily.            Home Meds:      Prior to Admission medications    Medication Sig Start Date End Date Taking? Authorizing Provider   albuterol sulfate  (90 Base) MCG/ACT inhaler Inhale 1 puff 2 (Two) Times a Day.   Yes ProviderDaljit MD   ASPIRIN 81 PO Take 1 tablet/day by mouth Daily.   Yes Daljit Miller MD   escitalopram (LEXAPRO) 10 MG tablet Take 1 tablet by mouth Daily. 5/18/21  Yes Daljit Miller MD   letrozole (FEMARA) 2.5 MG tablet Take 1 tablet by mouth Daily. 12/27/23  Yes Karan Moore MD   metoprolol tartrate (LOPRESSOR) 25 MG tablet Take 1 tablet by mouth 2 (Two) Times a Day.   Yes Daljit Miller MD   montelukast (SINGULAIR) 10 MG tablet Take 1 tablet by mouth Every Night. 4/19/23  Yes Daljit Miller MD Accu-Chek FastClix Lancets misc Inject 1 Device under the skin into the appropriate area as directed 2 (Two) Times a Day. as directed  Patient not taking: Reported on 3/13/2024 6/1/21   Daljit Miller MD Accu-Chek Guide test strip 1 each by Other route 3 (Three) Times a Day. as directed 6/1/21    Daljit Miller MD   atorvastatin (LIPITOR) 10 MG tablet Take 1 tablet by mouth Daily. 10/27/22   Daljit Miller MD B-KATHI ULTRAFINE III SHORT PEN 31G X 8 MM misc Inject 1 application under the skin into the appropriate area as directed 2 (Two) Times a Day. 6/1/21   Daljit Miller MD   Biotin 1000 MCG tablet Take 1 tablet by mouth Daily.    Daljit Miller MD   docusate sodium (COLACE) 100 MG capsule Take 1 tablet by mouth Take As Directed.    Daljit Miller MD   insulin glargine (LANTUS, SEMGLEE) 100 UNIT/ML injection Inject 25 Units under the skin into the appropriate area as directed Every Night.    Daljit Miller MD   Magnesium 500 MG capsule Take 1 tablet by mouth As Needed.    Daljit Miller MD   Omega-3 Fatty Acids (fish oil) 1000 MG capsule capsule Take 1 tablet by mouth Daily.    Daljit Miller MD   omeprazole (priLOSEC) 40 MG capsule TAKE 1 CAPSULE BY MOUTH EVERY EVENING AS NEEDED FOR ACID REFLUX 2/7/24   Daljit Miller MD   pentoxifylline (TRENtal) 400 MG CR tablet Take 1 tablet by mouth 2 (two) times a day.    Daljit Miller MD   pentoxifylline (TRENtal) 400 MG CR tablet Take 1 tablet by mouth 3 (Three) Times a Day. 10/27/22   Daljit Miller MD   Semaglutide,0.25 or 0.5MG/DOS, (Ozempic, 0.25 or 0.5 MG/DOSE,) 2 MG/1.5ML solution pen-injector Inject 0.25 mg under the skin into the appropriate area as directed 1 (One) Time Per Week.    Daljit Miller MD   senna (SENOKOT) 8.6 MG tablet Take 1 tablet by mouth Daily.    Daljit Miller MD   aspirin 325 MG tablet Take 1 tablet by mouth Daily.  5/2/24  Daljit Miller MD   atorvastatin (LIPITOR) 20 MG tablet Take 1 tablet by mouth Daily.  Patient not taking: Reported on 12/27/2023 5/2/24  Daljit Miller MD   empagliflozin (JARDIANCE) 25 MG tablet tablet Take 1 tablet by mouth Daily.  Patient not taking: Reported on 3/13/2024 10/27/22 5/2/24  Daljit Miller  MD   insulin aspart prot & aspart (NovoLOG Mix 70/30 FlexPen) (70-30) 100 UNIT/ML suspension pen-injector injection Inject 0.54 mL under the skin into the appropriate area as directed. 10/27/22 5/2/24  Daljit Miller MD   insulin NPH-insulin regular (humuLIN 70/30,novoLIN 70/30) (70-30) 100 UNIT/ML injection Inject 50 Units under the skin into the appropriate area as directed 2 (Two) Times a Day With Meals.  Patient not taking: Reported on 3/13/2024  5/2/24  Daljit Miller MD   SITagliptin (JANUVIA) 100 MG tablet Take 1 tablet by mouth Daily. 6/17/21 5/2/24  Daljit Miller MD            Allergies:  Cilostazol and Sulfa antibiotics      Objective     Vital Signs   Temp:  [96.9 °F (36.1 °C)] 96.9 °F (36.1 °C)  Heart Rate:  [77] 77  Resp:  [16] 16  BP: (135)/(64) 135/64    Physical Exam:     Physical Exam    NAD, A/O x 4, normal circulation, normal respiration      Result Review :     Assessment & Plan     Diagnoses and all orders for this visit:    1. Abdominal pain  -     sodium chloride 0.9 % flush 3 mL  -     sodium chloride 0.9 % flush 10 mL  -     sodium chloride 0.9 % infusion 40 mL    Other orders  -     Continuous Pulse Oximetry; Standing  -     POC Glucose Once; Standing  -     Insert Peripheral IV; Standing  -     lactated ringers infusion  -     Continuous Pulse Oximetry  -     POC Glucose Once  -     Insert Peripheral IV  -     Follow Anesthesia Guidelines / Protocol; Standing  -     Verify Bowel Prep Was Successful; Standing  -     Give Tap Water Enema If Bowel Prep Insufficient; Standing  -     Insert Peripheral IV; Standing  -     Saline Lock & Maintain IV Access; Standing  -     Verify NPO Status; Standing  -     Obtain Informed Consent; Standing  -     Follow Anesthesia Guidelines / Protocol  -     Verify Bowel Prep Was Successful  -     Give Tap Water Enema If Bowel Prep Insufficient  -     Insert Peripheral IV  -     Saline Lock & Maintain IV Access  -     Verify NPO Status  -      Obtain Informed Consent  -     ipratropium-albuterol (DUO-NEB) nebulizer solution 3 mL           Risks including bleeding, perforation, pain, infection, missed polyp(s). Benefits, and alternatives of Colonoscopy discussed with patient.  All questions answered.  Consent verified.         Dheeraj Cho MD  05/02/24 08:28 EDT

## 2024-05-08 ENCOUNTER — PREP FOR SURGERY (OUTPATIENT)
Dept: OTHER | Facility: HOSPITAL | Age: 74
End: 2024-05-08
Payer: MEDICARE

## 2024-05-08 DIAGNOSIS — Z86.010 PERSONAL HISTORY OF COLONIC POLYPS: ICD-10-CM

## 2024-05-08 DIAGNOSIS — R10.84 GENERALIZED ABDOMINAL PAIN: Primary | ICD-10-CM

## 2024-05-08 RX ORDER — SODIUM, POTASSIUM,MAG SULFATES 17.5-3.13G
SOLUTION, RECONSTITUTED, ORAL ORAL
Qty: 177 ML | Refills: 0 | Status: SHIPPED | OUTPATIENT
Start: 2024-05-08

## 2024-05-09 PROBLEM — Z86.010 PERSONAL HISTORY OF COLONIC POLYPS: Status: ACTIVE | Noted: 2024-05-08

## 2024-05-09 PROBLEM — Z86.0100 PERSONAL HISTORY OF COLONIC POLYPS: Status: ACTIVE | Noted: 2024-05-08

## 2024-06-03 ENCOUNTER — TELEPHONE (OUTPATIENT)
Dept: ONCOLOGY | Facility: HOSPITAL | Age: 74
End: 2024-06-03
Payer: MEDICARE

## 2024-06-17 ENCOUNTER — TRANSCRIBE ORDERS (OUTPATIENT)
Dept: ADMINISTRATIVE | Facility: HOSPITAL | Age: 74
End: 2024-06-17
Payer: MEDICARE

## 2024-06-17 DIAGNOSIS — Z12.31 BREAST CANCER SCREENING BY MAMMOGRAM: Primary | ICD-10-CM

## 2024-06-25 ENCOUNTER — HOSPITAL ENCOUNTER (OUTPATIENT)
Dept: PET IMAGING | Facility: HOSPITAL | Age: 74
Discharge: HOME | End: 2024-06-25
Payer: MEDICARE

## 2024-06-25 PROCEDURE — 0 FLUDEOXYGLUCOSE F18 SOLUTION: Performed by: NURSE PRACTITIONER

## 2024-06-25 PROCEDURE — A9552 F18 FDG: HCPCS | Performed by: NURSE PRACTITIONER

## 2024-06-25 PROCEDURE — 78815 PET IMAGE W/CT SKULL-THIGH: CPT

## 2024-06-25 RX ADMIN — FLUDEOXYGLUCOSE F 18 1 DOSE: 200 INJECTION, SOLUTION INTRAVENOUS at 15:25

## 2024-07-01 NOTE — PRE-PROCEDURE INSTRUCTIONS
"Instructed on date and arrival time of 1300. Come to entrance \"C\". Must have  over age 18 to drive home.  May have two visitors; however, children under 12 must stay in waiting room.  Discussed clear liquid diet (no red or purple), bowel prep, and NPO.  May take medications as usual except for blood thinners, diabetic medications, and weight loss medications.  Verbalized understanding of instructions given.  Instructed to call for questions or concerns.  Hold Ozempic for one week prior to procedure.  Cardiac clearance noted in chart.  "

## 2024-07-05 ENCOUNTER — ANESTHESIA EVENT (OUTPATIENT)
Dept: GASTROENTEROLOGY | Facility: HOSPITAL | Age: 74
End: 2024-07-05
Payer: MEDICARE

## 2024-07-05 NOTE — ANESTHESIA PREPROCEDURE EVALUATION
Anesthesia Evaluation     Patient summary reviewed and Nursing notes reviewed   NPO Solid Status: > 8 hours  NPO Liquid Status: > 2 hours           Airway   Mallampati: II  TM distance: >3 FB  Neck ROM: full  No difficulty expected  Dental          Pulmonary - normal exam    breath sounds clear to auscultation  (+) a smoker Current, COPD mild, asthma,  Cardiovascular - normal exam  Exercise tolerance: good (4-7 METS)    ECG reviewed  Rhythm: regular  Rate: normal    (+) hypertension well controlled, past MI  >12 months, CABG >6 Months, PVD      Neuro/Psych  (+) psychiatric history Depression  GI/Hepatic/Renal/Endo    (+) obesity, diabetes mellitus type 2 well controlled, thyroid problem     Musculoskeletal     Abdominal  - normal exam   Substance History      OB/GYN          Other      history of cancer    ROS/Med Hx Other: Last dose Ozempic -June 11th per phone conversation with patient     EKG 04/07/20: HR 79, Sr, borderline T wave abnormalities                     Anesthesia Plan    ASA 3     general   total IV anesthesia  (Patient understands anesthesia not responsible for dental damage.  )  intravenous induction     Anesthetic plan, risks, benefits, and alternatives have been provided, discussed and informed consent has been obtained with: patient.  Pre-procedure education provided  Plan discussed with CRNA.        CODE STATUS:

## 2024-07-09 ENCOUNTER — APPOINTMENT (OUTPATIENT)
Dept: ONCOLOGY | Facility: HOSPITAL | Age: 74
End: 2024-07-09
Payer: MEDICARE

## 2024-07-09 ENCOUNTER — HOSPITAL ENCOUNTER (OUTPATIENT)
Facility: HOSPITAL | Age: 74
Setting detail: HOSPITAL OUTPATIENT SURGERY
Discharge: HOME | End: 2024-07-09
Attending: SURGERY | Admitting: SURGERY
Payer: MEDICARE

## 2024-07-09 ENCOUNTER — ANESTHESIA (OUTPATIENT)
Dept: GASTROENTEROLOGY | Facility: HOSPITAL | Age: 74
End: 2024-07-09
Payer: MEDICARE

## 2024-07-09 VITALS
TEMPERATURE: 97 F | WEIGHT: 164.9 LBS | RESPIRATION RATE: 18 BRPM | HEART RATE: 76 BPM | BODY MASS INDEX: 30.15 KG/M2 | OXYGEN SATURATION: 93 % | DIASTOLIC BLOOD PRESSURE: 83 MMHG | SYSTOLIC BLOOD PRESSURE: 171 MMHG

## 2024-07-09 DIAGNOSIS — Z86.010 PERSONAL HISTORY OF COLONIC POLYPS: ICD-10-CM

## 2024-07-09 DIAGNOSIS — R10.84 GENERALIZED ABDOMINAL PAIN: ICD-10-CM

## 2024-07-09 LAB — GLUCOSE BLDC GLUCOMTR-MCNC: 92 MG/DL (ref 70–99)

## 2024-07-09 PROCEDURE — 25810000003 LACTATED RINGERS PER 1000 ML

## 2024-07-09 PROCEDURE — 25810000003 LACTATED RINGERS PER 1000 ML: Performed by: NURSE ANESTHETIST, CERTIFIED REGISTERED

## 2024-07-09 PROCEDURE — 82948 REAGENT STRIP/BLOOD GLUCOSE: CPT

## 2024-07-09 PROCEDURE — 88305 TISSUE EXAM BY PATHOLOGIST: CPT | Performed by: SURGERY

## 2024-07-09 PROCEDURE — 25010000002 PROPOFOL 10 MG/ML EMULSION: Performed by: NURSE ANESTHETIST, CERTIFIED REGISTERED

## 2024-07-09 RX ORDER — SODIUM CHLORIDE, SODIUM LACTATE, POTASSIUM CHLORIDE, CALCIUM CHLORIDE 600; 310; 30; 20 MG/100ML; MG/100ML; MG/100ML; MG/100ML
30 INJECTION, SOLUTION INTRAVENOUS CONTINUOUS
Status: DISCONTINUED | OUTPATIENT
Start: 2024-07-09 | End: 2024-07-09 | Stop reason: HOSPADM

## 2024-07-09 RX ORDER — LIDOCAINE HYDROCHLORIDE 20 MG/ML
INJECTION, SOLUTION EPIDURAL; INFILTRATION; INTRACAUDAL; PERINEURAL AS NEEDED
Status: DISCONTINUED | OUTPATIENT
Start: 2024-07-09 | End: 2024-07-09 | Stop reason: SURG

## 2024-07-09 RX ORDER — PROPOFOL 10 MG/ML
VIAL (ML) INTRAVENOUS AS NEEDED
Status: DISCONTINUED | OUTPATIENT
Start: 2024-07-09 | End: 2024-07-09 | Stop reason: SURG

## 2024-07-09 RX ORDER — SODIUM CHLORIDE, SODIUM LACTATE, POTASSIUM CHLORIDE, CALCIUM CHLORIDE 600; 310; 30; 20 MG/100ML; MG/100ML; MG/100ML; MG/100ML
INJECTION, SOLUTION INTRAVENOUS CONTINUOUS PRN
Status: DISCONTINUED | OUTPATIENT
Start: 2024-07-09 | End: 2024-07-09 | Stop reason: SURG

## 2024-07-09 RX ADMIN — SODIUM CHLORIDE, POTASSIUM CHLORIDE, SODIUM LACTATE AND CALCIUM CHLORIDE 30 ML/HR: 600; 310; 30; 20 INJECTION, SOLUTION INTRAVENOUS at 13:39

## 2024-07-09 RX ADMIN — SODIUM CHLORIDE, POTASSIUM CHLORIDE, SODIUM LACTATE AND CALCIUM CHLORIDE: 600; 310; 30; 20 INJECTION, SOLUTION INTRAVENOUS at 14:13

## 2024-07-09 RX ADMIN — PROPOFOL 165 MCG/KG/MIN: 10 INJECTION, EMULSION INTRAVENOUS at 14:20

## 2024-07-09 RX ADMIN — LIDOCAINE HYDROCHLORIDE 60 MG: 20 INJECTION, SOLUTION EPIDURAL; INFILTRATION; INTRACAUDAL; PERINEURAL at 14:20

## 2024-07-09 RX ADMIN — PROPOFOL 60 MG: 10 INJECTION, EMULSION INTRAVENOUS at 14:19

## 2024-07-09 NOTE — H&P
General Surgery/Colorectal Surgery Note    Patient Name:  Gini Edmondson  YOB: 1950  [id number]    Referring Provider: Dheeraj Cho, *      Patient Care Team:  Cheyenne Russell APRN as PCP - General (Nurse Practitioner)  Karan Moore MD as Consulting Physician (Hematology)    Subjective .     History of present illness:    HPI   referral from Cheyenne Pinto for colonoscopy consultation. Patient reports that she is having generalized abdominal pain. She denies any change in bowel habit or rectal bleeding. Denies any family history of colorectal cancer.   PH colorectal polyps    History:  Past Medical History:   Diagnosis Date    Allergy to sunlight     PER PT    Asthma     Cancer of left breast     INVASVIE LOBULAR CARCINOMA    Cardiac disorder     DR. MALCOLM ANTHONY    Condition not found     RIGHT EYE CATARACT (LT CATARACT REPAIRED)    Constipation     COPD (chronic obstructive pulmonary disease)     Heart attack     3-22-18 NON STEMI, CABG 3-23-18    Hx of bronchitis     SMOKES 5-7 CIGERETTS PER DAY    Hx of CABG     3V CABG 2018    Hypertension     MEDS CONTROL    Neuropathy     IN FEETH FROM DM    PAD (peripheral artery disease)     Raynaud's syndrome     Status post left breast lumpectomy        Past Surgical History:   Procedure Laterality Date    BREAST LUMPECTOMY      LEFT    CARDIAC CATHETERIZATION N/A 9/15/2022    Procedure: Aortogram with bilateral leg angiogram, possible angioplasty or stenting;  Surgeon: Jose Armando Mcbride MD;  Location: [facility] CATH INVASIVE LOCATION;  Service: Vascular;  Laterality: N/A;    COLONOSCOPY      CORONARY ARTERY BYPASS GRAFT      LAPAROSCOPIC TUBAL LIGATION      OTHER SURGICAL HISTORY      BILAT PAD LEGS    OTHER SURGICAL HISTORY      CHIP IN LEFT BREAST, MARKER FOR SPOT    OTHER SURGICAL HISTORY      LEFT KNEE REPAIR       Family History   Problem Relation Age of Onset    Stroke Mother     Prostate cancer Father     Diabetes Father      Pancreatic cancer Brother     Diabetes Brother     Breast cancer Maternal Aunt     Breast cancer Maternal Aunt     Breast cancer Paternal Aunt     Diabetes Other     Hypertension Other     Malig Hyperthermia Neg Hx        Social History     Tobacco Use    Smoking status: Every Day     Current packs/day: 0.50     Average packs/day: 0.5 packs/day for 54.0 years (27.0 ttl pk-yrs)     Types: Cigarettes     Start date: 7/7/1970    Smokeless tobacco: Never    Tobacco comments:     7-10 CIGARETTES     LAST SMOKED 5/2 0400   Vaping Use    Vaping status: Never Used   Substance Use Topics    Alcohol use: Not Currently    Drug use: Never       Review of Systems: See HPI    Review of Systems            Medications Prior to Admission   Medication Sig Dispense Refill Last Dose    Accu-Chek FastClix Lancets misc Inject 1 Device under the skin into the appropriate area as directed 2 (Two) Times a Day. as directed (Patient not taking: Reported on 3/13/2024)       Accu-Chek Guide test strip 1 each by Other route 3 (Three) Times a Day. as directed       albuterol sulfate  (90 Base) MCG/ACT inhaler Inhale 1 puff 2 (Two) Times a Day.       ASPIRIN 81 PO Take 1 tablet/day by mouth Daily.       atorvastatin (LIPITOR) 10 MG tablet Take 1 tablet by mouth Daily.       B-D ULTRAFINE III SHORT PEN 31G X 8 MM misc Inject 1 application under the skin into the appropriate area as directed 2 (Two) Times a Day.       Biotin 1000 MCG tablet Take 1 tablet by mouth Daily.       docusate sodium (COLACE) 100 MG capsule Take 1 tablet by mouth Take As Directed.       escitalopram (LEXAPRO) 10 MG tablet Take 1 tablet by mouth Daily.       insulin glargine (LANTUS, SEMGLEE) 100 UNIT/ML injection Inject 25 Units under the skin into the appropriate area as directed Every Night.       letrozole (FEMARA) 2.5 MG tablet Take 1 tablet by mouth Daily. 90 tablet 1     Magnesium 500 MG capsule Take 1 tablet by mouth As Needed.       metoprolol tartrate  (LOPRESSOR) 25 MG tablet Take 1 tablet by mouth 2 (Two) Times a Day.       montelukast (SINGULAIR) 10 MG tablet Take 1 tablet by mouth Every Night.       Omega-3 Fatty Acids (fish oil) 1000 MG capsule capsule Take 1 tablet by mouth Daily.       omeprazole (priLOSEC) 40 MG capsule TAKE 1 CAPSULE BY MOUTH EVERY EVENING AS NEEDED FOR ACID REFLUX       pentoxifylline (TRENtal) 400 MG CR tablet Take 1 tablet by mouth 2 (two) times a day.       pentoxifylline (TRENtal) 400 MG CR tablet Take 1 tablet by mouth 3 (Three) Times a Day.       Semaglutide,0.25 or 0.5MG/DOS, (Ozempic, 0.25 or 0.5 MG/DOSE,) 2 MG/1.5ML solution pen-injector Inject 0.25 mg under the skin into the appropriate area as directed 1 (One) Time Per Week.       senna (SENOKOT) 8.6 MG tablet Take 1 tablet by mouth Daily.       sodium-potassium-magnesium sulfates (SUPREP) 17.5-3.13-1.6 GM/177ML solution oral solution Take as directed 177 mL 0          Home Meds:      Prior to Admission medications    Medication Sig Start Date End Date Taking? Authorizing Provider   Accu-Chek FastClix Lancets misc Inject 1 Device under the skin into the appropriate area as directed 2 (Two) Times a Day. as directed  Patient not taking: Reported on 3/13/2024 6/1/21   Daljit Miller MD   Accu-Cheanum Guide test strip 1 each by Other route 3 (Three) Times a Day. as directed 6/1/21   Daljit Miller MD   albuterol sulfate  (90 Base) MCG/ACT inhaler Inhale 1 puff 2 (Two) Times a Day.    Daljit Miller MD   ASPIRIN 81 PO Take 1 tablet/day by mouth Daily.    Daljit Miller MD   atorvastatin (LIPITOR) 10 MG tablet Take 1 tablet by mouth Daily. 10/27/22   Daljit Miller MD B-D ULTRAFINE III SHORT PEN 31G X 8 MM misc Inject 1 application under the skin into the appropriate area as directed 2 (Two) Times a Day. 6/1/21   Daljit Miller MD   Biotin 1000 MCG tablet Take 1 tablet by mouth Daily.    Daljit Miller MD   docusate sodium  (COLACE) 100 MG capsule Take 1 tablet by mouth Take As Directed.    Daljit Miller MD   escitalopram (LEXAPRO) 10 MG tablet Take 1 tablet by mouth Daily. 5/18/21   Daljit Miller MD   insulin glargine (LANTUS, SEMGLEE) 100 UNIT/ML injection Inject 25 Units under the skin into the appropriate area as directed Every Night.    Daljit Miller MD   letrozole (FEMARA) 2.5 MG tablet Take 1 tablet by mouth Daily. 12/27/23   Karan Moore MD   Magnesium 500 MG capsule Take 1 tablet by mouth As Needed.    Daljit Miller MD   metoprolol tartrate (LOPRESSOR) 25 MG tablet Take 1 tablet by mouth 2 (Two) Times a Day.    Daljit Miller MD   montelukast (SINGULAIR) 10 MG tablet Take 1 tablet by mouth Every Night. 4/19/23   Daljit Miller MD   Omega-3 Fatty Acids (fish oil) 1000 MG capsule capsule Take 1 tablet by mouth Daily.    Daljit Miller MD   omeprazole (priLOSEC) 40 MG capsule TAKE 1 CAPSULE BY MOUTH EVERY EVENING AS NEEDED FOR ACID REFLUX 2/7/24   Daljit Miller MD   pentoxifylline (TRENtal) 400 MG CR tablet Take 1 tablet by mouth 2 (two) times a day.    Daljit Miller MD   pentoxifylline (TRENtal) 400 MG CR tablet Take 1 tablet by mouth 3 (Three) Times a Day. 10/27/22   Daljit Miller MD   Semaglutide,0.25 or 0.5MG/DOS, (Ozempic, 0.25 or 0.5 MG/DOSE,) 2 MG/1.5ML solution pen-injector Inject 0.25 mg under the skin into the appropriate area as directed 1 (One) Time Per Week.    Daljit Miller MD   senna (SENOKOT) 8.6 MG tablet Take 1 tablet by mouth Daily.    Daljit Miller MD   sodium-potassium-magnesium sulfates (SUPREP) 17.5-3.13-1.6 GM/177ML solution oral solution Take as directed 5/8/24   Dheeraj Cho MD            Allergies:  Cilostazol and Sulfa antibiotics      Objective     Vital Signs        Physical Exam:     Physical Exam    NAD, A/O x 4, normal circulation, normal respiration      Result Review :     Assessment & Plan      There are no diagnoses linked to this encounter.       Risks including bleeding, perforation, pain, infection, missed polyp(s). Benefits, and alternatives of Colonoscopy discussed with patient.  All questions answered.  Consent verified.         Dheeraj Cho MD  07/09/24 13:18 EDT

## 2024-07-09 NOTE — ANESTHESIA POSTPROCEDURE EVALUATION
Patient: Gini Edmondson    Procedure Summary       Date: 07/09/24 Room / Location: [facility] ENDOSCOPY 2 / [facility] ENDOSCOPY    Anesthesia Start: 1417 Anesthesia Stop: 1441    Procedure: COLONOSCOPY WITH BIOPSY POLYPECTOMY Diagnosis:       Generalized abdominal pain      Personal history of colonic polyps      (Generalized abdominal pain [R10.84])      (Personal history of colonic polyps [Z86.010])    Surgeons: Dheeraj Cho MD Provider: Roberto Carlos Chaudhry CRNA    Anesthesia Type: general ASA Status: 3            Anesthesia Type: general    Vitals  Vitals Value Taken Time   /72 07/09/24 1452   Temp 36.1 °C (97 °F) 07/09/24 1436   Pulse 75 07/09/24 1453   Resp 19 07/09/24 1436   SpO2 91 % 07/09/24 1453   Vitals shown include unfiled device data.        Post Anesthesia Care and Evaluation    Post-procedure mental status: acceptable.  Pain management: satisfactory to patient    Airway patency: patent  Anesthetic complications: No anesthetic complications    Cardiovascular status: acceptable  Respiratory status: acceptable    Comments: Per chart review

## 2024-07-11 LAB
CYTO UR: NORMAL
LAB AP CASE REPORT: NORMAL
LAB AP CLINICAL INFORMATION: NORMAL
PATH REPORT.FINAL DX SPEC: NORMAL
PATH REPORT.GROSS SPEC: NORMAL

## 2024-08-07 ENCOUNTER — OFFICE VISIT (OUTPATIENT)
Dept: SURGERY | Facility: CLINIC | Age: 74
End: 2024-08-07
Payer: MEDICARE

## 2024-08-07 VITALS
SYSTOLIC BLOOD PRESSURE: 165 MMHG | HEART RATE: 88 BPM | HEIGHT: 62 IN | WEIGHT: 160 LBS | BODY MASS INDEX: 29.44 KG/M2 | DIASTOLIC BLOOD PRESSURE: 72 MMHG

## 2024-08-07 DIAGNOSIS — Z98.890 S/P COLONOSCOPY WITH POLYPECTOMY: Primary | ICD-10-CM

## 2024-08-07 DIAGNOSIS — K63.5 HYPERPLASTIC COLONIC POLYP, UNSPECIFIED PART OF COLON: ICD-10-CM

## 2024-08-08 NOTE — PROGRESS NOTES
"Chief Complaint: Follow-up    Subjective      Follow-up visit       History of Present Illness  Gini Edmondson is a 74 y.o. female presents to [facility] GENERAL SURGERY for follow-up visit.    Patient presents today for follow-up visit after undergoing colonoscopy on 7/9/2024 performed by Dr. Dheeraj Cho.  Patient was with a sessile hyperplastic polyp of the rectum per colonoscopy/pathology.  Resection and retrieval were complete.    Results for orders placed or performed during the hospital encounter of 07/09/24   POC Glucose Once    Specimen: Blood   Result Value Ref Range    Glucose 92 70 - 99 mg/dL   Tissue Pathology Exam    Specimen: Large Intestine, Rectum; Polyp   Result Value Ref Range    Case Report       Surgical Pathology Report                         Case: [id number]                                  Authorizing Provider:  Dheeraj Cho MD  Collected:           07/09/2024 02:32 PM          Ordering Location:     [facility] Received:            07/10/2024 06:12 AM                                 SUITES                                                                       Pathologist:           Cheyenne Villanueva MD                                                     Specimen:    Large Intestine, Rectum, RECTAL POLYP                                                      Clinical Information       Generalized abdominal pain  Personal history of colonic polyps      Final Diagnosis       Rectum polyp, biopsy:   - Hyperplastic polyp        Gross Description       1. Large Intestine, Rectum.  Received in formalin and labeled \" rectal polyp\" is a 0.4 cm fragment of tan soft tissue. The specimen is entirely submitted in one cassette.   SUSY      Microscopic Description       Microscopic examination performed.       Colonoscopy is performed at difficulty.  The patient tolerated the procedure well.    Patient denies any postoperative complications  Objective "     Past Medical History:   Diagnosis Date    Allergy to sunlight     PER PT    Asthma     Cancer of left breast     INVASVIE LOBULAR CARCINOMA    Cardiac disorder     DR. MALCOLM ANTHONY    Condition not found     RIGHT EYE CATARACT (LT CATARACT REPAIRED)    Constipation     COPD (chronic obstructive pulmonary disease)     Diabetes mellitus     Elevated cholesterol     Heart attack     3-22-18 NON STEMI, CABG 3-23-18    Hx of bronchitis     SMOKES 5-7 CIGERETTS PER DAY    Hx of CABG     3V CABG 2018    Hypertension     MEDS CONTROL    Neuropathy     IN FEETH FROM DM    PAD (peripheral artery disease)     Raynaud's syndrome     Status post left breast lumpectomy        Past Surgical History:   Procedure Laterality Date    BREAST LUMPECTOMY      LEFT    CARDIAC CATHETERIZATION N/A 9/15/2022    Procedure: Aortogram with bilateral leg angiogram, possible angioplasty or stenting;  Surgeon: Jose Armando Mcbride MD;  [facility]: [facility] CATH INVASIVE LOCATION;  Service: Vascular;  Laterality: N/A;    COLONOSCOPY      COLONOSCOPY N/A 7/9/2024    Procedure: COLONOSCOPY WITH BIOPSY POLYPECTOMY;  Surgeon: Dheeraj Cho MD;  Location: [facility] ENDOSCOPY;  Service: General;  Laterality: N/A;  COLON POLYP, DIVERTICULOSIS    CORONARY ARTERY BYPASS GRAFT      LAPAROSCOPIC TUBAL LIGATION      OTHER SURGICAL HISTORY      BILAT PAD LEGS    OTHER SURGICAL HISTORY      CHIP IN LEFT BREAST, MARKER FOR SPOT    OTHER SURGICAL HISTORY      LEFT KNEE REPAIR       Outpatient Medications Marked as Taking for the 8/7/24 encounter (Office Visit) with Lupis Frank APRN   Medication Sig Dispense Refill    Accu-Chek FastClix Lancets misc Inject 1 Device under the skin into the appropriate area as directed 2 (Two) Times a Day. as directed      Accu-Chek Guide test strip 1 each by Other route 3 (Three) Times a Day. as directed      albuterol sulfate  (90 Base) MCG/ACT inhaler Inhale 1 puff 2 (Two) Times a Day.      ASPIRIN 81 PO Take 1 tablet/day  by mouth Daily.      atorvastatin (LIPITOR) 10 MG tablet Take 1 tablet by mouth Daily.      B-D ULTRAFINE III SHORT PEN 31G X 8 MM misc Inject 1 application under the skin into the appropriate area as directed 2 (Two) Times a Day.      Biotin 1000 MCG tablet Take 1 tablet by mouth Daily.      escitalopram (LEXAPRO) 10 MG tablet Take 1 tablet by mouth Daily.      insulin glargine (LANTUS, SEMGLEE) 100 UNIT/ML injection Inject 25 Units under the skin into the appropriate area as directed Every Night.      letrozole (FEMARA) 2.5 MG tablet Take 1 tablet by mouth Daily. 90 tablet 1    Magnesium 500 MG capsule Take 1 tablet by mouth As Needed.      metoprolol tartrate (LOPRESSOR) 25 MG tablet Take 1 tablet by mouth 2 (Two) Times a Day.      montelukast (SINGULAIR) 10 MG tablet Take 1 tablet by mouth Every Night.      Omega-3 Fatty Acids (fish oil) 1000 MG capsule capsule Take 1 tablet by mouth Daily.      omeprazole (priLOSEC) 40 MG capsule TAKE 1 CAPSULE BY MOUTH EVERY EVENING AS NEEDED FOR ACID REFLUX      pentoxifylline (TRENtal) 400 MG CR tablet Take 1 tablet by mouth 2 (two) times a day.      Semaglutide,0.25 or 0.5MG/DOS, (Ozempic, 0.25 or 0.5 MG/DOSE,) 2 MG/1.5ML solution pen-injector Inject 0.25 mg under the skin into the appropriate area as directed 1 (One) Time Per Week.      senna (SENOKOT) 8.6 MG tablet Take 1 tablet by mouth Daily.         Allergies   Allergen Reactions    Cilostazol Shortness Of Breath    Sulfa Antibiotics Hives        Family History   Problem Relation Age of Onset    Stroke Mother     Prostate cancer Father     Diabetes Father     Pancreatic cancer Brother     Diabetes Brother     Breast cancer Maternal Aunt     Breast cancer Maternal Aunt     Breast cancer Paternal Aunt     Diabetes Other     Hypertension Other     Malig Hyperthermia Neg Hx        Social History     Socioeconomic History    Marital status:    Tobacco Use    Smoking status: Every Day     Current packs/day: 0.50      "Average packs/day: 0.5 packs/day for 54.1 years (27.0 ttl pk-yrs)     Types: Cigarettes     Start date: 7/7/1970    Smokeless tobacco: Never    Tobacco comments:     7-10 CIGARETTES     LAST SMOKED 5/2 0400   Vaping Use    Vaping status: Never Used   Substance and Sexual Activity    Alcohol use: Not Currently    Drug use: Never    Sexual activity: Defer       Review of Systems   Constitutional:  Negative for chills and fever.   Gastrointestinal:  Negative for abdominal distention, abdominal pain, anal bleeding, blood in stool, constipation, diarrhea and rectal pain.        Vital Signs:   /72 (BP Location: Right arm, Patient Position: Sitting, Cuff Size: Adult)   Pulse 88   Ht 157.5 cm (62.01\")   Wt 72.6 kg (160 lb)   BMI 29.25 kg/m²      Physical Exam  Vitals and nursing note reviewed.   Constitutional:       General: She is not in acute distress.     Appearance: Normal appearance. She is not ill-appearing.   HENT:      Head: Normocephalic and atraumatic.   Cardiovascular:      Rate and Rhythm: Normal rate.   Pulmonary:      Effort: Pulmonary effort is normal.      Breath sounds: No stridor.   Abdominal:      Palpations: Abdomen is soft.      Tenderness: There is no guarding.   Musculoskeletal:         General: No deformity. Normal range of motion.   Skin:     General: Skin is warm and dry.      Coloration: Skin is not jaundiced.   Neurological:      General: No focal deficit present.      Mental Status: She is alert and oriented to person, place, and time.   Psychiatric:         Mood and Affect: Mood normal.         Thought Content: Thought content normal.          Result Review :          []  Laboratory  []  Radiology  []  Pathology  []  Microbiology  []  EKG/Telemetry   []  Cardiology/Vascular   []  Old records  Today I reviewed Dr. Cho's operative and pathology report.     Assessment and Plan    Diagnoses and all orders for this visit:    1. S/P colonoscopy with polypectomy (Primary)    2. " Hyperplastic colonic polyp, unspecified part of colon        Follow Up   Return for Rescreen colon in 5 years; follow-up in the interim as needed.    Avoid high fat diets    Increase fiber intake    Per AGA guidelines patient will follow-up for colonoscopy surveillance as directed.    Patient was given instructions and counseling regarding her condition or for health maintenance advice. Please see specific information pulled into the AVS if appropriate.     Thank you for allowing me to participate in the care of this patient. Please call with questions or concerns.

## 2024-08-21 ENCOUNTER — APPOINTMENT (OUTPATIENT)
Dept: ONCOLOGY | Facility: HOSPITAL | Age: 74
End: 2024-08-21
Payer: MEDICARE

## 2024-08-21 ENCOUNTER — HOSPITAL ENCOUNTER (OUTPATIENT)
Dept: MAMMOGRAPHY | Facility: HOSPITAL | Age: 74
Discharge: HOME | End: 2024-08-21
Payer: MEDICARE

## 2024-08-21 DIAGNOSIS — C50.512 MALIGNANT NEOPLASM OF LOWER-OUTER QUADRANT OF LEFT BREAST OF FEMALE, ESTROGEN RECEPTOR POSITIVE: ICD-10-CM

## 2024-08-21 DIAGNOSIS — Z17.0 MALIGNANT NEOPLASM OF LOWER-OUTER QUADRANT OF LEFT BREAST OF FEMALE, ESTROGEN RECEPTOR POSITIVE: ICD-10-CM

## 2024-08-21 DIAGNOSIS — Z12.31 ENCOUNTER FOR SCREENING MAMMOGRAM FOR MALIGNANT NEOPLASM OF BREAST: ICD-10-CM

## 2024-08-21 PROCEDURE — 77067 SCR MAMMO BI INCL CAD: CPT

## 2024-08-21 PROCEDURE — 77063 BREAST TOMOSYNTHESIS BI: CPT

## 2024-10-09 ENCOUNTER — TELEPHONE (OUTPATIENT)
Dept: ONCOLOGY | Facility: HOSPITAL | Age: 74
End: 2024-10-09
Payer: MEDICARE

## 2024-10-11 DIAGNOSIS — R94.8 ABNORMAL POSITRON EMISSION TOMOGRAPHY (PET) SCAN: Primary | ICD-10-CM

## 2024-10-12 DIAGNOSIS — Z17.0 MALIGNANT NEOPLASM OF LOWER-OUTER QUADRANT OF LEFT BREAST OF FEMALE, ESTROGEN RECEPTOR POSITIVE: ICD-10-CM

## 2024-10-12 DIAGNOSIS — C50.512 MALIGNANT NEOPLASM OF LOWER-OUTER QUADRANT OF LEFT BREAST OF FEMALE, ESTROGEN RECEPTOR POSITIVE: ICD-10-CM

## 2024-10-14 ENCOUNTER — TELEPHONE (OUTPATIENT)
Dept: ONCOLOGY | Facility: HOSPITAL | Age: 74
End: 2024-10-14
Payer: MEDICARE

## 2024-10-14 DIAGNOSIS — C50.512 MALIGNANT NEOPLASM OF LOWER-OUTER QUADRANT OF LEFT BREAST OF FEMALE, ESTROGEN RECEPTOR POSITIVE: ICD-10-CM

## 2024-10-14 DIAGNOSIS — Z17.0 MALIGNANT NEOPLASM OF LOWER-OUTER QUADRANT OF LEFT BREAST OF FEMALE, ESTROGEN RECEPTOR POSITIVE: ICD-10-CM

## 2024-10-14 DIAGNOSIS — R94.8 ABNORMAL POSITRON EMISSION TOMOGRAPHY (PET) SCAN: Primary | ICD-10-CM

## 2024-10-14 RX ORDER — LETROZOLE 2.5 MG/1
2.5 TABLET, FILM COATED ORAL DAILY
Qty: 90 TABLET | Refills: 0 | Status: SHIPPED | OUTPATIENT
Start: 2024-10-14

## 2024-10-14 NOTE — TELEPHONE ENCOUNTER
Attempted to reach patient to let her know that we have her scheduled for a CHEST CT on 11/12/24 at [facility] ordered by Dr. Moore.Patient did not answer and her vm box was full. PLEASE WARM TRANSFER IF PATIENT CALLS BACK.

## 2024-11-12 ENCOUNTER — HOSPITAL ENCOUNTER (OUTPATIENT)
Dept: CT IMAGING | Facility: HOSPITAL | Age: 74
Discharge: HOME | End: 2024-11-12
Admitting: INTERNAL MEDICINE
Payer: MEDICARE

## 2024-11-12 DIAGNOSIS — R94.8 ABNORMAL POSITRON EMISSION TOMOGRAPHY (PET) SCAN: ICD-10-CM

## 2024-11-12 LAB
CREAT BLDA-MCNC: 0.9 MG/DL (ref 0.6–1.3)
EGFRCR SERPLBLD CKD-EPI 2021: 66.8 ML/MIN/1.73

## 2024-11-12 PROCEDURE — 82565 ASSAY OF CREATININE: CPT

## 2024-11-12 PROCEDURE — 25510000001 IOPAMIDOL PER 1 ML: Performed by: INTERNAL MEDICINE

## 2024-11-12 PROCEDURE — 71260 CT THORAX DX C+: CPT

## 2024-11-12 RX ORDER — IOPAMIDOL 755 MG/ML
100 INJECTION, SOLUTION INTRAVASCULAR
Status: COMPLETED | OUTPATIENT
Start: 2024-11-12 | End: 2024-11-12

## 2024-11-12 RX ADMIN — IOPAMIDOL 100 ML: 755 INJECTION, SOLUTION INTRAVENOUS at 13:15

## 2024-11-13 ENCOUNTER — LAB (OUTPATIENT)
Dept: ONCOLOGY | Facility: HOSPITAL | Age: 74
End: 2024-11-13
Payer: MEDICARE

## 2024-11-13 DIAGNOSIS — Z17.0 MALIGNANT NEOPLASM OF LOWER-OUTER QUADRANT OF LEFT BREAST OF FEMALE, ESTROGEN RECEPTOR POSITIVE: ICD-10-CM

## 2024-11-13 DIAGNOSIS — Z78.0 POST-MENOPAUSAL: ICD-10-CM

## 2024-11-13 DIAGNOSIS — C50.512 MALIGNANT NEOPLASM OF LOWER-OUTER QUADRANT OF LEFT BREAST OF FEMALE, ESTROGEN RECEPTOR POSITIVE: ICD-10-CM

## 2024-11-13 LAB
25(OH)D3 SERPL-MCNC: 39.2 NG/ML (ref 30–100)
ALBUMIN SERPL-MCNC: 4.5 G/DL (ref 3.5–5.2)
ALBUMIN/GLOB SERPL: 1.3 G/DL
ALP SERPL-CCNC: 91 U/L (ref 39–117)
ALT SERPL W P-5'-P-CCNC: 19 U/L (ref 1–33)
ANION GAP SERPL CALCULATED.3IONS-SCNC: 8 MMOL/L (ref 5–15)
AST SERPL-CCNC: 22 U/L (ref 1–32)
BASOPHILS # BLD AUTO: 0.04 10*3/MM3 (ref 0–0.2)
BASOPHILS NFR BLD AUTO: 0.5 % (ref 0–1.5)
BILIRUB SERPL-MCNC: 0.5 MG/DL (ref 0–1.2)
BUN SERPL-MCNC: 6 MG/DL (ref 8–23)
BUN/CREAT SERPL: 7.7 (ref 7–25)
CALCIUM SPEC-SCNC: 10.1 MG/DL (ref 8.6–10.5)
CHLORIDE SERPL-SCNC: 99 MMOL/L (ref 98–107)
CO2 SERPL-SCNC: 29 MMOL/L (ref 22–29)
CREAT SERPL-MCNC: 0.78 MG/DL (ref 0.57–1)
DEPRECATED RDW RBC AUTO: 44 FL (ref 37–54)
EGFRCR SERPLBLD CKD-EPI 2021: 79.3 ML/MIN/1.73
EOSINOPHIL # BLD AUTO: 0.05 10*3/MM3 (ref 0–0.4)
EOSINOPHIL NFR BLD AUTO: 0.6 % (ref 0.3–6.2)
ERYTHROCYTE [DISTWIDTH] IN BLOOD BY AUTOMATED COUNT: 13.3 % (ref 12.3–15.4)
GLOBULIN UR ELPH-MCNC: 3.4 GM/DL
GLUCOSE SERPL-MCNC: 223 MG/DL (ref 65–99)
HCT VFR BLD AUTO: 49.4 % (ref 34–46.6)
HGB BLD-MCNC: 15.9 G/DL (ref 12–15.9)
IMM GRANULOCYTES # BLD AUTO: 0.01 10*3/MM3 (ref 0–0.05)
IMM GRANULOCYTES NFR BLD AUTO: 0.1 % (ref 0–0.5)
LYMPHOCYTES # BLD AUTO: 3.41 10*3/MM3 (ref 0.7–3.1)
LYMPHOCYTES NFR BLD AUTO: 43.6 % (ref 19.6–45.3)
MCH RBC QN AUTO: 28.8 PG (ref 26.6–33)
MCHC RBC AUTO-ENTMCNC: 32.2 G/DL (ref 31.5–35.7)
MCV RBC AUTO: 89.3 FL (ref 79–97)
MONOCYTES # BLD AUTO: 0.52 10*3/MM3 (ref 0.1–0.9)
MONOCYTES NFR BLD AUTO: 6.6 % (ref 5–12)
NEUTROPHILS NFR BLD AUTO: 3.79 10*3/MM3 (ref 1.7–7)
NEUTROPHILS NFR BLD AUTO: 48.6 % (ref 42.7–76)
NRBC BLD AUTO-RTO: 0 /100 WBC (ref 0–0.2)
PLATELET # BLD AUTO: 285 10*3/MM3 (ref 140–450)
PMV BLD AUTO: 10.3 FL (ref 6–12)
POTASSIUM SERPL-SCNC: 4.6 MMOL/L (ref 3.5–5.2)
PROT SERPL-MCNC: 7.9 G/DL (ref 6–8.5)
RBC # BLD AUTO: 5.53 10*6/MM3 (ref 3.77–5.28)
SODIUM SERPL-SCNC: 136 MMOL/L (ref 136–145)
WBC NRBC COR # BLD AUTO: 7.82 10*3/MM3 (ref 3.4–10.8)

## 2024-11-13 PROCEDURE — 80053 COMPREHEN METABOLIC PANEL: CPT

## 2024-11-13 PROCEDURE — 85025 COMPLETE CBC W/AUTO DIFF WBC: CPT

## 2024-11-13 PROCEDURE — 36415 COLL VENOUS BLD VENIPUNCTURE: CPT

## 2024-11-13 PROCEDURE — 82306 VITAMIN D 25 HYDROXY: CPT

## 2024-11-14 ENCOUNTER — OFFICE VISIT (OUTPATIENT)
Dept: ONCOLOGY | Facility: HOSPITAL | Age: 74
End: 2024-11-14
Payer: MEDICARE

## 2024-11-14 VITALS
TEMPERATURE: 97.2 F | BODY MASS INDEX: 26.76 KG/M2 | RESPIRATION RATE: 18 BRPM | HEART RATE: 85 BPM | WEIGHT: 145.4 LBS | SYSTOLIC BLOOD PRESSURE: 149 MMHG | OXYGEN SATURATION: 98 % | HEIGHT: 62 IN | DIASTOLIC BLOOD PRESSURE: 71 MMHG

## 2024-11-14 DIAGNOSIS — C50.512 MALIGNANT NEOPLASM OF LOWER-OUTER QUADRANT OF LEFT BREAST OF FEMALE, ESTROGEN RECEPTOR POSITIVE: ICD-10-CM

## 2024-11-14 DIAGNOSIS — Z17.0 MALIGNANT NEOPLASM OF LOWER-OUTER QUADRANT OF LEFT BREAST OF FEMALE, ESTROGEN RECEPTOR POSITIVE: ICD-10-CM

## 2024-11-14 DIAGNOSIS — R94.8 ABNORMAL POSITRON EMISSION TOMOGRAPHY (PET) SCAN: Primary | ICD-10-CM

## 2024-11-14 RX ORDER — GABAPENTIN 100 MG/1
100 CAPSULE ORAL 3 TIMES DAILY
COMMUNITY
Start: 2024-09-18

## 2024-11-14 NOTE — PROGRESS NOTES
Chief Complaint/Care Team   Malignant neoplasm of lower-outer quadrant of left breast o    Cheyenne Russell, *  Cheyenne Russell, APRN    History of Present Illness     Diagnosis: Left Breast Cancer: Invasive Lobular Carcinoma, Left UOQ, 1.3 cm; Grade 2, ER: 100%, KY: 10%, HER2: 0, Ki-67: 30%; Oncotype DX: 30    Current Treatment: Letrozole 2.5 mg PO QD  Previous Treatment:     Treatment History:  - s/p LEFT lumpectomy and SLND (3/27/20)  - s/p re-excision (+) margin (1 mm residual dx found) (4/7/20)  - completed adjuvant XRT L breast: 4256 cGy (5/14-6/5/20)  - Started Femara May 2020  - DEXA August 2020-normal bone density  - DEXA 11/2022 normal density in lumbar spine and decrease density of femoral neck.  -Patient previously under the care Dr. Dawkins, transitioned care to Dr. Moore on 12/27/2023.    Gini Edmondson is a 74 y.o. female who presents to [facility] HEMATOLOGY & ONCOLOGY for follow-up regarding left breast cancer, patient can resume treatment with letrozole.  Patient again reports hot flashes are manageable.  Patient without any new breast concerns or adenopathy.  Patient reports compliance letrozole and denies missing any doses.  Patient underwent CT chest on November 12, 2024 here to discuss those results.  She continues to smoke cigarettes.  She reports report loss of her  recently and is grieving his loss.     Review of Systems   Constitutional:  Positive for fatigue.   Genitourinary:  Positive for breast pain (Random pinch feeling).        Oncology/Hematology History Overview Note   Breast Cancer: Invasive Lobular Carcinoma, Left UOQ, 1.3 cm; Grade 2, ER: 100%, KY: 10%, HER2: 0, Ki-67: 30%; Oncotype DX: 30    Treatment History:  - s/p LEFT lumpectomy and SLND (3/27/20)  - s/p re-excision (+) margin (1 mm residual dx found) (4/7/20)  - completed adjuvant XRT L breast: 4256 cGy (5/14-6/5/20)  - Started Femara May 2020  - DEXA August 2020-normal bone density    "  Malignant neoplasm of lower-outer quadrant of left breast of female, estrogen receptor positive   2/20/2020 Surgery    Surgery       Procedure:  Ultrasound guided core needle biopsy      Location:  1:00 o'clock position      Invasive lobular carcinoma  ER/WY positive   Her 2 ted negative  Ki-67 30%     3/7/2020 Cancer Staged    Staging form: Breast, AJCC 8th Edition  - Pathologic stage from 3/7/2020: Stage IA (pT1, pN0, cM0, G2, ER+, WY+, HER2-, Oncotype DX score: 30) - Signed by Chanel Almonte APRN on 7/7/2021     3/27/2020 Initial Diagnosis    Malignant neoplasm of left breast in female, estrogen receptor positive (CMS/HCC)     3/27/2020 Surgery    Surgery       Procedure:  Left breast lumpectomy with SLNB          Invasive lobular carcinoma, 13 mm, ER/WY positive, her 2 ted negative, 2/2 SLN negative, positive superior medial margin       5/14/2020 - 6/5/2020 Radiation    Radiation OncologyTreatment Course:  Gini Edmondson received 4256 cGy in 16 fractions to left breast     7/7/2020 Surgery    Surgery       Procedure:  Reexcision of positive superior margin       Pathology revealed ILC single minute focus with negative margins.          Objective     Vitals:    11/14/24 1054   BP: 149/71   Pulse: 85   Resp: 18   Temp: 97.2 °F (36.2 °C)   TempSrc: Temporal   SpO2: 98%   Weight: 66 kg (145 lb 6.4 oz)   Height: 157.5 cm (62.01\")   PainSc: 0-No pain       ECOG score: 0         PHQ-9 Total Score:         Physical Exam  Vitals reviewed. Exam conducted with a chaperone present.   Constitutional:       General: She is not in acute distress.     Appearance: Normal appearance.   HENT:      Head: Normocephalic and atraumatic.   Eyes:      Extraocular Movements: Extraocular movements intact.      Conjunctiva/sclera: Conjunctivae normal.   Pulmonary:      Effort: Pulmonary effort is normal.   Musculoskeletal:      Cervical back: Normal range of motion and neck supple.   Skin:     General: Skin is warm and dry.    "   Findings: No bruising.   Neurological:      Mental Status: She is oriented to person, place, and time.           Past Medical History     Past Medical History:   Diagnosis Date    Allergy to sunlight     PER PT    Asthma     Cancer of left breast     INVASVIE LOBULAR CARCINOMA    Cardiac disorder     DR. MALCOLM ANTHONY    Condition not found     RIGHT EYE CATARACT (LT CATARACT REPAIRED)    Constipation     COPD (chronic obstructive pulmonary disease)     Diabetes mellitus     Elevated cholesterol     Heart attack     3-22-18 NON STEMI, CABG 3-23-18    Hx of bronchitis     SMOKES 5-7 CIGERETTS PER DAY    Hx of CABG     3V CABG 2018    Hypertension     MEDS CONTROL    Neuropathy     IN FEETH FROM DM    PAD (peripheral artery disease)     Raynaud's syndrome     Status post left breast lumpectomy      Current Outpatient Medications on File Prior to Visit   Medication Sig Dispense Refill    albuterol sulfate  (90 Base) MCG/ACT inhaler Inhale 1 puff 2 (Two) Times a Day.      ASPIRIN 81 PO Take 1 tablet/day by mouth Daily.      atorvastatin (LIPITOR) 10 MG tablet Take 1 tablet by mouth Daily.      Biotin 1000 MCG tablet Take 1 tablet by mouth Daily.      gabapentin (NEURONTIN) 100 MG capsule Take 1 capsule by mouth 3 (Three) Times a Day.      insulin glargine (LANTUS, SEMGLEE) 100 UNIT/ML injection Inject 25 Units under the skin into the appropriate area as directed Every Night.      letrozole (FEMARA) 2.5 MG tablet TAKE 1 TABLET BY MOUTH DAILY 90 tablet 0    Magnesium 500 MG capsule Take 1 tablet by mouth As Needed.      metoprolol tartrate (LOPRESSOR) 25 MG tablet Take 1 tablet by mouth 2 (Two) Times a Day.      montelukast (SINGULAIR) 10 MG tablet Take 1 tablet by mouth Every Night.      Omega-3 Fatty Acids (fish oil) 1000 MG capsule capsule Take 1 tablet by mouth Daily.      omeprazole (priLOSEC) 40 MG capsule TAKE 1 CAPSULE BY MOUTH EVERY EVENING AS NEEDED FOR ACID REFLUX      pentoxifylline (TRENtal) 400 MG CR  tablet Take 1 tablet by mouth 2 (two) times a day.      Semaglutide,0.25 or 0.5MG/DOS, (Ozempic, 0.25 or 0.5 MG/DOSE,) 2 MG/1.5ML solution pen-injector Inject 0.25 mg under the skin into the appropriate area as directed 1 (One) Time Per Week.      senna (SENOKOT) 8.6 MG tablet Take 1 tablet by mouth Daily.      Accu-Chek FastClix Lancets misc Inject 1 Device under the skin into the appropriate area as directed 2 (Two) Times a Day. as directed (Patient not taking: Reported on 11/14/2024)      Accu-Chek Guide test strip 1 each by Other route 3 (Three) Times a Day. as directed (Patient not taking: Reported on 11/14/2024)      B-D ULTRAFINE III SHORT PEN 31G X 8 MM misc Inject 1 application under the skin into the appropriate area as directed 2 (Two) Times a Day. (Patient not taking: Reported on 11/14/2024)      docusate sodium (COLACE) 100 MG capsule Take 1 tablet by mouth Take As Directed. (Patient not taking: Reported on 11/14/2024)      escitalopram (LEXAPRO) 10 MG tablet Take 1 tablet by mouth Daily. (Patient not taking: Reported on 11/14/2024)      pentoxifylline (TRENtal) 400 MG CR tablet Take 1 tablet by mouth 3 (Three) Times a Day. (Patient not taking: Reported on 11/14/2024)       No current facility-administered medications on file prior to visit.      Allergies   Allergen Reactions    Cilostazol Shortness Of Breath    Sulfa Antibiotics Hives     Past Surgical History:   Procedure Laterality Date    BREAST LUMPECTOMY      LEFT    CARDIAC CATHETERIZATION N/A 9/15/2022    Procedure: Aortogram with bilateral leg angiogram, possible angioplasty or stenting;  Surgeon: Jose Armando Mcbride MD;  Location: [facility] CATH INVASIVE LOCATION;  Service: Vascular;  Laterality: N/A;    COLONOSCOPY      COLONOSCOPY N/A 7/9/2024    Procedure: COLONOSCOPY WITH BIOPSY POLYPECTOMY;  Surgeon: Dheeraj Cho MD;  Location: [facility] ENDOSCOPY;  Service: General;  Laterality: N/A;  COLON POLYP, DIVERTICULOSIS    CORONARY ARTERY  BYPASS GRAFT      LAPAROSCOPIC TUBAL LIGATION      OTHER SURGICAL HISTORY      BILAT PAD LEGS    OTHER SURGICAL HISTORY      CHIP IN LEFT BREAST, MARKER FOR SPOT    OTHER SURGICAL HISTORY      LEFT KNEE REPAIR     Social History     Socioeconomic History    Marital status:    Tobacco Use    Smoking status: Every Day     Current packs/day: 0.50     Average packs/day: 0.5 packs/day for 54.4 years (27.2 ttl pk-yrs)     Types: Cigarettes     Start date: 7/7/1970    Smokeless tobacco: Never    Tobacco comments:     7-10 CIGARETTES     LAST SMOKED 5/2 0400   Vaping Use    Vaping status: Never Used   Substance and Sexual Activity    Alcohol use: Not Currently    Drug use: Never    Sexual activity: Defer     Family History   Problem Relation Age of Onset    Stroke Mother     Prostate cancer Father     Diabetes Father     Pancreatic cancer Brother     Diabetes Brother     Breast cancer Maternal Aunt     Breast cancer Maternal Aunt     Breast cancer Paternal Aunt     Diabetes Other     Hypertension Other     Malig Hyperthermia Neg Hx        Results     Result Review   The following data was reviewed by: Karan Moore MD on 11/29/2023:  Lab Results   Component Value Date    HGB 15.9 11/13/2024    HCT 49.4 (H) 11/13/2024    MCV 89.3 11/13/2024     11/13/2024    WBC 7.82 11/13/2024    NEUTROABS 3.79 11/13/2024    LYMPHSABS 3.41 (H) 11/13/2024    MONOSABS 0.52 11/13/2024    EOSABS 0.05 11/13/2024    BASOSABS 0.04 11/13/2024     Lab Results   Component Value Date    GLUCOSE 223 (H) 11/13/2024    BUN 6 (L) 11/13/2024    CREATININE 0.78 11/13/2024     11/13/2024    K 4.6 11/13/2024    CL 99 11/13/2024    CO2 29.0 11/13/2024    CALCIUM 10.1 11/13/2024    PROTEINTOT 7.9 11/13/2024    ALBUMIN 4.5 11/13/2024    BILITOT 0.5 11/13/2024    ALKPHOS 91 11/13/2024    AST 22 11/13/2024    ALT 19 11/13/2024     Lab Results   Component Value Date    FREET4 0.97 04/20/2023    TSH 0.858 10/23/2020           No radiology results  for the last day  CT Chest With Contrast Diagnostic    Result Date: 11/14/2024  Impression: 1. Stable right upper lobe nodule 2. Pulmonary emphysema. Recommend consideration for CT lung cancer screening 3. Pulmonary fibrosis 4. Hepatic steatosis 5. Postoperative changes of the left breast 6. Diffuse multinodular goiter Electronically Signed: Pankaj Carrera  11/14/2024 7:50 AM EST  Workstation ID: [id number]     Assessment & Plan     Diagnoses and all orders for this visit:    1. Abnormal positron emission tomography (PET) scan (Primary)  -     NM PET/CT Skull Base to Mid Thigh; Future    2. Malignant neoplasm of lower-outer quadrant of left breast of female, estrogen receptor positive  -     NM PET/CT Skull Base to Mid Thigh; Future  -     CBC & Differential; Future  -     Comprehensive Metabolic Panel; Future          Gini Edmondson is a 74 y.o. female who presents to [facility] HEMATOLOGY [facility] ONCOLOGY for follow-up regarding left HR positive breast cancer, currently on letrozole.  Patient is tolerating this medication well, most recent CBC and CMP, no evidence of toxicity on those labs.  Recommend continuing letrozole.    -Plan to obtain BCI at year 4 of endocrine therapy to determine if she benefits from extended endocrine therapy.    Last mammo August 21, 2024, BI-RADS 2 benign, due again August 2025, last DEXA 11/9/22 (normal) due  again in November 2024, patient missed this appointment due to the death of her , rescheduled this imaging test.    -CT chest from 11/14/2024 revealed stable right upper lobe nodule, pulmonary for Zima, pulmonary fibrosis, hepatic steatosis, diffuse multinodular goiter, but no axillary adenopathy appreciatedhas been lesions, will repeat CT chest low-dose in 1 year i.e. November 2025  -Given report of axillary lymphadenopathy seen on previous PET/CT,  we will repeat PET CT scan in 3 months    Plan patient follow-up in 3 months with CBC, CMP, with PET CT scan  and DEXA scan results.     Please note that portions of this note were completed with a voice recognition program.    Electronically signed by Karan Moore MD, 11/14/24, 4:53 PM EST.      Follow Up     I spent 30 minutes caring for Gini on this date of service. This time includes time spent by me in the following activities:preparing for the visit, reviewing tests, obtaining and/or reviewing a separately obtained history, performing a medically appropriate examination and/or evaluation , counseling and educating the patient/family/caregiver, ordering medications, tests, or procedures, referring and communicating with other health care professionals , documenting information in the medical record, independently interpreting results and communicating that information with the patient/family/caregiver, and care coordination.    This is an acute or chronic illness that poses a threat to life or bodily function. The above treatment plan involves a high risk of complications and/or mortality of patient management.    The patient was seen and examined. Work by the provider also included review and/or ordering of lab tests, review and/or ordering of radiology tests, review and/or ordering of medicine tests, discussion with other physicians or providers, independent review of data, obtaining old records, review/summation of old records, and/or other review.    I have reviewed the family history, social history, and past medical history for this patient. Previous information and data has been reviewed and updated as needed. I have reviewed and verified the chief complaint, history, and other documentation. The patient was interviewed and examined in the clinic and the chart reviewed. The previous observations, recommendations, and conclusions were reviewed including those of other providers.     The plan was discussed with the patient and/or family. The patient was given time to ask questions and these questions were  answered. At the conclusion of their visit they had no additional questions or concerns and all questions were answered to their satisfaction.    Patient was given instructions and counseling regarding her condition or for health maintenance advice. Please see specific information pulled into the AVS if appropriate.

## 2025-01-11 ENCOUNTER — APPOINTMENT (OUTPATIENT)
Dept: GENERAL RADIOLOGY | Facility: HOSPITAL | Age: 75
End: 2025-01-11
Payer: MEDICARE

## 2025-01-11 ENCOUNTER — HOSPITAL ENCOUNTER (INPATIENT)
Facility: HOSPITAL | Age: 75
LOS: 4 days | Discharge: HOME | End: 2025-01-15
Attending: EMERGENCY MEDICINE | Admitting: INTERNAL MEDICINE
Payer: MEDICARE

## 2025-01-11 DIAGNOSIS — J96.11 CHRONIC RESPIRATORY FAILURE WITH HYPOXIA: ICD-10-CM

## 2025-01-11 DIAGNOSIS — J44.9 CHRONIC OBSTRUCTIVE PULMONARY DISEASE, UNSPECIFIED COPD TYPE: ICD-10-CM

## 2025-01-11 DIAGNOSIS — E11.65 UNCONTROLLED TYPE 2 DIABETES MELLITUS WITH HYPERGLYCEMIA: ICD-10-CM

## 2025-01-11 DIAGNOSIS — J18.9 PNEUMONIA OF RIGHT UPPER LOBE DUE TO INFECTIOUS ORGANISM: Primary | ICD-10-CM

## 2025-01-11 DIAGNOSIS — R13.10 DYSPHAGIA, UNSPECIFIED TYPE: ICD-10-CM

## 2025-01-11 DIAGNOSIS — Z78.9 DECREASED ACTIVITIES OF DAILY LIVING (ADL): ICD-10-CM

## 2025-01-11 LAB
ALBUMIN SERPL-MCNC: 3.4 G/DL (ref 3.5–5.2)
ALBUMIN/GLOB SERPL: 0.8 G/DL
ALP SERPL-CCNC: 102 U/L (ref 39–117)
ALT SERPL W P-5'-P-CCNC: 9 U/L (ref 1–33)
ANION GAP SERPL CALCULATED.3IONS-SCNC: 13 MMOL/L (ref 5–15)
AST SERPL-CCNC: 20 U/L (ref 1–32)
BASOPHILS # BLD AUTO: 0.04 10*3/MM3 (ref 0–0.2)
BASOPHILS NFR BLD AUTO: 0.3 % (ref 0–1.5)
BILIRUB SERPL-MCNC: 0.5 MG/DL (ref 0–1.2)
BUN SERPL-MCNC: 15 MG/DL (ref 8–23)
BUN/CREAT SERPL: 20 (ref 7–25)
CALCIUM SPEC-SCNC: 9.7 MG/DL (ref 8.6–10.5)
CHLORIDE SERPL-SCNC: 93 MMOL/L (ref 98–107)
CO2 SERPL-SCNC: 26 MMOL/L (ref 22–29)
CREAT SERPL-MCNC: 0.75 MG/DL (ref 0.57–1)
D-LACTATE SERPL-SCNC: 1.8 MMOL/L (ref 0.5–2)
D-LACTATE SERPL-SCNC: 2.3 MMOL/L (ref 0.5–2)
DEPRECATED RDW RBC AUTO: 41.9 FL (ref 37–54)
EGFRCR SERPLBLD CKD-EPI 2021: 83.1 ML/MIN/1.73
EOSINOPHIL # BLD AUTO: 0.31 10*3/MM3 (ref 0–0.4)
EOSINOPHIL NFR BLD AUTO: 2.3 % (ref 0.3–6.2)
ERYTHROCYTE [DISTWIDTH] IN BLOOD BY AUTOMATED COUNT: 13.2 % (ref 12.3–15.4)
FLUAV SUBTYP SPEC NAA+PROBE: NOT DETECTED
FLUBV RNA ISLT QL NAA+PROBE: NOT DETECTED
GEN 5 1HR TROPONIN T REFLEX: 14 NG/L
GLOBULIN UR ELPH-MCNC: 4.1 GM/DL
GLUCOSE BLDC GLUCOMTR-MCNC: 291 MG/DL (ref 70–99)
GLUCOSE SERPL-MCNC: 256 MG/DL (ref 65–99)
HCT VFR BLD AUTO: 40.2 % (ref 34–46.6)
HGB BLD-MCNC: 12.7 G/DL (ref 12–15.9)
HOLD SPECIMEN: NORMAL
HOLD SPECIMEN: NORMAL
IMM GRANULOCYTES # BLD AUTO: 0.08 10*3/MM3 (ref 0–0.05)
IMM GRANULOCYTES NFR BLD AUTO: 0.6 % (ref 0–0.5)
LYMPHOCYTES # BLD AUTO: 1.57 10*3/MM3 (ref 0.7–3.1)
LYMPHOCYTES NFR BLD AUTO: 11.6 % (ref 19.6–45.3)
M PNEUMO IGM SER QL: NEGATIVE
MCH RBC QN AUTO: 27.5 PG (ref 26.6–33)
MCHC RBC AUTO-ENTMCNC: 31.6 G/DL (ref 31.5–35.7)
MCV RBC AUTO: 87 FL (ref 79–97)
MONOCYTES # BLD AUTO: 1.88 10*3/MM3 (ref 0.1–0.9)
MONOCYTES NFR BLD AUTO: 13.8 % (ref 5–12)
NEUTROPHILS NFR BLD AUTO: 71.4 % (ref 42.7–76)
NEUTROPHILS NFR BLD AUTO: 9.71 10*3/MM3 (ref 1.7–7)
NRBC BLD AUTO-RTO: 0 /100 WBC (ref 0–0.2)
NT-PROBNP SERPL-MCNC: 513.3 PG/ML (ref 0–1800)
PLATELET # BLD AUTO: 333 10*3/MM3 (ref 140–450)
PMV BLD AUTO: 10.1 FL (ref 6–12)
POTASSIUM SERPL-SCNC: 3.9 MMOL/L (ref 3.5–5.2)
PROCALCITONIN SERPL-MCNC: 0.76 NG/ML (ref 0–0.25)
PROT SERPL-MCNC: 7.5 G/DL (ref 6–8.5)
QT INTERVAL: 353 MS
QTC INTERVAL: 456 MS
RBC # BLD AUTO: 4.62 10*6/MM3 (ref 3.77–5.28)
RSV RNA NPH QL NAA+NON-PROBE: NOT DETECTED
S PYO AG THROAT QL: NEGATIVE
SARS-COV-2 RNA RESP QL NAA+PROBE: NOT DETECTED
SODIUM SERPL-SCNC: 132 MMOL/L (ref 136–145)
TROPONIN T % DELTA: -26 %
TROPONIN T NUMERIC DELTA: -5 NG/L
TROPONIN T SERPL HS-MCNC: 19 NG/L
WBC NRBC COR # BLD AUTO: 13.59 10*3/MM3 (ref 3.4–10.8)
WHOLE BLOOD HOLD COAG: NORMAL
WHOLE BLOOD HOLD SPECIMEN: NORMAL

## 2025-01-11 PROCEDURE — 87040 BLOOD CULTURE FOR BACTERIA: CPT | Performed by: EMERGENCY MEDICINE

## 2025-01-11 PROCEDURE — 87070 CULTURE OTHR SPECIMN AEROBIC: CPT | Performed by: INTERNAL MEDICINE

## 2025-01-11 PROCEDURE — 99223 1ST HOSP IP/OBS HIGH 75: CPT | Performed by: INTERNAL MEDICINE

## 2025-01-11 PROCEDURE — 25010000002 CEFTRIAXONE PER 250 MG: Performed by: EMERGENCY MEDICINE

## 2025-01-11 PROCEDURE — 94799 UNLISTED PULMONARY SVC/PX: CPT

## 2025-01-11 PROCEDURE — 84484 ASSAY OF TROPONIN QUANT: CPT | Performed by: EMERGENCY MEDICINE

## 2025-01-11 PROCEDURE — 63710000001 INSULIN LISPRO (HUMAN) PER 5 UNITS: Performed by: INTERNAL MEDICINE

## 2025-01-11 PROCEDURE — 94640 AIRWAY INHALATION TREATMENT: CPT

## 2025-01-11 PROCEDURE — 87880 STREP A ASSAY W/OPTIC: CPT | Performed by: EMERGENCY MEDICINE

## 2025-01-11 PROCEDURE — 36415 COLL VENOUS BLD VENIPUNCTURE: CPT | Performed by: EMERGENCY MEDICINE

## 2025-01-11 PROCEDURE — 80053 COMPREHEN METABOLIC PANEL: CPT | Performed by: EMERGENCY MEDICINE

## 2025-01-11 PROCEDURE — 87081 CULTURE SCREEN ONLY: CPT | Performed by: EMERGENCY MEDICINE

## 2025-01-11 PROCEDURE — 87637 SARSCOV2&INF A&B&RSV AMP PRB: CPT | Performed by: EMERGENCY MEDICINE

## 2025-01-11 PROCEDURE — 94760 N-INVAS EAR/PLS OXIMETRY 1: CPT

## 2025-01-11 PROCEDURE — 87205 SMEAR GRAM STAIN: CPT | Performed by: INTERNAL MEDICINE

## 2025-01-11 PROCEDURE — 25010000002 AZITHROMYCIN PER 500 MG: Performed by: EMERGENCY MEDICINE

## 2025-01-11 PROCEDURE — 71045 X-RAY EXAM CHEST 1 VIEW: CPT

## 2025-01-11 PROCEDURE — 94761 N-INVAS EAR/PLS OXIMETRY MLT: CPT

## 2025-01-11 PROCEDURE — 84145 PROCALCITONIN (PCT): CPT | Performed by: INTERNAL MEDICINE

## 2025-01-11 PROCEDURE — 99285 EMERGENCY DEPT VISIT HI MDM: CPT

## 2025-01-11 PROCEDURE — 93005 ELECTROCARDIOGRAM TRACING: CPT | Performed by: EMERGENCY MEDICINE

## 2025-01-11 PROCEDURE — 83605 ASSAY OF LACTIC ACID: CPT | Performed by: EMERGENCY MEDICINE

## 2025-01-11 PROCEDURE — 83880 ASSAY OF NATRIURETIC PEPTIDE: CPT | Performed by: EMERGENCY MEDICINE

## 2025-01-11 PROCEDURE — 86738 MYCOPLASMA ANTIBODY: CPT | Performed by: INTERNAL MEDICINE

## 2025-01-11 PROCEDURE — 85025 COMPLETE CBC W/AUTO DIFF WBC: CPT | Performed by: EMERGENCY MEDICINE

## 2025-01-11 PROCEDURE — 25810000003 SODIUM CHLORIDE 0.9 % SOLUTION 250 ML FLEX CONT: Performed by: EMERGENCY MEDICINE

## 2025-01-11 PROCEDURE — 25010000002 METHYLPREDNISOLONE PER 125 MG: Performed by: EMERGENCY MEDICINE

## 2025-01-11 PROCEDURE — 82948 REAGENT STRIP/BLOOD GLUCOSE: CPT

## 2025-01-11 RX ORDER — PREDNISONE 20 MG/1
40 TABLET ORAL
Status: DISCONTINUED | OUTPATIENT
Start: 2025-01-12 | End: 2025-01-15 | Stop reason: HOSPADM

## 2025-01-11 RX ORDER — ARFORMOTEROL TARTRATE 15 UG/2ML
15 SOLUTION RESPIRATORY (INHALATION)
Status: DISCONTINUED | OUTPATIENT
Start: 2025-01-11 | End: 2025-01-15 | Stop reason: HOSPADM

## 2025-01-11 RX ORDER — BUDESONIDE 0.5 MG/2ML
0.5 INHALANT ORAL
Status: DISCONTINUED | OUTPATIENT
Start: 2025-01-11 | End: 2025-01-15 | Stop reason: HOSPADM

## 2025-01-11 RX ORDER — METHYLPREDNISOLONE SODIUM SUCCINATE 125 MG/2ML
125 INJECTION, POWDER, LYOPHILIZED, FOR SOLUTION INTRAMUSCULAR; INTRAVENOUS ONCE
Status: COMPLETED | OUTPATIENT
Start: 2025-01-11 | End: 2025-01-11

## 2025-01-11 RX ORDER — ENOXAPARIN SODIUM 100 MG/ML
40 INJECTION SUBCUTANEOUS DAILY
Status: DISCONTINUED | OUTPATIENT
Start: 2025-01-12 | End: 2025-01-15 | Stop reason: HOSPADM

## 2025-01-11 RX ORDER — IPRATROPIUM BROMIDE AND ALBUTEROL SULFATE 2.5; .5 MG/3ML; MG/3ML
3 SOLUTION RESPIRATORY (INHALATION)
Status: COMPLETED | OUTPATIENT
Start: 2025-01-11 | End: 2025-01-11

## 2025-01-11 RX ORDER — SODIUM CHLORIDE 0.9 % (FLUSH) 0.9 %
10 SYRINGE (ML) INJECTION EVERY 12 HOURS SCHEDULED
Status: DISCONTINUED | OUTPATIENT
Start: 2025-01-11 | End: 2025-01-15 | Stop reason: HOSPADM

## 2025-01-11 RX ORDER — IBUPROFEN 600 MG/1
1 TABLET ORAL
Status: DISCONTINUED | OUTPATIENT
Start: 2025-01-11 | End: 2025-01-15 | Stop reason: HOSPADM

## 2025-01-11 RX ORDER — DEXTROSE MONOHYDRATE 25 G/50ML
25 INJECTION, SOLUTION INTRAVENOUS
Status: DISCONTINUED | OUTPATIENT
Start: 2025-01-11 | End: 2025-01-15 | Stop reason: HOSPADM

## 2025-01-11 RX ORDER — INSULIN LISPRO 100 [IU]/ML
2-7 INJECTION, SOLUTION INTRAVENOUS; SUBCUTANEOUS
Status: DISCONTINUED | OUTPATIENT
Start: 2025-01-11 | End: 2025-01-15 | Stop reason: HOSPADM

## 2025-01-11 RX ORDER — SODIUM CHLORIDE 0.9 % (FLUSH) 0.9 %
10 SYRINGE (ML) INJECTION AS NEEDED
Status: DISCONTINUED | OUTPATIENT
Start: 2025-01-11 | End: 2025-01-14

## 2025-01-11 RX ORDER — IPRATROPIUM BROMIDE AND ALBUTEROL SULFATE 2.5; .5 MG/3ML; MG/3ML
3 SOLUTION RESPIRATORY (INHALATION)
Status: DISCONTINUED | OUTPATIENT
Start: 2025-01-11 | End: 2025-01-15

## 2025-01-11 RX ORDER — SODIUM CHLORIDE 9 MG/ML
40 INJECTION, SOLUTION INTRAVENOUS AS NEEDED
Status: DISCONTINUED | OUTPATIENT
Start: 2025-01-11 | End: 2025-01-15 | Stop reason: HOSPADM

## 2025-01-11 RX ORDER — NICOTINE POLACRILEX 4 MG
15 LOZENGE BUCCAL
Status: DISCONTINUED | OUTPATIENT
Start: 2025-01-11 | End: 2025-01-15 | Stop reason: HOSPADM

## 2025-01-11 RX ORDER — SODIUM CHLORIDE 0.9 % (FLUSH) 0.9 %
10 SYRINGE (ML) INJECTION AS NEEDED
Status: DISCONTINUED | OUTPATIENT
Start: 2025-01-11 | End: 2025-01-15 | Stop reason: HOSPADM

## 2025-01-11 RX ADMIN — BUDESONIDE 0.5 MG: 0.5 INHALANT RESPIRATORY (INHALATION) at 23:20

## 2025-01-11 RX ADMIN — SODIUM CHLORIDE 1000 MG: 9 INJECTION INTRAMUSCULAR; INTRAVENOUS; SUBCUTANEOUS at 20:17

## 2025-01-11 RX ADMIN — INSULIN LISPRO 4 UNITS: 100 INJECTION, SOLUTION INTRAVENOUS; SUBCUTANEOUS at 21:25

## 2025-01-11 RX ADMIN — SODIUM CHLORIDE 500 MG: 9 INJECTION, SOLUTION INTRAVENOUS at 20:18

## 2025-01-11 RX ADMIN — IPRATROPIUM BROMIDE AND ALBUTEROL SULFATE 3 ML: 2.5; .5 SOLUTION RESPIRATORY (INHALATION) at 18:35

## 2025-01-11 RX ADMIN — IPRATROPIUM BROMIDE AND ALBUTEROL SULFATE 3 ML: 2.5; .5 SOLUTION RESPIRATORY (INHALATION) at 18:50

## 2025-01-11 RX ADMIN — IPRATROPIUM BROMIDE AND ALBUTEROL SULFATE 3 ML: 2.5; .5 SOLUTION RESPIRATORY (INHALATION) at 23:20

## 2025-01-11 RX ADMIN — ARFORMOTEROL TARTRATE 15 MCG: 15 SOLUTION RESPIRATORY (INHALATION) at 23:20

## 2025-01-11 RX ADMIN — IPRATROPIUM BROMIDE AND ALBUTEROL SULFATE 3 ML: 2.5; .5 SOLUTION RESPIRATORY (INHALATION) at 18:45

## 2025-01-11 RX ADMIN — METHYLPREDNISOLONE SODIUM SUCCINATE 125 MG: 125 INJECTION, POWDER, FOR SOLUTION INTRAMUSCULAR; INTRAVENOUS at 19:20

## 2025-01-11 NOTE — ED PROVIDER NOTES
Time: 6:14 PM EST  Date of encounter:  1/11/2025  Independent Historian/Clinical History and Information was obtained by:   Patient   Chief Complaint: Shortness of breath    History is limited by: N/A    History of Present Illness:  Patient is a 74 y.o. year old female who presents to the emergency department for evaluation of shortness of breath.  Patient notes that she is chronically short of breath from COPD.  Patient wears oxygen at all times.  States that she has had increasing shortness of breath for 7 days.  She also had decreased appetite.  The patient denies nausea or vomiting.  She denies any abdominal pain.  She has had no urinary frequency urgency or dysuria.  Patient denies any diarrhea.  She denies any hematochezia or melena.  She denies any swelling in the legs.  Patient notes a nonproductive cough.  She has had no fever or rigors.    HPI    Patient Care Team  Primary Care Provider: Cheyenne Russell APRN    Past Medical History:     Allergies   Allergen Reactions    Cilostazol Shortness Of Breath    Sulfa Antibiotics Hives     Past Medical History:   Diagnosis Date    Allergy to sunlight     PER PT    Asthma     Cancer of left breast     INVASVIE LOBULAR CARCINOMA    Cardiac disorder     DR. MALCOLM ANTHONY    Condition not found     RIGHT EYE CATARACT (LT CATARACT REPAIRED)    Constipation     COPD (chronic obstructive pulmonary disease)     Diabetes mellitus     Elevated cholesterol     Heart attack     3-22-18 NON STEMI, CABG 3-23-18    Hx of bronchitis     SMOKES 5-7 CIGERETTS PER DAY    Hx of CABG     3V CABG 2018    Hypertension     MEDS CONTROL    Neuropathy     IN FEETH FROM DM    PAD (peripheral artery disease)     Raynaud's syndrome     Status post left breast lumpectomy      Past Surgical History:   Procedure Laterality Date    BREAST LUMPECTOMY      LEFT    CARDIAC CATHETERIZATION N/A 9/15/2022    Procedure: Aortogram with bilateral leg angiogram, possible angioplasty or stenting;   Surgeon: Jose Armando Mcbride MD;  Location: [facility] CATH INVASIVE LOCATION;  Service: Vascular;  Laterality: N/A;    COLONOSCOPY      COLONOSCOPY N/A 7/9/2024    Procedure: COLONOSCOPY WITH BIOPSY POLYPECTOMY;  Surgeon: Dheeraj Cho MD;  Location: [facility] ENDOSCOPY;  Service: General;  Laterality: N/A;  COLON POLYP, DIVERTICULOSIS    CORONARY ARTERY BYPASS GRAFT      LAPAROSCOPIC TUBAL LIGATION      OTHER SURGICAL HISTORY      BILAT PAD LEGS    OTHER SURGICAL HISTORY      CHIP IN LEFT BREAST, MARKER FOR SPOT    OTHER SURGICAL HISTORY      LEFT KNEE REPAIR     Family History   Problem Relation Age of Onset    Stroke Mother     Prostate cancer Father     Diabetes Father     Pancreatic cancer Brother     Diabetes Brother     Breast cancer Maternal Aunt     Breast cancer Maternal Aunt     Breast cancer Paternal Aunt     Diabetes Other     Hypertension Other     Malig Hyperthermia Neg Hx        Home Medications:  Prior to Admission medications    Medication Sig Start Date End Date Taking? Authorizing Provider   Accu-Chek FastClix Lancets misc Inject 1 Device under the skin into the appropriate area as directed 2 (Two) Times a Day. as directed  Patient not taking: Reported on 11/14/2024 6/1/21   Daljit Miller MD   Accu-Molly Guide test strip 1 each by Other route 3 (Three) Times a Day. as directed  Patient not taking: Reported on 11/14/2024 6/1/21   Daljit Miller MD   albuterol sulfate  (90 Base) MCG/ACT inhaler Inhale 1 puff 2 (Two) Times a Day.    Daljit Miller MD   ASPIRIN 81 PO Take 1 tablet/day by mouth Daily.    Daljit Miller MD   atorvastatin (LIPITOR) 10 MG tablet Take 1 tablet by mouth Daily. 10/27/22   Daljit Miller MD   B-D ULTRAFINE III SHORT PEN 31G X 8 MM misc Inject 1 application under the skin into the appropriate area as directed 2 (Two) Times a Day.  Patient not taking: Reported on 11/14/2024 6/1/21   Daljit Miller MD   Biotin 1000 MCG tablet Take  1 tablet by mouth Daily.    Daljit Miller MD   docusate sodium (COLACE) 100 MG capsule Take 1 tablet by mouth Take As Directed.  Patient not taking: Reported on 11/14/2024    Daljit Miller MD   escitalopram (LEXAPRO) 10 MG tablet Take 1 tablet by mouth Daily.  Patient not taking: Reported on 11/14/2024 5/18/21   Daljit Miller MD   gabapentin (NEURONTIN) 100 MG capsule Take 1 capsule by mouth 3 (Three) Times a Day. 9/18/24   Daljit Miller MD   insulin glargine (LANTUS, SEMGLEE) 100 UNIT/ML injection Inject 25 Units under the skin into the appropriate area as directed Every Night.    Daljit Miller MD   letrozole (FEMARA) 2.5 MG tablet TAKE 1 TABLET BY MOUTH DAILY 10/14/24   Karan Moore MD   Magnesium 500 MG capsule Take 1 tablet by mouth As Needed.    Daljit Miller MD   metoprolol tartrate (LOPRESSOR) 25 MG tablet Take 1 tablet by mouth 2 (Two) Times a Day.    Daljit Miller MD   montelukast (SINGULAIR) 10 MG tablet Take 1 tablet by mouth Every Night. 4/19/23   Daljit Miller MD   Omega-3 Fatty Acids (fish oil) 1000 MG capsule capsule Take 1 tablet by mouth Daily.    Daljit Miller MD   omeprazole (priLOSEC) 40 MG capsule TAKE 1 CAPSULE BY MOUTH EVERY EVENING AS NEEDED FOR ACID REFLUX 2/7/24   Daljit Miller MD   pentoxifylline (TRENtal) 400 MG CR tablet Take 1 tablet by mouth 2 (two) times a day.    Daljit Miller MD   pentoxifylline (TRENtal) 400 MG CR tablet Take 1 tablet by mouth 3 (Three) Times a Day.  Patient not taking: Reported on 11/14/2024 10/27/22   Daljit Miller MD   Semaglutide,0.25 or 0.5MG/DOS, (Ozempic, 0.25 or 0.5 MG/DOSE,) 2 MG/1.5ML solution pen-injector Inject 0.25 mg under the skin into the appropriate area as directed 1 (One) Time Per Week.    Daljit Miller MD   senna (SENOKOT) 8.6 MG tablet Take 1 tablet by mouth Daily.    Daljit Miller MD        Social History:   Social History  "    Tobacco Use    Smoking status: Every Day     Current packs/day: 0.50     Average packs/day: 0.5 packs/day for 54.5 years (27.3 ttl pk-yrs)     Types: Cigarettes     Start date: 7/7/1970    Smokeless tobacco: Never    Tobacco comments:     7-10 CIGARETTES     LAST SMOKED 5/2 0400   Vaping Use    Vaping status: Never Used   Substance Use Topics    Alcohol use: Not Currently    Drug use: Never         Review of Systems:  Review of Systems   Constitutional:  Positive for fatigue. Negative for chills, diaphoresis and fever.   HENT:  Negative for congestion, postnasal drip, rhinorrhea and sore throat.    Eyes:  Negative for photophobia.   Respiratory:  Positive for cough, shortness of breath and wheezing. Negative for chest tightness.    Cardiovascular:  Negative for chest pain, palpitations and leg swelling.   Gastrointestinal:  Negative for abdominal pain, diarrhea, nausea and vomiting.   Genitourinary:  Negative for difficulty urinating, dysuria, flank pain, frequency, hematuria and urgency.   Musculoskeletal:  Negative for neck pain and neck stiffness.   Skin:  Negative for pallor and rash.   Neurological:  Negative for dizziness, syncope, weakness, numbness and headaches.   Hematological:  Negative for adenopathy. Does not bruise/bleed easily.   Psychiatric/Behavioral: Negative.          Physical Exam:  /62   Pulse 102   Temp 98.5 °F (36.9 °C) (Oral)   Resp 25   Ht 157.5 cm (62\")   Wt 64.1 kg (141 lb 5 oz)   SpO2 94%   BMI 25.85 kg/m²     Physical Exam  Vitals and nursing note reviewed.   Constitutional:       General: She is not in acute distress.     Appearance: Normal appearance. She is not ill-appearing, toxic-appearing or diaphoretic.   HENT:      Head: Normocephalic and atraumatic.      Mouth/Throat:      Mouth: Mucous membranes are moist.      Pharynx: No pharyngeal swelling.   Eyes:      Pupils: Pupils are equal, round, and reactive to light.   Cardiovascular:      Rate and Rhythm: Normal " rate and regular rhythm.      Pulses: Normal pulses.           Carotid pulses are 2+ on the right side and 2+ on the left side.       Radial pulses are 2+ on the right side and 2+ on the left side.        Femoral pulses are 2+ on the right side and 2+ on the left side.       Popliteal pulses are 2+ on the right side and 2+ on the left side.        Dorsalis pedis pulses are 2+ on the right side and 2+ on the left side.        Posterior tibial pulses are 2+ on the right side and 2+ on the left side.      Heart sounds: Normal heart sounds. No murmur heard.  Pulmonary:      Effort: Pulmonary effort is normal. No accessory muscle usage, respiratory distress or retractions.      Breath sounds: Examination of the right-upper field reveals decreased breath sounds and wheezing. Examination of the left-upper field reveals decreased breath sounds and wheezing. Examination of the right-middle field reveals decreased breath sounds and wheezing. Examination of the left-middle field reveals decreased breath sounds and wheezing. Examination of the right-lower field reveals decreased breath sounds. Examination of the left-lower field reveals decreased breath sounds. Decreased breath sounds and wheezing present. No rhonchi or rales.   Chest:      Chest wall: No mass or tenderness.   Abdominal:      General: Abdomen is flat. There is no distension.      Palpations: Abdomen is soft. There is no mass or pulsatile mass.      Tenderness: There is no abdominal tenderness. There is no right CVA tenderness, left CVA tenderness, guarding or rebound.      Comments: No rigidity   Musculoskeletal:         General: No swelling, tenderness or deformity.      Cervical back: Neck supple. No tenderness.      Right lower leg: No tenderness. No edema.      Left lower leg: No tenderness. No edema.   Skin:     General: Skin is warm and dry.      Capillary Refill: Capillary refill takes less than 2 seconds.      Coloration: Skin is not jaundiced or pale.       Findings: No erythema.   Neurological:      General: No focal deficit present.      Mental Status: She is alert and oriented to person, place, and time. Mental status is at baseline.      Cranial Nerves: Cranial nerves 2-12 are intact. No cranial nerve deficit.      Sensory: Sensation is intact. No sensory deficit.      Motor: Motor function is intact. No weakness or pronator drift.      Coordination: Coordination is intact. Coordination normal.   Psychiatric:         Mood and Affect: Mood normal.         Behavior: Behavior normal.                  Procedures:  Procedures      Medical Decision Making:      Comorbidities that affect care:    Coronary disease, hypertension, peripheral arterial disease, asthma, diabetes, Raynaud's, breast cancer, COPD, hyperlipidemia, current smoker, chronic respiratory failure requiring oxygen    External Notes reviewed:    None      The following orders were placed and all results were independently analyzed by me:  Orders Placed This Encounter   Procedures    COVID-19, FLU A/B, RSV PCR 1 HR TAT - Swab, Nasopharynx    Rapid Strep A Screen - Swab, Throat    Beta Strep Culture, Throat - Swab, Throat    Blood Culture - Blood,    Blood Culture - Blood,    XR Chest 1 View    [place] Draw    Comprehensive Metabolic Panel    BNP    High Sensitivity Troponin T    CBC Auto Differential    Lactic Acid, Plasma    High Sensitivity Troponin T 1Hr    NPO Diet NPO Type: Strict NPO    Undress & Gown    Continuous Pulse Oximetry    Vital Signs    Inpatient Hospitalist Consult    Oxygen Therapy- Nasal Cannula; Titrate 1-6 LPM Per SpO2; 90 - 95%    ECG 12 Lead ED Triage Standing Order; SOA    Insert Peripheral IV    CBC & Differential    Green Top (Gel)    Lavender Top    Gold Top - SST    Light Blue Top       Medications Given in the Emergency Department:  Medications   sodium chloride 0.9 % flush 10 mL (has no administration in time range)   azithromycin (ZITHROMAX) 500 mg in sodium chloride 0.9  % 250 mL IVPB-VTB (500 mg Intravenous New Bag 1/11/25 2018)   ipratropium-albuterol (DUO-NEB) nebulizer solution 3 mL (3 mL Nebulization Given 1/11/25 1850)   methylPREDNISolone sodium succinate (SOLU-Medrol) injection 125 mg (125 mg Intravenous Given 1/11/25 1920)   cefTRIAXone (ROCEPHIN) in NS 1 gram/10ml IV PUSH syringe (1,000 mg Intravenous Given 1/11/25 2017)        ED Course:    ED Course as of 01/11/25 2040   Sat Jan 11, 2025 1939 EKG:    Rhythm: Sinus tachycardia  Rate: 100  Intervals: Normal ND and QT interval  T-wave: No pathological T wave  ST Segment: J-point elevation III, aVF, artifact in V1 and V2 obscures detail, no obvious pathological ST elevation and reciprocal ST depression to suggest STEMI    EKG Comparison: No EKG available for comparison    Interpreted by me   [SD]      ED Course User Index  [SD] Lester John DO       Labs:    Lab Results (last 24 hours)       Procedure Component Value Units Date/Time    COVID-19, FLU A/B, RSV PCR 1 HR TAT - Swab, Nasopharynx [[id number]]  (Normal) Collected: 01/11/25 1826    Specimen: Swab from Nasopharynx Updated: 01/11/25 1924     COVID19 Not Detected     Influenza A PCR Not Detected     Influenza B PCR Not Detected     RSV, PCR Not Detected    Narrative:      Fact sheet for providers: https://www.fda.gov/media/003965/download    Fact sheet for patients: https://www.fda.gov/media/607414/download    Test performed by PCR.    Rapid Strep A Screen - Swab, Throat [[id number]]  (Normal) Collected: 01/11/25 1826    Specimen: Swab from Throat Updated: 01/11/25 1906     Strep A Ag Negative    Beta Strep Culture, Throat - Swab, Throat [[id number]] Collected: 01/11/25 1826    Specimen: Swab from Throat Updated: 01/11/25 1906    CBC & Differential [[id number]]  (Abnormal) Collected: 01/11/25 1830    Specimen: Blood Updated: 01/11/25 1853    Narrative:      The following orders were created for panel order CBC & Differential.  Procedure                                Abnormality         Status                     ---------                               -----------         ------                     CBC Auto Differential[[id number]]        Abnormal            Final result                 Please view results for these tests on the individual orders.    Comprehensive Metabolic Panel [[id number]]  (Abnormal) Collected: 01/11/25 1830    Specimen: Blood Updated: 01/11/25 1943     Glucose 256 mg/dL      BUN 15 mg/dL      Creatinine 0.75 mg/dL      Sodium 132 mmol/L      Potassium 3.9 mmol/L      Chloride 93 mmol/L      CO2 26.0 mmol/L      Calcium 9.7 mg/dL      Total Protein 7.5 g/dL      Albumin 3.4 g/dL      ALT (SGPT) 9 U/L      AST (SGOT) 20 U/L      Alkaline Phosphatase 102 U/L      Total Bilirubin 0.5 mg/dL      Globulin 4.1 gm/dL      A/G Ratio 0.8 g/dL      BUN/Creatinine Ratio 20.0     Anion Gap 13.0 mmol/L      eGFR 83.1 mL/min/1.73     Narrative:      GFR Categories in Chronic Kidney Disease (CKD)      GFR Category          GFR (mL/min/1.73)    Interpretation  G1                     90 or greater         Normal or high (1)  G2                      60-89                Mild decrease (1)  G3a                   45-59                Mild to moderate decrease  G3b                   30-44                Moderate to severe decrease  G4                    15-29                Severe decrease  G5                    14 or less           Kidney failure          (1)In the absence of evidence of kidney disease, neither GFR category G1 or G2 fulfill the criteria for CKD.    eGFR calculation 2021 CKD-EPI creatinine equation, which does not include race as a factor    BNP [[id number]]  (Normal) Collected: 01/11/25 1830    Specimen: Blood Updated: 01/11/25 1942     proBNP 513.3 pg/mL     Narrative:      This assay is used as an aid in the diagnosis of individuals suspected of having heart failure. It can be used as an aid in the diagnosis of acute decompensated heart failure (ADHF) in patients  presenting with signs and symptoms of ADHF to the emergency department (ED). In addition, NT-proBNP of <300 pg/mL indicates ADHF is not likely.    Age Range Result Interpretation  NT-proBNP Concentration (pg/mL:      <50             Positive            >450                   Gray                 300-450                    Negative             <300    50-75           Positive            >900                  Gray                300-900                  Negative            <300      >75             Positive            >1800                  Gray                300-1800                  Negative            <300    High Sensitivity Troponin T [[id number]]  (Abnormal) Collected: 01/11/25 1830    Specimen: Blood Updated: 01/11/25 1938     HS Troponin T 19 ng/L     Narrative:      High Sensitive Troponin T Reference Range:  <14.0 ng/L- Negative Female for AMI  <22.0 ng/L- Negative Male for AMI  >=14 - Abnormal Female indicating possible myocardial injury.  >=22 - Abnormal Male indicating possible myocardial injury.   Clinicians would have to utilize clinical acumen, EKG, Troponin, and serial changes to determine if it is an Acute Myocardial Infarction or myocardial injury due to an underlying chronic condition.         CBC Auto Differential [[id number]]  (Abnormal) Collected: 01/11/25 1830    Specimen: Blood Updated: 01/11/25 1853     WBC 13.59 10*3/mm3      RBC 4.62 10*6/mm3      Hemoglobin 12.7 g/dL      Hematocrit 40.2 %      MCV 87.0 fL      MCH 27.5 pg      MCHC 31.6 g/dL      RDW 13.2 %      RDW-SD 41.9 fl      MPV 10.1 fL      Platelets 333 10*3/mm3      Neutrophil % 71.4 %      Lymphocyte % 11.6 %      Monocyte % 13.8 %      Eosinophil % 2.3 %      Basophil % 0.3 %      Immature Grans % 0.6 %      Neutrophils, Absolute 9.71 10*3/mm3      Lymphocytes, Absolute 1.57 10*3/mm3      Monocytes, Absolute 1.88 10*3/mm3      Eosinophils, Absolute 0.31 10*3/mm3      Basophils, Absolute 0.04 10*3/mm3      Immature Grans,  Absolute 0.08 10*3/mm3      nRBC 0.0 /100 WBC              Imaging:    XR Chest 1 View    Result Date: 1/11/2025  XR CHEST 1 VW Date of Exam: 1/11/2025 6:10 PM EST Indication: SOA Triage Protocol Comparison: Chest CT 11/12/2024 Findings: Cardiomediastinal silhouette is unremarkable. Right upper lobe airspace disease consistent with pneumonia. Lungs are otherwise clear. No pneumothorax nor significant effusion. No acute osseous abnormality identified.     Impression: Right upper lobe pneumonia Electronically Signed: Pascual Sarmiento MD  1/11/2025 6:30 PM EST  Workstation ID: [id number]       Differential Diagnosis and Discussion:    Dyspnea: Differential diagnosis includes but is not limited to metabolic acidosis, neurological disorders, psychogenic, asthma, pneumothorax, upper airway obstruction, COPD, pneumonia, noncardiogenic pulmonary edema, interstitial lung disease, anemia, congestive heart failure, and pulmonary embolism    Labs were collected in the emergency department and all labs were reviewed and interpreted by me.  X-ray were performed in the emergency department and all X-ray impressions were independently interpreted by me.  An EKG was performed and the EKG was interpreted by me.    MDM  Number of Diagnoses or Management Options  Chronic obstructive pulmonary disease, unspecified COPD type  Chronic respiratory failure with hypoxia  Pneumonia of right upper lobe due to infectious organism  Uncontrolled type 2 diabetes mellitus with hyperglycemia  Diagnosis management comments: Sepsis was not present in the emergency department or on arrival. This is supported as the patient does not either meet two out of the four SIRS criteria or has an obvious bacterial infection.      SIRS criteria considered:   1.                     Temperature > 100.4 or <98.6    2.                     Heart Rate > 90    3.                     Respiratory Rate > 22    4.                     WBC > 12K or <4K.      PSI/PORT Score:  Pneumonia Severity Index for CAP - MDCalc  Calculated on Jan 11 2025 8:12 PM  105 points -> Risk Class IV, 8.2-9.3% mortality. Hospitalization recommended based on risk.    The patient was given Zithromax and Rocephin for commune acquired pneumonia.  The patient was given Solu-Medrol and 3 DuoNebs.    Due to the patient's high port score.  The patient be admitted for commune acquired pneumonia and COPD exacerbation       Amount and/or Complexity of Data Reviewed  Clinical lab tests: reviewed  Tests in the radiology section of CPT®: reviewed  Tests in the medicine section of CPT®: reviewed  Discuss the patient with other providers: yes (20:40 EST  I discussed the case with the hospitalist, Dr. Guerra.  We have discussed the patient's presenting symptoms, laboratory values, imaging and condition at the time of admission.  They will evaluate the patient in the emergency room and admit the patient to the hospital)             Social Determinants of Health:    Patient is independent, reliable, and has access to care.       Disposition and Care Coordination:    Admit:   Through independent evaluation of the patient's history, physical, and imperical data, the patient meets criteria for inpatient admission to the hospital.        Final diagnoses:   Pneumonia of right upper lobe due to infectious organism   Chronic obstructive pulmonary disease, unspecified COPD type   Chronic respiratory failure with hypoxia   Uncontrolled type 2 diabetes mellitus with hyperglycemia        ED Disposition       ED Disposition   Decision to Admit    Condition   --    Comment   --               This medical record created using voice recognition software.             Lester John DO  01/15/25 1531

## 2025-01-12 ENCOUNTER — APPOINTMENT (OUTPATIENT)
Dept: CT IMAGING | Facility: HOSPITAL | Age: 75
End: 2025-01-12
Payer: MEDICARE

## 2025-01-12 LAB
ALBUMIN SERPL-MCNC: 3.1 G/DL (ref 3.5–5.2)
ALBUMIN/GLOB SERPL: 0.8 G/DL
ALP SERPL-CCNC: 92 U/L (ref 39–117)
ALT SERPL W P-5'-P-CCNC: 10 U/L (ref 1–33)
ANION GAP SERPL CALCULATED.3IONS-SCNC: 12.7 MMOL/L (ref 5–15)
AST SERPL-CCNC: 21 U/L (ref 1–32)
BASOPHILS # BLD AUTO: 0.03 10*3/MM3 (ref 0–0.2)
BASOPHILS NFR BLD AUTO: 0.3 % (ref 0–1.5)
BILIRUB SERPL-MCNC: 0.4 MG/DL (ref 0–1.2)
BUN SERPL-MCNC: 13 MG/DL (ref 8–23)
BUN/CREAT SERPL: 21.3 (ref 7–25)
CALCIUM SPEC-SCNC: 8.8 MG/DL (ref 8.6–10.5)
CHLORIDE SERPL-SCNC: 95 MMOL/L (ref 98–107)
CO2 SERPL-SCNC: 22.3 MMOL/L (ref 22–29)
CREAT SERPL-MCNC: 0.61 MG/DL (ref 0.57–1)
DEPRECATED RDW RBC AUTO: 40.6 FL (ref 37–54)
EGFRCR SERPLBLD CKD-EPI 2021: 93.4 ML/MIN/1.73
EOSINOPHIL # BLD AUTO: 0 10*3/MM3 (ref 0–0.4)
EOSINOPHIL NFR BLD AUTO: 0 % (ref 0.3–6.2)
ERYTHROCYTE [DISTWIDTH] IN BLOOD BY AUTOMATED COUNT: 12.8 % (ref 12.3–15.4)
GLOBULIN UR ELPH-MCNC: 3.7 GM/DL
GLUCOSE BLDC GLUCOMTR-MCNC: 211 MG/DL (ref 70–99)
GLUCOSE BLDC GLUCOMTR-MCNC: 265 MG/DL (ref 70–99)
GLUCOSE BLDC GLUCOMTR-MCNC: 352 MG/DL (ref 70–99)
GLUCOSE BLDC GLUCOMTR-MCNC: 354 MG/DL (ref 70–99)
GLUCOSE BLDC GLUCOMTR-MCNC: 371 MG/DL (ref 70–99)
GLUCOSE SERPL-MCNC: 319 MG/DL (ref 65–99)
HCT VFR BLD AUTO: 38.1 % (ref 34–46.6)
HGB BLD-MCNC: 12.3 G/DL (ref 12–15.9)
IMM GRANULOCYTES # BLD AUTO: 0.05 10*3/MM3 (ref 0–0.05)
IMM GRANULOCYTES NFR BLD AUTO: 0.4 % (ref 0–0.5)
L PNEUMO1 AG UR QL IA: NEGATIVE
LYMPHOCYTES # BLD AUTO: 0.68 10*3/MM3 (ref 0.7–3.1)
LYMPHOCYTES NFR BLD AUTO: 5.7 % (ref 19.6–45.3)
MAGNESIUM SERPL-MCNC: 2 MG/DL (ref 1.6–2.4)
MCH RBC QN AUTO: 28 PG (ref 26.6–33)
MCHC RBC AUTO-ENTMCNC: 32.3 G/DL (ref 31.5–35.7)
MCV RBC AUTO: 86.6 FL (ref 79–97)
MONOCYTES # BLD AUTO: 0.41 10*3/MM3 (ref 0.1–0.9)
MONOCYTES NFR BLD AUTO: 3.4 % (ref 5–12)
NEUTROPHILS NFR BLD AUTO: 10.73 10*3/MM3 (ref 1.7–7)
NEUTROPHILS NFR BLD AUTO: 90.2 % (ref 42.7–76)
NRBC BLD AUTO-RTO: 0 /100 WBC (ref 0–0.2)
PLATELET # BLD AUTO: 322 10*3/MM3 (ref 140–450)
PMV BLD AUTO: 10.3 FL (ref 6–12)
POTASSIUM SERPL-SCNC: 4.5 MMOL/L (ref 3.5–5.2)
PROCALCITONIN SERPL-MCNC: 0.67 NG/ML (ref 0–0.25)
PROT SERPL-MCNC: 6.8 G/DL (ref 6–8.5)
RBC # BLD AUTO: 4.4 10*6/MM3 (ref 3.77–5.28)
S PNEUM AG SPEC QL LA: NEGATIVE
SODIUM SERPL-SCNC: 130 MMOL/L (ref 136–145)
WBC NRBC COR # BLD AUTO: 11.9 10*3/MM3 (ref 3.4–10.8)

## 2025-01-12 PROCEDURE — 25010000002 ENOXAPARIN PER 10 MG: Performed by: INTERNAL MEDICINE

## 2025-01-12 PROCEDURE — 85025 COMPLETE CBC W/AUTO DIFF WBC: CPT | Performed by: INTERNAL MEDICINE

## 2025-01-12 PROCEDURE — 71250 CT THORAX DX C-: CPT

## 2025-01-12 PROCEDURE — 99232 SBSQ HOSP IP/OBS MODERATE 35: CPT | Performed by: INTERNAL MEDICINE

## 2025-01-12 PROCEDURE — 87449 NOS EACH ORGANISM AG IA: CPT | Performed by: INTERNAL MEDICINE

## 2025-01-12 PROCEDURE — 87899 AGENT NOS ASSAY W/OPTIC: CPT | Performed by: INTERNAL MEDICINE

## 2025-01-12 PROCEDURE — 63710000001 INSULIN LISPRO (HUMAN) PER 5 UNITS: Performed by: INTERNAL MEDICINE

## 2025-01-12 PROCEDURE — 94664 DEMO&/EVAL PT USE INHALER: CPT

## 2025-01-12 PROCEDURE — 94799 UNLISTED PULMONARY SVC/PX: CPT

## 2025-01-12 PROCEDURE — 94760 N-INVAS EAR/PLS OXIMETRY 1: CPT

## 2025-01-12 PROCEDURE — 84145 PROCALCITONIN (PCT): CPT | Performed by: INTERNAL MEDICINE

## 2025-01-12 PROCEDURE — 80053 COMPREHEN METABOLIC PANEL: CPT | Performed by: INTERNAL MEDICINE

## 2025-01-12 PROCEDURE — 25010000002 CEFTRIAXONE PER 250 MG: Performed by: INTERNAL MEDICINE

## 2025-01-12 PROCEDURE — 63710000001 INSULIN GLARGINE PER 5 UNITS: Performed by: INTERNAL MEDICINE

## 2025-01-12 PROCEDURE — 82948 REAGENT STRIP/BLOOD GLUCOSE: CPT

## 2025-01-12 PROCEDURE — 63710000001 PREDNISONE PER 1 MG: Performed by: INTERNAL MEDICINE

## 2025-01-12 PROCEDURE — 83735 ASSAY OF MAGNESIUM: CPT | Performed by: INTERNAL MEDICINE

## 2025-01-12 RX ORDER — PENTOXIFYLLINE 400 MG/1
400 TABLET, EXTENDED RELEASE ORAL 2 TIMES DAILY
Status: DISCONTINUED | OUTPATIENT
Start: 2025-01-13 | End: 2025-01-15 | Stop reason: HOSPADM

## 2025-01-12 RX ORDER — NYSTATIN 100000 [USP'U]/ML
5 SUSPENSION ORAL 4 TIMES DAILY
Status: DISCONTINUED | OUTPATIENT
Start: 2025-01-12 | End: 2025-01-15 | Stop reason: HOSPADM

## 2025-01-12 RX ORDER — ONDANSETRON 2 MG/ML
4 INJECTION INTRAMUSCULAR; INTRAVENOUS EVERY 6 HOURS PRN
Status: DISCONTINUED | OUTPATIENT
Start: 2025-01-12 | End: 2025-01-14

## 2025-01-12 RX ORDER — AZITHROMYCIN 250 MG/1
500 TABLET, FILM COATED ORAL
Status: COMPLETED | OUTPATIENT
Start: 2025-01-12 | End: 2025-01-13

## 2025-01-12 RX ORDER — GABAPENTIN 100 MG/1
100 CAPSULE ORAL 3 TIMES DAILY
Status: DISCONTINUED | OUTPATIENT
Start: 2025-01-13 | End: 2025-01-15 | Stop reason: HOSPADM

## 2025-01-12 RX ADMIN — INSULIN LISPRO 3 UNITS: 100 INJECTION, SOLUTION INTRAVENOUS; SUBCUTANEOUS at 20:19

## 2025-01-12 RX ADMIN — IPRATROPIUM BROMIDE AND ALBUTEROL SULFATE 3 ML: 2.5; .5 SOLUTION RESPIRATORY (INHALATION) at 11:28

## 2025-01-12 RX ADMIN — NYSTATIN 500000 UNITS: 100000 SUSPENSION ORAL at 20:19

## 2025-01-12 RX ADMIN — IPRATROPIUM BROMIDE AND ALBUTEROL SULFATE 3 ML: 2.5; .5 SOLUTION RESPIRATORY (INHALATION) at 06:36

## 2025-01-12 RX ADMIN — IPRATROPIUM BROMIDE AND ALBUTEROL SULFATE 3 ML: 2.5; .5 SOLUTION RESPIRATORY (INHALATION) at 23:56

## 2025-01-12 RX ADMIN — IPRATROPIUM BROMIDE AND ALBUTEROL SULFATE 3 ML: 2.5; .5 SOLUTION RESPIRATORY (INHALATION) at 15:35

## 2025-01-12 RX ADMIN — IPRATROPIUM BROMIDE AND ALBUTEROL SULFATE 3 ML: 2.5; .5 SOLUTION RESPIRATORY (INHALATION) at 20:38

## 2025-01-12 RX ADMIN — INSULIN LISPRO 6 UNITS: 100 INJECTION, SOLUTION INTRAVENOUS; SUBCUTANEOUS at 08:55

## 2025-01-12 RX ADMIN — PREDNISONE 40 MG: 20 TABLET ORAL at 08:54

## 2025-01-12 RX ADMIN — SODIUM CHLORIDE 1000 MG: 9 INJECTION INTRAMUSCULAR; INTRAVENOUS; SUBCUTANEOUS at 08:54

## 2025-01-12 RX ADMIN — GABAPENTIN 100 MG: 100 CAPSULE ORAL at 23:49

## 2025-01-12 RX ADMIN — BUDESONIDE 0.5 MG: 0.5 INHALANT RESPIRATORY (INHALATION) at 20:38

## 2025-01-12 RX ADMIN — AZITHROMYCIN DIHYDRATE 500 MG: 250 TABLET, FILM COATED ORAL at 08:54

## 2025-01-12 RX ADMIN — PENTOXIFYLLINE 400 MG: 400 TABLET, EXTENDED RELEASE ORAL at 23:48

## 2025-01-12 RX ADMIN — ARFORMOTEROL TARTRATE 15 MCG: 15 SOLUTION RESPIRATORY (INHALATION) at 06:36

## 2025-01-12 RX ADMIN — ENOXAPARIN SODIUM 40 MG: 100 INJECTION SUBCUTANEOUS at 08:55

## 2025-01-12 RX ADMIN — ARFORMOTEROL TARTRATE 15 MCG: 15 SOLUTION RESPIRATORY (INHALATION) at 20:38

## 2025-01-12 RX ADMIN — INSULIN GLARGINE 15 UNITS: 100 INJECTION, SOLUTION SUBCUTANEOUS at 08:55

## 2025-01-12 RX ADMIN — Medication 10 ML: at 00:40

## 2025-01-12 RX ADMIN — BUDESONIDE 0.5 MG: 0.5 INHALANT RESPIRATORY (INHALATION) at 06:36

## 2025-01-12 RX ADMIN — INSULIN LISPRO 6 UNITS: 100 INJECTION, SOLUTION INTRAVENOUS; SUBCUTANEOUS at 12:30

## 2025-01-12 RX ADMIN — NYSTATIN 500000 UNITS: 100000 SUSPENSION ORAL at 17:23

## 2025-01-12 RX ADMIN — IPRATROPIUM BROMIDE AND ALBUTEROL SULFATE 3 ML: 2.5; .5 SOLUTION RESPIRATORY (INHALATION) at 03:00

## 2025-01-12 RX ADMIN — Medication 10 ML: at 08:55

## 2025-01-12 RX ADMIN — INSULIN LISPRO 4 UNITS: 100 INJECTION, SOLUTION INTRAVENOUS; SUBCUTANEOUS at 17:24

## 2025-01-12 RX ADMIN — Medication 10 ML: at 20:20

## 2025-01-12 NOTE — PROGRESS NOTES
[facility]   Hospitalist Progress Note  Date: 2025  Patient Name: Gini Edmondson  : 1950  MRN: [id number]  Date of admission: 2025  Room/Bed: [facility]      Subjective   Subjective     Chief Complaint:   Shortness of breath    Summary:  Gini Edmondson is a 74 y.o. female with medical history of type 2 diabetes, dyslipidemia, history of breast cancer, coronary artery disease history of MI and CABG, COPD on home oxygen, and peripheral vascular disease who presents emergency department with cough and shortness of breath x 7 days     Patient states she started feeling poorly about 7 days prior. She has progressively become more short of breath and feels like her oxygen drops when she gets to move around.   She is also coughing up very brownish thick sputum.  She does not note having any fevers.  Or any chills.  Patient presented to the emergency department as she had not been improving at home. In the emergency department the patient's vital signs are as follows: Temperature is 98.5, pulse 102, respiratory 25, blood pressure 145/62, and satting 90% on room air.  CBC shows a white count of 13.  CMP shows a glucose 256.  Sodium 132.  COVID-19 and influenza are negative.  Rapid strep is negative.  Chest x-ray shows right upper lobe pneumonia.  She received ceftriaxone azithromycin.  Blood cultures were sent.  Patient admitted to hospital for acute on chronic hypoxic respiratory failure with community-acquired pneumonia.     Interval Followup:     Obtained CT of chest today.  This shows consolidation in the posterior right upper lung measuring 6.3 x 6.2 cm which may reflect pneumonia with reactive right hilar lymphadenopathy however right hilar neoplastic process cannot be excluded and treatment and follow-up imaging to ensure resolution is recommended.  Patient seen on rounds this morning, complaints of oral pain.  On exam patient found to have thrush.  Patient will do so describes having  severe nausea this morning.  Patient states that time she puts food in her mouth with difficulty initiating swallowing therefore will have speech see patient as well.    Objective   Objective     Vitals:   Temp:  [97.2 °F (36.2 °C)-98.6 °F (37 °C)] 97.2 °F (36.2 °C)  Heart Rate:  [] 103  Resp:  [16-22] 20  BP: (137-174)/(62-76) 149/76  Flow (L/min) (Oxygen Therapy):  [2-3] 2    Physical Exam               Constitutional: Chronically ill-appearing              Eyes: Pupils equal, sclerae anicteric, no conjunctival injection              HENT: NCAT, mucous membranes moist, thrush on tongue              Neck: Supple, no thyromegaly, no lymphadenopathy, trachea midline              Respiratory: Decreased breath sounds the right upper lobe.  Rhonchorous throughout, no crackles              Cardiovascular: RRR, no murmurs, rubs, or gallops, palpable pedal pulses bilaterally              Gastrointestinal: Positive bowel sounds, soft, nontender, nondistended              Musculoskeletal: No bilateral ankle edema, no clubbing or cyanosis to extremities              Neurologic: Oriented x 3, strength symmetric in all extremities, Cranial Nerves grossly intact to confrontation, speech clear              Skin: No rashes        Result Review    Result Review:  I have personally reviewed these results:  [x]  Laboratory      Lab 01/12/25 0320 01/11/25 2320 01/11/25 2015 01/11/25  1830   WBC 11.90*  --   --  13.59*   HEMOGLOBIN 12.3  --   --  12.7   HEMATOCRIT 38.1  --   --  40.2   PLATELETS 322  --   --  333   NEUTROS ABS 10.73*  --   --  9.71*   IMMATURE GRANS (ABS) 0.05  --   --  0.08*   LYMPHS ABS 0.68*  --   --  1.57   MONOS ABS 0.41  --   --  1.88*   EOS ABS 0.00  --   --  0.31   MCV 86.6  --   --  87.0   PROCALCITONIN 0.67*  --  0.76*  --    LACTATE  --  1.8 2.3*  --          Lab 01/12/25 0320 01/11/25  1830   SODIUM 130* 132*   POTASSIUM 4.5 3.9   CHLORIDE 95* 93*   CO2 22.3 26.0   ANION GAP 12.7 13.0   BUN 13 15    CREATININE 0.61 0.75   EGFR 93.4 83.1   GLUCOSE 319* 256*   CALCIUM 8.8 9.7   MAGNESIUM 2.0  --          Lab 01/12/25  0320 01/11/25 1830   TOTAL PROTEIN 6.8 7.5   ALBUMIN 3.1* 3.4*   GLOBULIN 3.7 4.1   ALT (SGPT) 10 9   AST (SGOT) 21 20   BILIRUBIN 0.4 0.5   ALK PHOS 92 102         Lab 01/11/25 2015 01/11/25  1830   PROBNP  --  513.3   HSTROP T 14* 19*                 Brief Urine Lab Results       None          [x]  Microbiology   Microbiology Results (last 10 days)       Procedure Component Value - Date/Time    S. Pneumo Ag Urine or CSF - Urine, Urine, Clean Catch [[id number]]  (Normal) Collected: 01/12/25 1153    Lab Status: Final result Specimen: Urine, Clean Catch Updated: 01/12/25 1425     Strep Pneumo Ag Negative    Legionella Antigen, Urine - Urine, Urine, Clean Catch [[id number]]  (Normal) Collected: 01/12/25 1153    Lab Status: Final result Specimen: Urine, Clean Catch Updated: 01/12/25 1423     LEGIONELLA ANTIGEN, URINE Negative    Respiratory Culture - Sputum, Cough [[id number]] Collected: 01/11/25 2340    Lab Status: Preliminary result Specimen: Sputum from Cough Updated: 01/12/25 0317     Gram Stain Many (4+) WBCs seen      Moderate (3+) Gram positive cocci in pairs and chains      Few (2+) Mucous strands      Moderate (3+) Epithelial cells seen    Mycoplasma Pneumoniae Antibody, IgM - Blood, [[id number]]  (Normal) Collected: 01/11/25 1830    Lab Status: Final result Specimen: Blood Updated: 01/11/25 2123     Mycoplasma pneumo IgM Negative    COVID-19, FLU A/B, RSV PCR 1 HR TAT - Swab, Nasopharynx [[id number]]  (Normal) Collected: 01/11/25 1826    Lab Status: Final result Specimen: Swab from Nasopharynx Updated: 01/11/25 1924     COVID19 Not Detected     Influenza A PCR Not Detected     Influenza B PCR Not Detected     RSV, PCR Not Detected    Narrative:      Fact sheet for providers: https://www.fda.gov/media/813843/download    Fact sheet for patients: https://www.fda.gov/media/879660/download    Test  performed by PCR.    Rapid Strep A Screen - Swab, Throat [[id number]]  (Normal) Collected: 01/11/25 1826    Lab Status: Final result Specimen: Swab from Throat Updated: 01/11/25 1906     Strep A Ag Negative    Beta Strep Culture, Throat - Swab, Throat [[id number]]  (Normal) Collected: 01/11/25 1826    Lab Status: Preliminary result Specimen: Swab from Throat Updated: 01/12/25 1355     Throat Culture, Beta Strep No Beta Hemolytic Streptococcus Isolated    Narrative:      Group A Strep incidence is low in adults. Positive culture for Beta hemolytic Streptococcus species can reflect colonization and not true infection. Please correlate clinically.          [x]  Radiology  CT Chest Without Contrast Diagnostic    Result Date: 1/12/2025  Impression: Consolidation in the posterior right upper lung measuring 6.3 x 6.2 cm which may reflect pneumonia with reactive right hilar lymphadenopathy however right hilar neoplastic process cannot be excluded and treatment and follow-up imaging to ensure resolution is recommended. Follow-up chest CT should be obtained with intravenous contrast for better evaluation of right hilar lesions. Bronchial wall thickening in the right upper and right lower lung with septal thickening associated with inflammatory changes. Electronically Signed: Beatirs Sarmiento MD  1/12/2025 9:37 AM EST  Workstation ID: [id number]    XR Chest 1 View    Result Date: 1/11/2025  Impression: Right upper lobe pneumonia Electronically Signed: Pascual Sarmiento MD  1/11/2025 6:30 PM EST  Workstation ID: [id number]   []  EKG/Telemetry   []  Cardiology/Vascular   []  Pathology  []  Old records  []  Other:    Assessment & Plan   Assessment / Plan     Assessment:  Acute on chronic hypoxic respiratory failure  Right upper lobe pneumonia  Acute COPD exacerbation on home oxygen  History of breast cancer on hormone blocker  Type 2 diabetes hyperglycemia  Thrush  Peripheral vascular disease  Coronary artery disease     Plan:  Patient  remains admitted to hospital for further care management  CT scan obtained today showing consolidation in posterior right upper lung measuring 6.3 x 6.2 cm which may reflect pneumonia with reactive right hilar lymphadenopathy however right hilar neoplastic process cannot be excluded and treatment and follow-up imaging to ensure resolution is recommended.  Patient remains on scheduled and as needed breathing treatments  Continue antibiotics for CAP coverage  Patient has been transitioned to oral prednisone, will continue  Nystatin swish and spit  Procalcitonin now downtrending, leukocytosis now downtrending  Continue long-acting and sliding scale insulin, adjust as needed     Discussed with RN.    VTE Prophylaxis:  Pharmacologic VTE prophylaxis orders are present.        CODE STATUS:   Level Of Support Discussed With: Patient  Code Status (Patient has no pulse and is not breathing): CPR (Attempt to Resuscitate)  Medical Interventions (Patient has pulse or is breathing): Full Support      Electronically signed by Jaun Davis MD, 1/12/2025, 18:59 EST.

## 2025-01-12 NOTE — H&P
[facility]  Date: [facility]   Patient Name: Gini Edmondson  : 1950  MRN: [id number]  Primary Care Physician:  Cheyenne Russell, JACY  Date of admission: 2025    Subjective   Subjective     Chief Complaint: Shortness of breath    HPI:    Gini Edmondson is a 74 y.o. female past medical history of type 2 diabetes, dyslipidemia, history of breast cancer, coronary artery disease history of MI and CABG, COPD on home oxygen, and peripheral vascular disease who presents emergency department with cough and shortness of breath x 7 days    Patient states that she started feeling poorly about 7 days ago.  She has progressively become more short of breath and feels like her oxygen drops when she gets to move around.  Becomes extremely short of breath.  She is also coughing up very brownish thick sputum.  She does not note having any fevers.  Or any chills.  But due to the fact that this is not improving and she is becoming concerned about her oxygen level she came to the ER for further evaluation.    In the emergency department the patient's vital signs are as follows: Temperature is 98.5, pulse 102, respiratory 25, blood pressure 145/62, and satting 90% on room air.  CBC shows a white count of 13.  CMP shows a glucose 256.  Sodium 132.  COVID-19 and influenza are negative.  Rapid strep is negative.  Chest x-ray shows right upper lobe pneumonia.  She got ceftriaxone azithromycin.  Blood cultures were sent.  Patient admitted to hospital for acute on chronic hypoxic respiratory failure with community-acquired pneumonia.    All systems reviewed are as noted above    Personal History     Past Medical History:  Type 2 diabetes  Dyslipidemia  History of breast cancer  GERD  COPD  Coronary artery disease with history of MI and eventually a CABG  Peripheral vascular disease  Raynaud's  Hypertension      Past Surgical History:  Breast lumpectomy  Cardiac  catheterization  Colonoscopy  CABG  Tubal abdominal ligation    Family History:   Breast cancer  Diabetes  Hypertension    Social History:   Every day smoker.  No alcohol.    Home Medications:  Accu-Chek FastClix Lancets, Aspirin, Biotin, Insulin Pen Needle, Magnesium, Semaglutide(0.25 or 0.5MG/DOS), albuterol sulfate HFA, atorvastatin, docusate sodium, escitalopram, fish oil, gabapentin, glucose blood, insulin glargine, letrozole, metoprolol tartrate, montelukast, omeprazole, pentoxifylline, and senna    Allergies:  Allergies   Allergen Reactions    Cilostazol Shortness Of Breath    Sulfa Antibiotics Hives       Objective   Objective     Vitals:   Temp:  [98.5 °F (36.9 °C)] 98.5 °F (36.9 °C)  Heart Rate:  [] 102  Resp:  [18-27] 25  BP: (137-145)/(53-65) 145/62  Flow (L/min) (Oxygen Therapy):  [2] 2    Physical Exam    Constitutional: Chronically ill-appearing   Eyes: Pupils equal, sclerae anicteric, no conjunctival injection   HENT: NCAT, mucous membranes moist   Neck: Supple, no thyromegaly, no lymphadenopathy, trachea midline   Respiratory: Decreased breath sounds the right upper lobe.  Rhonchorous throughout   Cardiovascular: RRR, no murmurs, rubs, or gallops, palpable pedal pulses bilaterally   Gastrointestinal: Positive bowel sounds, soft, nontender, nondistended   Musculoskeletal: No bilateral ankle edema, no clubbing or cyanosis to extremities   Psychiatric: Appropriate affect, cooperative   Neurologic: Oriented x 3, strength symmetric in all extremities, Cranial Nerves grossly intact to confrontation, speech clear   Skin: No rashes     Result Review    Result Review:  I have personally reviewed the results from the time of this admission to 1/11/2025 20:33 EST and agree with these findings:  Imaging and labs reviewed      Assessment & Plan   Assessment / Plan     Assessment/Plan:   Acute on chronic hypoxic respiratory failure  Right upper lobe pneumonia  Acute COPD exacerbation on home oxygen  History  of breast cancer on hormone blocker  Type 2 diabetes hyperglycemia  Peripheral vascular disease  Coronary artery disease    Plan:  -- Admit to hospital service  -- Continue ox for saturation less than 90%  -- I reviewed chest x-ray and if patient does not show improvement in the next 1 to 2 days we will consider CT scan because she has a pretty significant lobar pneumonia in the right upper lobe to make sure there is no other possible etiology this more sinister.  -- Continue ceftriaxone azithromycin  -- Procalcitonin now and in the morning  -- Mycoplasma  -- Strep and Legionella urine antigen  -- Respiratory culture  -- Brovana/Pulmicort/DuoNebs  -- Prednisone 40 mg  -- Sliding scale for type 2 diabetes with hyperglycemia      VTE Prophylaxis:  Lovenox        CODE STATUS:     Full code    Admission Status:  I believe this patient meets admission status.    Electronically signed by Connor Guerra MD, 01/11/25, 8:33 PM EST.

## 2025-01-12 NOTE — PLAN OF CARE
Goal Outcome Evaluation:              Outcome Evaluation: Pt. is alert and oriented x4 on 2L NC. Vital signs stable throughout shift. Patient rested well throughout the night. Continue care per Plan of Care.

## 2025-01-12 NOTE — PLAN OF CARE
Goal Outcome Evaluation:  Plan of Care Reviewed With: patient        Progress: improving  Outcome Evaluation: Patient is alert and oriented  x4 on 2L n/c. No complaints of pain throughout the shift. Patient was a transfer to 4NT this shift. Blood glucose monitored. PEACE hose applied to patient as ordered. No new issues at this time.

## 2025-01-12 NOTE — PROGRESS NOTES
Respiratory Therapist Broncho-Pulmonary Hygiene Progress Note      Patient Name:  Gini Edmondson  YOB: 1950    Gnii Edmondson meets the qualification for Level 1 of the [facility]. This was based on my daily patient assessment and includes review of chest x-ray results, cough ability and quality, oxygenation, secretions or risk for secretion development and patient mobility.     Broncho-Pulmonary Hygiene Assessment:    Level of Movement: Actively changing positions without assistance  Alert/ oriented/ cooperative    Breath Sounds: Clear to slightly diminished    Cough: Strong, effective    Chest X-Ray: Possible signs of consolidation and/or atelectasis or clear.     Sputum Productions: Able to produce small to moderate amount, of moderately thick secretions    History and Physical: Chronic condition    SpO2 to Oxygen Need: greater than 92% on room air or  less than 3L nasal canula    Current SpO2 is: 96% on 2L    Based on this information, I have completed the following interventions: Aerobika with bronchodialtor medication or TID      Electronically signed by Wesley Pendleton, 01/11/25, 11:33 PM EST.

## 2025-01-13 LAB
ANION GAP SERPL CALCULATED.3IONS-SCNC: 9.5 MMOL/L (ref 5–15)
B PARAPERT DNA SPEC QL NAA+PROBE: NOT DETECTED
B PERT DNA SPEC QL NAA+PROBE: NOT DETECTED
BACTERIA SPEC AEROBE CULT: NORMAL
BUN SERPL-MCNC: 18 MG/DL (ref 8–23)
BUN/CREAT SERPL: 28.6 (ref 7–25)
C PNEUM DNA NPH QL NAA+NON-PROBE: NOT DETECTED
CALCIUM SPEC-SCNC: 8.8 MG/DL (ref 8.6–10.5)
CHLORIDE SERPL-SCNC: 97 MMOL/L (ref 98–107)
CO2 SERPL-SCNC: 26.5 MMOL/L (ref 22–29)
CREAT SERPL-MCNC: 0.63 MG/DL (ref 0.57–1)
DEPRECATED RDW RBC AUTO: 40.8 FL (ref 37–54)
EGFRCR SERPLBLD CKD-EPI 2021: 92.6 ML/MIN/1.73
ERYTHROCYTE [DISTWIDTH] IN BLOOD BY AUTOMATED COUNT: 13.1 % (ref 12.3–15.4)
FLUAV SUBTYP SPEC NAA+PROBE: NOT DETECTED
FLUBV RNA ISLT QL NAA+PROBE: NOT DETECTED
GLUCOSE BLDC GLUCOMTR-MCNC: 181 MG/DL (ref 70–99)
GLUCOSE BLDC GLUCOMTR-MCNC: 213 MG/DL (ref 70–99)
GLUCOSE BLDC GLUCOMTR-MCNC: 236 MG/DL (ref 70–99)
GLUCOSE BLDC GLUCOMTR-MCNC: 261 MG/DL (ref 70–99)
GLUCOSE SERPL-MCNC: 282 MG/DL (ref 65–99)
HADV DNA SPEC NAA+PROBE: NOT DETECTED
HCOV 229E RNA SPEC QL NAA+PROBE: NOT DETECTED
HCOV HKU1 RNA SPEC QL NAA+PROBE: NOT DETECTED
HCOV NL63 RNA SPEC QL NAA+PROBE: NOT DETECTED
HCOV OC43 RNA SPEC QL NAA+PROBE: NOT DETECTED
HCT VFR BLD AUTO: 34.6 % (ref 34–46.6)
HGB BLD-MCNC: 11.3 G/DL (ref 12–15.9)
HMPV RNA NPH QL NAA+NON-PROBE: NOT DETECTED
HPIV1 RNA ISLT QL NAA+PROBE: NOT DETECTED
HPIV2 RNA SPEC QL NAA+PROBE: NOT DETECTED
HPIV3 RNA NPH QL NAA+PROBE: NOT DETECTED
HPIV4 P GENE NPH QL NAA+PROBE: NOT DETECTED
M PNEUMO IGG SER IA-ACNC: NOT DETECTED
MAGNESIUM SERPL-MCNC: 2 MG/DL (ref 1.6–2.4)
MCH RBC QN AUTO: 27.9 PG (ref 26.6–33)
MCHC RBC AUTO-ENTMCNC: 32.7 G/DL (ref 31.5–35.7)
MCV RBC AUTO: 85.4 FL (ref 79–97)
PLATELET # BLD AUTO: 350 10*3/MM3 (ref 140–450)
PMV BLD AUTO: 10.4 FL (ref 6–12)
POTASSIUM SERPL-SCNC: 3.8 MMOL/L (ref 3.5–5.2)
RBC # BLD AUTO: 4.05 10*6/MM3 (ref 3.77–5.28)
RHINOVIRUS RNA SPEC NAA+PROBE: NOT DETECTED
RSV RNA NPH QL NAA+NON-PROBE: NOT DETECTED
SARS-COV-2 RNA RESP QL NAA+PROBE: NOT DETECTED
SODIUM SERPL-SCNC: 133 MMOL/L (ref 136–145)
WBC NRBC COR # BLD AUTO: 15.31 10*3/MM3 (ref 3.4–10.8)

## 2025-01-13 PROCEDURE — 94664 DEMO&/EVAL PT USE INHALER: CPT

## 2025-01-13 PROCEDURE — 85027 COMPLETE CBC AUTOMATED: CPT | Performed by: INTERNAL MEDICINE

## 2025-01-13 PROCEDURE — 83735 ASSAY OF MAGNESIUM: CPT | Performed by: INTERNAL MEDICINE

## 2025-01-13 PROCEDURE — 63710000001 PREDNISONE PER 1 MG: Performed by: INTERNAL MEDICINE

## 2025-01-13 PROCEDURE — 25010000002 CEFTRIAXONE PER 250 MG: Performed by: INTERNAL MEDICINE

## 2025-01-13 PROCEDURE — 82948 REAGENT STRIP/BLOOD GLUCOSE: CPT

## 2025-01-13 PROCEDURE — 80048 BASIC METABOLIC PNL TOTAL CA: CPT | Performed by: INTERNAL MEDICINE

## 2025-01-13 PROCEDURE — 63710000001 INSULIN LISPRO (HUMAN) PER 5 UNITS: Performed by: INTERNAL MEDICINE

## 2025-01-13 PROCEDURE — 94799 UNLISTED PULMONARY SVC/PX: CPT

## 2025-01-13 PROCEDURE — 92610 EVALUATE SWALLOWING FUNCTION: CPT

## 2025-01-13 PROCEDURE — 82948 REAGENT STRIP/BLOOD GLUCOSE: CPT | Performed by: INTERNAL MEDICINE

## 2025-01-13 PROCEDURE — 25010000002 ENOXAPARIN PER 10 MG: Performed by: INTERNAL MEDICINE

## 2025-01-13 PROCEDURE — 63710000001 INSULIN GLARGINE PER 5 UNITS: Performed by: INTERNAL MEDICINE

## 2025-01-13 PROCEDURE — 99232 SBSQ HOSP IP/OBS MODERATE 35: CPT | Performed by: INTERNAL MEDICINE

## 2025-01-13 PROCEDURE — 0202U NFCT DS 22 TRGT SARS-COV-2: CPT | Performed by: STUDENT IN AN ORGANIZED HEALTH CARE EDUCATION/TRAINING PROGRAM

## 2025-01-13 PROCEDURE — 99222 1ST HOSP IP/OBS MODERATE 55: CPT | Performed by: STUDENT IN AN ORGANIZED HEALTH CARE EDUCATION/TRAINING PROGRAM

## 2025-01-13 RX ORDER — METOPROLOL TARTRATE 25 MG/1
25 TABLET, FILM COATED ORAL DAILY
Status: DISCONTINUED | OUTPATIENT
Start: 2025-01-13 | End: 2025-01-15 | Stop reason: HOSPADM

## 2025-01-13 RX ORDER — GUAIFENESIN 200 MG/10ML
200 LIQUID ORAL EVERY 4 HOURS PRN
Status: DISCONTINUED | OUTPATIENT
Start: 2025-01-13 | End: 2025-01-15 | Stop reason: HOSPADM

## 2025-01-13 RX ORDER — MONTELUKAST SODIUM 10 MG/1
10 TABLET ORAL NIGHTLY
Status: DISCONTINUED | OUTPATIENT
Start: 2025-01-13 | End: 2025-01-15 | Stop reason: HOSPADM

## 2025-01-13 RX ORDER — BENZONATATE 100 MG/1
200 CAPSULE ORAL 3 TIMES DAILY PRN
Status: DISCONTINUED | OUTPATIENT
Start: 2025-01-13 | End: 2025-01-15 | Stop reason: HOSPADM

## 2025-01-13 RX ORDER — ASPIRIN 81 MG/1
81 TABLET ORAL DAILY
Status: DISCONTINUED | OUTPATIENT
Start: 2025-01-13 | End: 2025-01-15 | Stop reason: HOSPADM

## 2025-01-13 RX ORDER — ATORVASTATIN CALCIUM 10 MG/1
10 TABLET, FILM COATED ORAL NIGHTLY
Status: DISCONTINUED | OUTPATIENT
Start: 2025-01-13 | End: 2025-01-15 | Stop reason: HOSPADM

## 2025-01-13 RX ORDER — PANTOPRAZOLE SODIUM 40 MG/1
40 TABLET, DELAYED RELEASE ORAL
Status: DISCONTINUED | OUTPATIENT
Start: 2025-01-14 | End: 2025-01-15 | Stop reason: HOSPADM

## 2025-01-13 RX ADMIN — IPRATROPIUM BROMIDE AND ALBUTEROL SULFATE 3 ML: 2.5; .5 SOLUTION RESPIRATORY (INHALATION) at 11:38

## 2025-01-13 RX ADMIN — INSULIN LISPRO 3 UNITS: 100 INJECTION, SOLUTION INTRAVENOUS; SUBCUTANEOUS at 09:09

## 2025-01-13 RX ADMIN — SODIUM CHLORIDE 1000 MG: 9 INJECTION INTRAMUSCULAR; INTRAVENOUS; SUBCUTANEOUS at 09:09

## 2025-01-13 RX ADMIN — INSULIN LISPRO 3 UNITS: 100 INJECTION, SOLUTION INTRAVENOUS; SUBCUTANEOUS at 12:48

## 2025-01-13 RX ADMIN — BUDESONIDE 0.5 MG: 0.5 INHALANT RESPIRATORY (INHALATION) at 06:20

## 2025-01-13 RX ADMIN — ARFORMOTEROL TARTRATE 15 MCG: 15 SOLUTION RESPIRATORY (INHALATION) at 18:39

## 2025-01-13 RX ADMIN — IPRATROPIUM BROMIDE AND ALBUTEROL SULFATE 3 ML: 2.5; .5 SOLUTION RESPIRATORY (INHALATION) at 06:20

## 2025-01-13 RX ADMIN — IPRATROPIUM BROMIDE AND ALBUTEROL SULFATE 3 ML: 2.5; .5 SOLUTION RESPIRATORY (INHALATION) at 03:18

## 2025-01-13 RX ADMIN — PREDNISONE 40 MG: 20 TABLET ORAL at 09:09

## 2025-01-13 RX ADMIN — GABAPENTIN 100 MG: 100 CAPSULE ORAL at 20:15

## 2025-01-13 RX ADMIN — MONTELUKAST 10 MG: 10 TABLET, FILM COATED ORAL at 20:15

## 2025-01-13 RX ADMIN — INSULIN LISPRO 2 UNITS: 100 INJECTION, SOLUTION INTRAVENOUS; SUBCUTANEOUS at 20:15

## 2025-01-13 RX ADMIN — INSULIN GLARGINE 25 UNITS: 100 INJECTION, SOLUTION SUBCUTANEOUS at 09:09

## 2025-01-13 RX ADMIN — ASPIRIN 81 MG: 81 TABLET, COATED ORAL at 15:55

## 2025-01-13 RX ADMIN — ATORVASTATIN CALCIUM 10 MG: 10 TABLET, FILM COATED ORAL at 20:15

## 2025-01-13 RX ADMIN — GABAPENTIN 100 MG: 100 CAPSULE ORAL at 09:51

## 2025-01-13 RX ADMIN — Medication 10 MG: at 21:03

## 2025-01-13 RX ADMIN — AZITHROMYCIN DIHYDRATE 500 MG: 250 TABLET, FILM COATED ORAL at 09:09

## 2025-01-13 RX ADMIN — BUDESONIDE 0.5 MG: 0.5 INHALANT RESPIRATORY (INHALATION) at 18:39

## 2025-01-13 RX ADMIN — GUAIFENESIN 200 MG: 100 LIQUID ORAL at 18:19

## 2025-01-13 RX ADMIN — Medication 10 ML: at 20:16

## 2025-01-13 RX ADMIN — NYSTATIN 500000 UNITS: 100000 SUSPENSION ORAL at 12:48

## 2025-01-13 RX ADMIN — INSULIN LISPRO 4 UNITS: 100 INJECTION, SOLUTION INTRAVENOUS; SUBCUTANEOUS at 18:19

## 2025-01-13 RX ADMIN — ENOXAPARIN SODIUM 40 MG: 100 INJECTION SUBCUTANEOUS at 09:10

## 2025-01-13 RX ADMIN — PENTOXIFYLLINE 400 MG: 400 TABLET, EXTENDED RELEASE ORAL at 20:15

## 2025-01-13 RX ADMIN — NYSTATIN 500000 UNITS: 100000 SUSPENSION ORAL at 20:15

## 2025-01-13 RX ADMIN — IPRATROPIUM BROMIDE AND ALBUTEROL SULFATE 3 ML: 2.5; .5 SOLUTION RESPIRATORY (INHALATION) at 15:12

## 2025-01-13 RX ADMIN — IPRATROPIUM BROMIDE AND ALBUTEROL SULFATE 3 ML: 2.5; .5 SOLUTION RESPIRATORY (INHALATION) at 18:39

## 2025-01-13 RX ADMIN — BENZONATATE 200 MG: 100 CAPSULE ORAL at 15:55

## 2025-01-13 RX ADMIN — Medication 10 ML: at 09:11

## 2025-01-13 RX ADMIN — ARFORMOTEROL TARTRATE 15 MCG: 15 SOLUTION RESPIRATORY (INHALATION) at 06:20

## 2025-01-13 RX ADMIN — PENTOXIFYLLINE 400 MG: 400 TABLET, EXTENDED RELEASE ORAL at 09:13

## 2025-01-13 RX ADMIN — METOPROLOL TARTRATE 25 MG: 25 TABLET, FILM COATED ORAL at 15:55

## 2025-01-13 RX ADMIN — NYSTATIN 500000 UNITS: 100000 SUSPENSION ORAL at 09:09

## 2025-01-13 RX ADMIN — NYSTATIN 500000 UNITS: 100000 SUSPENSION ORAL at 18:19

## 2025-01-13 RX ADMIN — GABAPENTIN 100 MG: 100 CAPSULE ORAL at 15:55

## 2025-01-13 NOTE — THERAPY EVALUATION
Acute Care - Speech Language Pathology   Swallow Initial Evaluation  [facility]     Patient Name: Gini Edmondson  : 1950  MRN: [id number]  Today's Date: 2025               Admit Date: 2025    Visit Dx:     ICD-10-CM ICD-9-CM   1. Pneumonia of right upper lobe due to infectious organism  J18.9 486   2. Chronic obstructive pulmonary disease, unspecified COPD type  J44.9 496   3. Chronic respiratory failure with hypoxia  J96.11 518.83     799.02   4. Uncontrolled type 2 diabetes mellitus with hyperglycemia  E11.65 250.02   5. Dysphagia, unspecified type  R13.10 787.20     Patient Active Problem List   Diagnosis    PAD (peripheral artery disease)    Asthma    Chronic obstructive pulmonary disease    Depression    Diabetes mellitus, type II    Hypertension, essential    Goiter    Raynaud's syndrome    Malignant neoplasm of lower-outer quadrant of left breast of female, estrogen receptor positive    Abdominal pain    Personal history of colonic polyps    Pneumonia     Past Medical History:   Diagnosis Date    Allergy to sunlight     PER PT    Asthma     Cancer of left breast     INVASVIE LOBULAR CARCINOMA    Cardiac disorder     DR. MALCOLM ANTHONY    Condition not found     RIGHT EYE CATARACT (LT CATARACT REPAIRED)    Constipation     COPD (chronic obstructive pulmonary disease)     Diabetes mellitus     Elevated cholesterol     Heart attack     3-22-18 NON STEMI, CABG 3-23-18    Hx of bronchitis     SMOKES 5-7 CIGERETTS PER DAY    Hx of CABG     3V CABG     Hypertension     MEDS CONTROL    Neuropathy     IN FEETH FROM DM    PAD (peripheral artery disease)     Raynaud's syndrome     Status post left breast lumpectomy      Past Surgical History:   Procedure Laterality Date    BREAST LUMPECTOMY      LEFT    CARDIAC CATHETERIZATION N/A 9/15/2022    Procedure: Aortogram with bilateral leg angiogram, possible angioplasty or stenting;  Surgeon: Jose Armando Mcbride MD;  Location: [facility] INVASIVE LOC[facility];   "Service: Vascular;  Laterality: N/A;    COLONOSCOPY      COLONOSCOPY N/A 7/9/2024    Procedure: COLONOSCOPY WITH BIOPSY POLYPECTOMY;  Surgeon: Dheeraj Cho MD;  Location: [facility] ENDOSCOPY;  Service: General;  Laterality: N/A;  COLON POLYP, DIVERTICULOSIS    CORONARY ARTERY BYPASS GRAFT      LAPAROSCOPIC TUBAL LIGATION      OTHER SURGICAL HISTORY      BILAT PAD LEGS    OTHER SURGICAL HISTORY      CHIP IN LEFT BREAST, MARKER FOR SPOT    OTHER SURGICAL HISTORY      LEFT KNEE REPAIR       SLP Recommendation and Plan          Inpatient Speech Pathology Dysphagia Evaluation        PAIN SCALE: Patient did not indicate that she was having any pain.     PRECAUTIONS/CONTRAINDICATIONS:  Aspiration precautions. Fall precautions. Standard precautions.     SUSPECTED ABUSE/NEGLECT/EXPLOITATION:  No    PRIOR FUNCTION:  Patient reported difficulty with globus sensation since she began taking Ozempic. Patient reported that prior to taking Ozempic she was within functional limits.     PATIENT GOALS/EXPECTATIONS:  To consume least restrictive diet without signs/symptoms of aspiration. To identify type and severity of current deficits and provide appropriate treatment.     HISTORY:  Gini Edmondson is a 74 y.o. female with medical history of type 2 diabetes, dyslipidemia, history of breast cancer, coronary artery disease history of MI and CABG, COPD on home oxygen, and peripheral vascular disease who presented to the emergency department with cough and shortness of breath. Chest CT reported, \"Consolidation in the posterior right upper lung measuring 6.3 x 6.2 cm which may reflect pneumonia with reactive right hilar lymphadenopathy however right hilar neoplastic process cannot be excluded and treatment and follow-up imaging to ensure resolution is recommended.\"    CURRENT DIET LEVEL:  Level 0 thin liquids and level 7 regular solids.     OBJECTIVE:    TEST ADMINISTERED:  Oral Mechanisms Exam and Clinical Swallow Evaluation. "     COGNITION/SAFETY AWARENESS:  Patient appeared to have normal cognition as she asked about discharge planning, safety, etc. while conversing with SLP.     BEHAVIORAL OBSERVATIONS:  Patient was pleasant and cooperative. Patient had good topic maintenance and was appropriate during conversation. Patient reported that she began to have difficulty with swallow when she start to take Ozempic. Patient reported that she stopped taking Ozempic in December but the issues have persisted. Patient reported significantly reduced appetite and reported globus sensation in the lower throat and chest when eating. Based on these symptoms, patient's symptoms may be GI related.     ORAL MOTOR EXAM:  Patient's oral mechanisms appeared to be within normal limits. No one-sided weakness noted.     VOICE QUALITY:  Normal.     REFLEX EXAM:  Velopharyngeal function appeared to be within functional limits.     POSTURE:  Upright for oral care and when eating or drinking.     FEEDING/SWALLOWING FUNCTION: Oral care provided prior to trials of various consistencies. Patient participated in clinical swallow evaluation and trialed level 0 thin liquids via cup and straw, level 4 pureed solids, level 6 soft and bite sized solids, and level 7 regular solids.  Patient did not demonstrate any overt signs/symptoms of aspiration with any of the consistencies trialed.     CLINICAL OBSERVATIONS:  Patient demonstrated good hyolaryngeal excursion, timing, and mastication. Patient's complaint of globus sensation does not appear related to the function of her swallow. Physician may wish to consider an esophagram or GI consult to rule out esophageal dysphagia or other GI issues.    DYSPHAGIA CRITERIA:  Globus sensation.    FUNCTIONAL ASSESSMENT INSTRUMENT: Patient currently scored a level 6 of 7 on Functional Communication Measures for swallowing indicating a 15% limitation in function.    ASSESSMENT/ PLAN OF CARE:  Patient does not require follow up services  at this time. Patient would likely benefit from a GI consult or esophagram as patient's complaint of globus sensation appears to be more esophageal.     RECOMMENDATIONS:   1.   DIET: Level 0 thin liquids and level 7 regular solids.     2.  POSITION: Upright for oral care and when eating or drinking.     3.  COMPENSATORY STRATEGIES: Eat at a slow rate and take small bites and sips. Complete oral care frequently.     Pt/responsible party agrees with plan of care and has been informed of all alternatives, risks and benefits.                                                                                    EDUCATION  The patient has been educated in the following areas:   Dysphagia (Swallowing Impairment).                Time Calculation:    Time Calculation- SLP       Row Name 01/13/25 0745             Time Calculation- SLP    SLP Start Time 0745  -AW      SLP Stop Time 0845  -AW      SLP Time Calculation (min) 60 min  -AW      SLP Received On 01/13/25  -AW         Untimed Charges    [id number] Eval Oral Pharyng Swallow Minutes 60  -AW      [id number] Treatment Swallow Minutes --  -AW         Total Minutes    Untimed Charges Total Minutes 60  -AW       Total Minutes 60  -AW                User Key  (r) = Recorded By, (t) = Taken By, (c) = Cosigned By      Initials Name Provider Type    Ame Browning SLP Speech and Language Pathologist                    Therapy Charges for Today       Code Description Service Date Service Provider Modifiers Qty    [id number] HC ST EVAL ORAL PHARYNG SWALLOW 4 1/13/2025 Ame Chong SLP GN 1                 LARS Sultana  1/13/2025

## 2025-01-13 NOTE — CONSULTS
Pulmonary / Critical Care Consult Note      Patient Name: Gini Edmondson  : 1950  MRN: [id number]  Primary Care Physician:  Cheyenne Russell APRN  Referring Physician: Jaun Davis MD  Date of admission: 2025    Subjective   Subjective     Reason for Consult/ Chief Complaint:   Pneumonia, abnormal chest CT    HPI:  Gini Edmondson is a 74 y.o. female with history of breast cancer, CAD status post CABG, COPD, presented to the ED for shortness of breath, cough, subjective chills at home.  Patient reports feeling unwell for about a week.  Last few days she has reported worsening shortness of breath, particularly when she exerts herself.  In the ED she was found to be tachycardic and with SpO2 90% on room air.  Labs showed leukocytosis with WBC of 13.  Microbiology including COVID-19, influenza, RSV negative.  Procalcitonin 0.76.  Chest x-ray showed right upper lobe pneumonia.  Chest CT showed dense consolidation in the right upper lobe with possible reactive lymphadenopathy.  Today in her room patient is on 1.5 L nasal cannula.  She does not use any oxygen at home.  She does still have a cough that is mostly unproductive.  She says she is unable to get it out.  She smokes about a pack per week.  She has nebulizers at home and rescue inhaler.  She does not use any maintenance inhalers at this time.    Review of Systems  Constitutional symptoms:  Denied complaints   Cardiovascular:  Denied complaints  Respiratory: + Cough; denied complaints  Gastrointestinal: Denied complaints  Neurologic: Denied complaints    Personal History     Past Medical History:   Diagnosis Date    Allergy to sunlight     PER PT    Asthma     Cancer of left breast     INVASVIE LOBULAR CARCINOMA    Cardiac disorder     DR. MALCOLM ANTHONY    Condition not found     RIGHT EYE CATARACT (LT CATARACT REPAIRED)    Constipation     COPD (chronic obstructive pulmonary disease)     Diabetes mellitus     Elevated cholesterol      Heart attack     3-22-18 NON STEMI, CABG 3-23-18    Hx of bronchitis     SMOKES 5-7 CIGERETTS PER DAY    Hx of CABG     3V CABG 2018    Hypertension     MEDS CONTROL    Neuropathy     IN FEETH FROM DM    PAD (peripheral artery disease)     Raynaud's syndrome     Status post left breast lumpectomy        Past Surgical History:   Procedure Laterality Date    BREAST LUMPECTOMY      LEFT    CARDIAC CATHETERIZATION N/A 9/15/2022    Procedure: Aortogram with bilateral leg angiogram, possible angioplasty or stenting;  Surgeon: Jose Armando Mcbride MD;  Location: [facility] CATH INVASIVE LOCATION;  Service: Vascular;  Laterality: N/A;    COLONOSCOPY      COLONOSCOPY N/A 7/9/2024    Procedure: COLONOSCOPY WITH BIOPSY POLYPECTOMY;  Surgeon: Dheeraj Cho MD;  Location: [facility] ENDOSCOPY;  Service: General;  Laterality: N/A;  COLON POLYP, DIVERTICULOSIS    CORONARY ARTERY BYPASS GRAFT      LAPAROSCOPIC TUBAL LIGATION      OTHER SURGICAL HISTORY      BILAT PAD LEGS    OTHER SURGICAL HISTORY      CHIP IN LEFT BREAST, MARKER FOR SPOT    OTHER SURGICAL HISTORY      LEFT KNEE REPAIR       Family History: family history includes Breast cancer in her maternal aunt, maternal aunt, and paternal aunt; Diabetes in her brother, father, and another family member; Hypertension in an other family member; Pancreatic cancer in her brother; Prostate cancer in her father; Stroke in her mother. Otherwise pertinent FHx was reviewed and not pertinent to current issue.    Social History:  reports that she has been smoking cigarettes. She started smoking about 54 years ago. She has a 27.3 pack-year smoking history. She has never used smokeless tobacco. She reports that she does not currently use alcohol. She reports that she does not use drugs.    Home Medications:  Aspirin, Biotin, Magnesium, albuterol sulfate HFA, atorvastatin, fish oil, gabapentin, insulin glargine, letrozole, metoprolol tartrate, montelukast, omeprazole, pentoxifylline, and  senna    Allergies:  Allergies   Allergen Reactions    Cilostazol Shortness Of Breath    Sulfa Antibiotics Hives       Objective    Objective     Vitals:   Temp:  [97 °F (36.1 °C)-99 °F (37.2 °C)] 97 °F (36.1 °C)  Heart Rate:  [] 96  Resp:  [14-22] 14  BP: (120-173)/(57-80) 124/60  Flow (L/min) (Oxygen Therapy):  [1.5-2] 1.5    Physical Exam:  Vital Signs Reviewed   General:  Alert, NAD. Lying in bed.    HEENT:  PERRL, EOMI.  OP  Neck:  Supple, no JVD, no thyromegaly  Chest: Diminished right greater than left on auscultation, no work of breathing noted on 1.5 L nasal cannula  CV: RRR, no MGR, pulses 2+, equal.  Abd:  Soft, NT, ND, + BS  EXT:  no clubbing, no cyanosis, no edema, no joint tenderness  Neuro:  A&Ox3, CN grossly intact, no focal deficits.  Skin: No rashes or lesions noted    Result Review    Result Review:  I have personally reviewed the results from the time of this admission to 1/13/2025 16:28 EST and agree with these findings:  []  Laboratory  []  Microbiology  []  Radiology  []  EKG/Telemetry   []  Cardiology/Vascular   []  Pathology  []  Old records  []  Other:    Most notable findings include:   -     Assessment & Plan   Assessment / Plan     Active Hospital Problems:  Active Hospital Problems    Diagnosis     **Pneumonia        Impression:  Acute hypoxic respiratory failure  Right upper lobe pneumonia  Abnormal chest CT  COPD exacerbation  History of breast cancer on hormonal therapy  History of diabetes  History of CAD status post CABG    Plan:  Continue supplemental oxygen to keep SpO2 greater than 90%.  Will check respiratory viral panel.  Flu, COVID, RSV negative.  Chest CT personally reviewed.  There is dense right upper lobe consolidation which may reflect pneumonia with reactive lymphadenopathy.  However given her history of breast cancer on hormone blocker, neoplastic process is certainly in the differential.  Because of this I will take her for EBUS bronchoscopy tomorrow.  We  discussed risk and benefits of procedure.  Risk include bleeding, infection, pneumothorax, and even death can occur.  Patient expressed understanding and would like to proceed.  N.p.o. at midnight for procedure tomorrow.  Hold anticoagulation today.    Continue antibiotics for now for CAP coverage  Continue prednisone for now  Start nebs and bronchopulmonary hygiene    VTE Prophylaxis:  Pharmacologic VTE prophylaxis orders are present.         Code Status and Medical Interventions: CPR (Attempt to Resuscitate); Full Support   Ordered at: 01/11/25 4291     Level Of Support Discussed With:    Patient     Code Status (Patient has no pulse and is not breathing):    CPR (Attempt to Resuscitate)     Medical Interventions (Patient has pulse or is breathing):    Full Support        Labs, imaging, notes, and medications personally reviewed.  Thank you for involving me in the care of this patient.  Electronically signed by Simi Birmingham MD, 01/13/25, 4:28 PM EST.

## 2025-01-13 NOTE — PLAN OF CARE
Goal Outcome Evaluation:  Plan of Care Reviewed With: patient        Progress: improving  Outcome Evaluation: Pt A&O x4. O2 sats stable on 2 L via NC.  Pt refuses bed alert. Educated on fall risk, call light use, requested patient to notify staff when she needed to use bathroom. Pt did ambulate to bathroom with staff x2. Yellow socks placed on patient. Pt requested PEACE hose to be removed. Pt complained of left leg pain/cramps. Contacted Halie Simmons, on call hospitalist. Home meds Gabapentin and Trental started and administered. Pt reported not sleeping well. BG checks 211, 352, SSi administered per parameter. Pt in bed resting, call light in reach.

## 2025-01-13 NOTE — PROGRESS NOTES
[facility]   Hospitalist Progress Note  Date: 2025  Patient Name: Gini Edmondson  : 1950  MRN: [id number]  Date of admission: 2025  Room/Bed: Formerly named [facility]      Subjective   Subjective     Chief Complaint:   Shortness of breath    Summary:  Gini Edmondson is a 74 y.o. female with medical history of type 2 diabetes, dyslipidemia, history of breast cancer, coronary artery disease history of MI and CABG, COPD on home oxygen, and peripheral vascular disease who presents emergency department with cough and shortness of breath x 7 days     Patient states she started feeling poorly about 7 days prior. She has progressively become more short of breath and feels like her oxygen drops when she gets to move around.   She is also coughing up very brownish thick sputum.  She does not note having any fevers.  Or any chills.  Patient presented to the emergency department as she had not been improving at home. In the emergency department the patient's vital signs are as follows: Temperature is 98.5, pulse 102, respiratory 25, blood pressure 145/62, and satting 90% on room air.  CBC shows a white count of 13.  CMP shows a glucose 256.  Sodium 132.  COVID-19 and influenza are negative.  Rapid strep is negative.  Chest x-ray shows right upper lobe pneumonia.  She received ceftriaxone azithromycin.  Blood cultures were sent.  Patient admitted to hospital for acute on chronic hypoxic respiratory failure with community-acquired pneumonia. Obtained CT of chest, shows consolidation in the posterior right upper lung measuring 6.3 x 6.2 cm which may reflect pneumonia with reactive right hilar lymphadenopathy however right hilar neoplastic process cannot be excluded and treatment and follow-up imaging to ensure resolution is recommended.      Interval Followup:   Patient seen by speech therapy today, recommended patient have esophagram however patient not interested.  States its likely just the Ozempic requesting to  be discharged off of Ozempic and back on a regiment of insulin.  Patient continues on nystatin swish and spit.  Given severity of patient's pneumonia on CT will pulmonology service see patient    Objective   Objective     Vitals:   Temp:  [97.2 °F (36.2 °C)-99 °F (37.2 °C)] 97.9 °F (36.6 °C)  Heart Rate:  [] 96  Resp:  [14-22] 14  BP: (120-173)/(57-80) 120/80  Flow (L/min) (Oxygen Therapy):  [1.5-2] 1.5    Physical Exam               Constitutional: Resting in bed comfortably today              Eyes: Pupils equal, sclerae anicteric, no conjunctival injection              HENT: NCAT, mucous membranes moist, no further thrush seen on tongue              Neck: Supple, no thyromegaly, no lymphadenopathy, trachea midline              Respiratory: Decreased breath sounds the right upper lobe.  Rhonchorous throughout, no crackles              Cardiovascular: RRR, no murmurs, rubs, or gallops, palpable pedal pulses bilaterally              Gastrointestinal: Positive bowel sounds, soft, nontender, nondistended              Musculoskeletal: No bilateral ankle edema, no clubbing or cyanosis to extremities              Neurologic: Oriented x 3, strength symmetric in all extremities, Cranial Nerves grossly intact to confrontation, speech clear              Skin: No rashes        Result Review    Result Review:  I have personally reviewed these results:  [x]  Laboratory      Lab 01/13/25  0604 01/12/25  0320 01/11/25  2320 01/11/25 2015 01/11/25  1830   WBC 15.31* 11.90*  --   --  13.59*   HEMOGLOBIN 11.3* 12.3  --   --  12.7   HEMATOCRIT 34.6 38.1  --   --  40.2   PLATELETS 350 322  --   --  333   NEUTROS ABS  --  10.73*  --   --  9.71*   IMMATURE GRANS (ABS)  --  0.05  --   --  0.08*   LYMPHS ABS  --  0.68*  --   --  1.57   MONOS ABS  --  0.41  --   --  1.88*   EOS ABS  --  0.00  --   --  0.31   MCV 85.4 86.6  --   --  87.0   PROCALCITONIN  --  0.67*  --  0.76*  --    LACTATE  --   --  1.8 2.3*  --          Lab  01/13/25  0604 01/12/25  0320 01/11/25  1830   SODIUM 133* 130* 132*   POTASSIUM 3.8 4.5 3.9   CHLORIDE 97* 95* 93*   CO2 26.5 22.3 26.0   ANION GAP 9.5 12.7 13.0   BUN 18 13 15   CREATININE 0.63 0.61 0.75   EGFR 92.6 93.4 83.1   GLUCOSE 282* 319* 256*   CALCIUM 8.8 8.8 9.7   MAGNESIUM 2.0 2.0  --          Lab 01/12/25  0320 01/11/25  1830   TOTAL PROTEIN 6.8 7.5   ALBUMIN 3.1* 3.4*   GLOBULIN 3.7 4.1   ALT (SGPT) 10 9   AST (SGOT) 21 20   BILIRUBIN 0.4 0.5   ALK PHOS 92 102         Lab 01/11/25 2015 01/11/25  1830   PROBNP  --  513.3   HSTROP T 14* 19*                 Brief Urine Lab Results       None          [x]  Microbiology   Microbiology Results (last 10 days)       Procedure Component Value - Date/Time    S. Pneumo Ag Urine or CSF - Urine, Urine, Clean Catch [[id number]]  (Normal) Collected: 01/12/25 1153    Lab Status: Final result Specimen: Urine, Clean Catch Updated: 01/12/25 1425     Strep Pneumo Ag Negative    Legionella Antigen, Urine - Urine, Urine, Clean Catch [[id number]]  (Normal) Collected: 01/12/25 1153    Lab Status: Final result Specimen: Urine, Clean Catch Updated: 01/12/25 1423     LEGIONELLA ANTIGEN, URINE Negative    Respiratory Culture - Sputum, Cough [[id number]] Collected: 01/11/25 2340    Lab Status: Preliminary result Specimen: Sputum from Cough Updated: 01/13/25 1224     Respiratory Culture Scant growth (1+) The culture consists of normal respiratory ping. This is a preliminary report; final report to follow.     Gram Stain Many (4+) WBCs seen      Moderate (3+) Gram positive cocci in pairs and chains      Few (2+) Mucous strands      Moderate (3+) Epithelial cells seen    Blood Culture - Blood, Arm, Left [[id number]]  (Normal) Collected: 01/11/25 2015    Lab Status: Preliminary result Specimen: Blood from Arm, Left Updated: 01/12/25 2046     Blood Culture No growth at 24 hours    Narrative:      Less than seven (7) mL's of blood was collected.  Insufficient quantity may yield false  negative results.    Blood Culture - Blood, Arm, Right [[id number]]  (Normal) Collected: 01/11/25 2015    Lab Status: Preliminary result Specimen: Blood from Arm, Right Updated: 01/12/25 2046     Blood Culture No growth at 24 hours    Narrative:      Less than seven (7) mL's of blood was collected.  Insufficient quantity may yield false negative results.    Mycoplasma Pneumoniae Antibody, IgM - Blood, [[id number]]  (Normal) Collected: 01/11/25 1830    Lab Status: Final result Specimen: Blood Updated: 01/11/25 2123     Mycoplasma pneumo IgM Negative    COVID-19, FLU A/B, RSV PCR 1 HR TAT - Swab, Nasopharynx [[id number]]  (Normal) Collected: 01/11/25 1826    Lab Status: Final result Specimen: Swab from Nasopharynx Updated: 01/11/25 1924     COVID19 Not Detected     Influenza A PCR Not Detected     Influenza B PCR Not Detected     RSV, PCR Not Detected    Narrative:      Fact sheet for providers: https://www.fda.gov/media/831635/download    Fact sheet for patients: https://www.fda.gov/media/981942/download    Test performed by PCR.    Rapid Strep A Screen - Swab, Throat [[id number]]  (Normal) Collected: 01/11/25 1826    Lab Status: Final result Specimen: Swab from Throat Updated: 01/11/25 1906     Strep A Ag Negative    Beta Strep Culture, Throat - Swab, Throat [[id number]]  (Normal) Collected: 01/11/25 1826    Lab Status: Final result Specimen: Swab from Throat Updated: 01/13/25 1146     Throat Culture, Beta Strep No Beta Hemolytic Streptococcus Isolated    Narrative:      Group A Strep incidence is low in adults. Positive culture for Beta hemolytic Streptococcus species can reflect colonization and not true infection. Please correlate clinically.          [x]  Radiology  CT Chest Without Contrast Diagnostic    Result Date: 1/12/2025  Impression: Consolidation in the posterior right upper lung measuring 6.3 x 6.2 cm which may reflect pneumonia with reactive right hilar lymphadenopathy however right hilar neoplastic process  cannot be excluded and treatment and follow-up imaging to ensure resolution is recommended. Follow-up chest CT should be obtained with intravenous contrast for better evaluation of right hilar lesions. Bronchial wall thickening in the right upper and right lower lung with septal thickening associated with inflammatory changes. Electronically Signed: Beatris Sarmiento MD  1/12/2025 9:37 AM EST  Workstation ID: [id number]    XR Chest 1 View    Result Date: 1/11/2025  Impression: Right upper lobe pneumonia Electronically Signed: Pascual Sarmiento MD  1/11/2025 6:30 PM EST  Workstation ID: [id number]   []  EKG/Telemetry   []  Cardiology/Vascular   []  Pathology  []  Old records  []  Other:    Assessment & Plan   Assessment / Plan     Assessment:  Acute on chronic hypoxic respiratory failure  Right upper lobe pneumonia  Acute COPD exacerbation on home oxygen  History of breast cancer on hormone blocker  Type 2 diabetes hyperglycemia  Thrush  Peripheral vascular disease  Coronary artery disease     Plan:  Patient remains admitted to hospital for further care management  CT scan obtained showing consolidation in posterior right upper lung measuring 6.3 x 6.2 cm which may reflect pneumonia with reactive right hilar lymphadenopathy however right hilar neoplastic process cannot be excluded and treatment and follow-up imaging to ensure resolution is recommended.  Given extent of pneumonia, will have pulmonology service see patient.  Patient remains on scheduled and as needed breathing treatments  Continue antibiotics for CAP coverage  Patient has been transitioned to oral prednisone, will continue  Nystatin swish and spit with improvement in thrush  Procalcitonin now downtrending, leukocytosis now downtrending  Continue long-acting and sliding scale insulin, adjust as needed  SLP able to see patient, recommended esophagram, patient not interested in having this performed     Discussed with RN.    VTE Prophylaxis:  Pharmacologic VTE  prophylaxis orders are present.        CODE STATUS:   Level Of Support Discussed With: Patient  Code Status (Patient has no pulse and is not breathing): CPR (Attempt to Resuscitate)  Medical Interventions (Patient has pulse or is breathing): Full Support      Electronically signed by Jaun Davis MD, 1/13/2025, 15:15 EST.

## 2025-01-13 NOTE — CONSULTS
Discharge Planning Assessment  [facility]     Patient Name: Gini Edmondson  MRN: [id number]  Today's Date: 1/13/2025    Admit Date: 1/11/2025    Plan: Pt admitted due to shortness of breath- pneumonia. SW spoke with pt to assess needs. Pt lives at home alone and reports independence in ADLs. Pt transports self to appointments, however reports has had difficulty due to the recent weather. Pt does states she has family nearby that can assist as needed. Pt is currently on 1.5L 02 and does not wear at baseline. Will follow for 6MWT. SW discussed potential need for rehab or HHC. Pt mentioned interest in HHC but would like to see how she is feeling closer to discharge. Discussed any financial concerns and she reports her Ozempic has become expensive, however feels she may no longer be taking this. No other needs/concerns noted at this time. Pharmacy/PCP confirmed.   Discharge Needs Assessment       Row Name 01/13/25 1056       Living Environment    People in Home alone    Unique Family Situation Pt lives in [facility] alone    Current Living Arrangements home    Potentially Unsafe Housing Conditions none    In the past 12 months has the electric, gas, oil, or water company threatened to shut off services in your home? No    Primary Care Provided by self    Provides Primary Care For no one    Family Caregiver if Needed other relative(s)    Quality of Family Relationships supportive;involved;helpful    Able to Return to Prior Arrangements yes       Resource/Environmental Concerns    Resource/Environmental Concerns none       Transportation Needs    In the past 12 months, has lack of transportation kept you from medical appointments or from getting medications? no    In the past 12 months, has lack of transportation kept you from meetings, work, or from getting things needed for daily living? No       Food Insecurity    Within the past 12 months, you worried that your food would run out before you got the money to buy  more. Never true    Within the past 12 months, the food you bought just didn't last and you didn't have money to get more. Never true       Transition Planning    Patient/Family Anticipates Transition to home;home with help/services    Patient/Family Anticipated Services at Transition durable medical equipment;home health care    Transportation Anticipated family or friend will provide;car, drives self       Discharge Needs Assessment    Readmission Within the Last 30 Days no previous admission in last 30 days    Concerns to be Addressed basic needs;discharge planning    Anticipated Changes Related to Illness none    Equipment Needed After Discharge oxygen    Outpatient/Agency/Support Group Needs homecare agency                   Discharge Plan       Row Name 01/13/25 1058       Plan    Plan Pt admitted due to shortness of breath- pneumonia. SW spoke with pt to assess needs. Pt lives at home alone and reports independence in ADLs. Pt transports self to appointments, however reports has had difficulty due to the recent weather. Pt does states she has family nearby that can assist as needed. Pt is currently on 1.5L 02 and does not wear at baseline. Will follow for 6MWT. SW discussed potential need for rehab or HHC. Pt mentioned interest in HHC but would like to see how she is feeling closer to discharge. Discussed any financial concerns and she reports her Ozempic has become expensive, however feels she may no longer be taking this. No other needs/concerns noted at this time. Pharmacy/PCP confirmed.    Patient/Family in Agreement with Plan yes                  Continued Care and Services - Admitted Since 1/11/2025    No active coordination exists for this encounter.          Demographic Summary       Row Name 01/13/25 9122       General Information    Admission Type inpatient    Arrived From emergency department    Referral Source admission list    Reason for Consult discharge planning    Preferred Language English        Contact Information    Permission Granted to Share Info With family/designee                   Functional Status       Row Name 01/13/25 1055       Functional Status    Usual Activity Tolerance good    Current Activity Tolerance good       Functional Status, IADL    Medications independent    Meal Preparation independent    Housekeeping independent    Laundry independent    Shopping independent       Mental Status    General Appearance WDL WDL       Mental Status Summary    Recent Changes in Mental Status/Cognitive Functioning no changes                    RONIT Rodríguez

## 2025-01-14 ENCOUNTER — ANESTHESIA EVENT (OUTPATIENT)
Dept: GASTROENTEROLOGY | Facility: HOSPITAL | Age: 75
End: 2025-01-14
Payer: MEDICARE

## 2025-01-14 ENCOUNTER — ANESTHESIA (OUTPATIENT)
Dept: GASTROENTEROLOGY | Facility: HOSPITAL | Age: 75
End: 2025-01-14
Payer: MEDICARE

## 2025-01-14 LAB
ACB CMPLX DNA BAL NAA+NON-PRB-NCNCRNG: NOT DETECTED
ANION GAP SERPL CALCULATED.3IONS-SCNC: 9 MMOL/L (ref 5–15)
BACTERIA SPEC RESP CULT: NORMAL
BLACTX-M ISLT/SPM QL: ABNORMAL
BLAIMP ISLT/SPM QL: ABNORMAL
BLAKPC ISLT/SPM QL: ABNORMAL
BLAOXA-48-LIKE ISLT/SPM QL: ABNORMAL
BLAVIM ISLT/SPM QL: ABNORMAL
BUN SERPL-MCNC: 12 MG/DL (ref 8–23)
BUN/CREAT SERPL: 19.7 (ref 7–25)
C PNEUM DNA NPH QL NAA+NON-PROBE: NOT DETECTED
CALCIUM SPEC-SCNC: 8.9 MG/DL (ref 8.6–10.5)
CHLORIDE SERPL-SCNC: 102 MMOL/L (ref 98–107)
CO2 SERPL-SCNC: 28 MMOL/L (ref 22–29)
CREAT SERPL-MCNC: 0.61 MG/DL (ref 0.57–1)
DEPRECATED RDW RBC AUTO: 42.2 FL (ref 37–54)
E CLOAC COMP DNA BAL NAA+NON-PRB-NCNCRNG: NOT DETECTED
E COLI DNA BAL NAA+NON-PRB-NCNCRNG: NOT DETECTED
EGFRCR SERPLBLD CKD-EPI 2021: 93.4 ML/MIN/1.73
ERYTHROCYTE [DISTWIDTH] IN BLOOD BY AUTOMATED COUNT: 13 % (ref 12.3–15.4)
FLUAV SUBTYP SPEC NAA+PROBE: NOT DETECTED
FLUBV RNA ISLT QL NAA+PROBE: NOT DETECTED
GLUCOSE BLDC GLUCOMTR-MCNC: 202 MG/DL (ref 70–99)
GLUCOSE BLDC GLUCOMTR-MCNC: 377 MG/DL (ref 70–99)
GLUCOSE BLDC GLUCOMTR-MCNC: 78 MG/DL (ref 70–99)
GLUCOSE BLDC GLUCOMTR-MCNC: 88 MG/DL (ref 70–99)
GLUCOSE SERPL-MCNC: 71 MG/DL (ref 65–99)
GP B STREP DNA BAL NAA+NON-PRB-NCNCRNG: NOT DETECTED
GRAM STN SPEC: NORMAL
HADV DNA SPEC NAA+PROBE: NOT DETECTED
HAEM INFLU DNA BAL NAA+NON-PRB-NCNCRNG: NOT DETECTED
HCOV RNA LOWER RESP QL NAA+NON-PROBE: NOT DETECTED
HCT VFR BLD AUTO: 35.2 % (ref 34–46.6)
HGB BLD-MCNC: 11.1 G/DL (ref 12–15.9)
HMPV RNA NPH QL NAA+NON-PROBE: NOT DETECTED
HPIV RNA LOWER RESP QL NAA+NON-PROBE: NOT DETECTED
K AEROGENES DNA BAL NAA+NON-PRB-NCNCRNG: NOT DETECTED
K OXYTOCA DNA BAL NAA+NON-PRB-NCNCRNG: NOT DETECTED
K PNEU GRP DNA BAL NAA+NON-PRB-NCNCRNG: NOT DETECTED
L PNEUMO DNA LOWER RESP QL NAA+NON-PROBE: NOT DETECTED
LYMPHOCYTES NFR FLD MANUAL: 7 %
M CATARRHALIS DNA BAL NAA+NON-PRB-NCNCRNG: NOT DETECTED
M PNEUMO IGG SER IA-ACNC: NOT DETECTED
MACROPHAGE FLUID %: 4 %
MAGNESIUM SERPL-MCNC: 1.9 MG/DL (ref 1.6–2.4)
MCH RBC QN AUTO: 27.6 PG (ref 26.6–33)
MCHC RBC AUTO-ENTMCNC: 31.5 G/DL (ref 31.5–35.7)
MCV RBC AUTO: 87.6 FL (ref 79–97)
MECA+MECC ISLT/SPM QL: ABNORMAL
NDM GENE: ABNORMAL
NEUTROPHILS NFR FLD MANUAL: 89 %
P AERUGINOSA DNA BAL NAA+NON-PRB-NCNCRNG: NOT DETECTED
PLATELET # BLD AUTO: 381 10*3/MM3 (ref 140–450)
PMV BLD AUTO: 10.1 FL (ref 6–12)
POTASSIUM SERPL-SCNC: 4 MMOL/L (ref 3.5–5.2)
PROTEUS SP DNA BAL NAA+NON-PRB-NCNCRNG: NOT DETECTED
RBC # BLD AUTO: 4.02 10*6/MM3 (ref 3.77–5.28)
RHINOVIRUS RNA SPEC NAA+PROBE: DETECTED
RSV RNA NPH QL NAA+NON-PROBE: NOT DETECTED
S AUREUS DNA BAL NAA+NON-PRB-NCNCRNG: NOT DETECTED
S MARCESCENS DNA BAL NAA+NON-PRB-NCNCRNG: NOT DETECTED
S PNEUM DNA BAL NAA+NON-PRB-NCNCRNG: NOT DETECTED
S PYO DNA BAL NAA+NON-PRB-NCNCRNG: NOT DETECTED
SODIUM SERPL-SCNC: 139 MMOL/L (ref 136–145)
VISUAL PRESENCE OF BLOOD: PRESENT
WBC NRBC COR # BLD AUTO: 14.58 10*3/MM3 (ref 3.4–10.8)

## 2025-01-14 PROCEDURE — 31624 DX BRONCHOSCOPE/LAVAGE: CPT | Performed by: STUDENT IN AN ORGANIZED HEALTH CARE EDUCATION/TRAINING PROGRAM

## 2025-01-14 PROCEDURE — 25010000002 CEFTRIAXONE PER 250 MG: Performed by: INTERNAL MEDICINE

## 2025-01-14 PROCEDURE — 88173 CYTOPATH EVAL FNA REPORT: CPT | Performed by: STUDENT IN AN ORGANIZED HEALTH CARE EDUCATION/TRAINING PROGRAM

## 2025-01-14 PROCEDURE — 83735 ASSAY OF MAGNESIUM: CPT | Performed by: INTERNAL MEDICINE

## 2025-01-14 PROCEDURE — 87206 SMEAR FLUORESCENT/ACID STAI: CPT | Performed by: STUDENT IN AN ORGANIZED HEALTH CARE EDUCATION/TRAINING PROGRAM

## 2025-01-14 PROCEDURE — 85027 COMPLETE CBC AUTOMATED: CPT | Performed by: INTERNAL MEDICINE

## 2025-01-14 PROCEDURE — 82948 REAGENT STRIP/BLOOD GLUCOSE: CPT

## 2025-01-14 PROCEDURE — 99232 SBSQ HOSP IP/OBS MODERATE 35: CPT | Performed by: STUDENT IN AN ORGANIZED HEALTH CARE EDUCATION/TRAINING PROGRAM

## 2025-01-14 PROCEDURE — 31653 BRONCH EBUS SAMPLNG 3/> NODE: CPT | Performed by: STUDENT IN AN ORGANIZED HEALTH CARE EDUCATION/TRAINING PROGRAM

## 2025-01-14 PROCEDURE — 87070 CULTURE OTHR SPECIMN AEROBIC: CPT | Performed by: STUDENT IN AN ORGANIZED HEALTH CARE EDUCATION/TRAINING PROGRAM

## 2025-01-14 PROCEDURE — 87071 CULTURE AEROBIC QUANT OTHER: CPT | Performed by: STUDENT IN AN ORGANIZED HEALTH CARE EDUCATION/TRAINING PROGRAM

## 2025-01-14 PROCEDURE — 89051 BODY FLUID CELL COUNT: CPT | Performed by: STUDENT IN AN ORGANIZED HEALTH CARE EDUCATION/TRAINING PROGRAM

## 2025-01-14 PROCEDURE — 63710000001 PREDNISONE PER 1 MG: Performed by: INTERNAL MEDICINE

## 2025-01-14 PROCEDURE — 63710000001 INSULIN LISPRO (HUMAN) PER 5 UNITS: Performed by: STUDENT IN AN ORGANIZED HEALTH CARE EDUCATION/TRAINING PROGRAM

## 2025-01-14 PROCEDURE — 31645 BRNCHSC W/THER ASPIR 1ST: CPT | Performed by: STUDENT IN AN ORGANIZED HEALTH CARE EDUCATION/TRAINING PROGRAM

## 2025-01-14 PROCEDURE — 88312 SPECIAL STAINS GROUP 1: CPT | Performed by: STUDENT IN AN ORGANIZED HEALTH CARE EDUCATION/TRAINING PROGRAM

## 2025-01-14 PROCEDURE — 88108 CYTOPATH CONCENTRATE TECH: CPT | Performed by: STUDENT IN AN ORGANIZED HEALTH CARE EDUCATION/TRAINING PROGRAM

## 2025-01-14 PROCEDURE — 82948 REAGENT STRIP/BLOOD GLUCOSE: CPT | Performed by: INTERNAL MEDICINE

## 2025-01-14 PROCEDURE — 25810000003 LACTATED RINGERS PER 1000 ML: Performed by: NURSE ANESTHETIST, CERTIFIED REGISTERED

## 2025-01-14 PROCEDURE — 87205 SMEAR GRAM STAIN: CPT | Performed by: STUDENT IN AN ORGANIZED HEALTH CARE EDUCATION/TRAINING PROGRAM

## 2025-01-14 PROCEDURE — 87252 VIRUS INOCULATION TISSUE: CPT | Performed by: STUDENT IN AN ORGANIZED HEALTH CARE EDUCATION/TRAINING PROGRAM

## 2025-01-14 PROCEDURE — 94664 DEMO&/EVAL PT USE INHALER: CPT

## 2025-01-14 PROCEDURE — 87102 FUNGUS ISOLATION CULTURE: CPT | Performed by: STUDENT IN AN ORGANIZED HEALTH CARE EDUCATION/TRAINING PROGRAM

## 2025-01-14 PROCEDURE — 87116 MYCOBACTERIA CULTURE: CPT | Performed by: STUDENT IN AN ORGANIZED HEALTH CARE EDUCATION/TRAINING PROGRAM

## 2025-01-14 PROCEDURE — 94799 UNLISTED PULMONARY SVC/PX: CPT

## 2025-01-14 PROCEDURE — 80048 BASIC METABOLIC PNL TOTAL CA: CPT | Performed by: INTERNAL MEDICINE

## 2025-01-14 PROCEDURE — C1726 CATH, BAL DIL, NON-VASCULAR: HCPCS | Performed by: STUDENT IN AN ORGANIZED HEALTH CARE EDUCATION/TRAINING PROGRAM

## 2025-01-14 PROCEDURE — 0B9C8ZX DRAINAGE OF RIGHT UPPER LUNG LOBE, VIA NATURAL OR ARTIFICIAL OPENING ENDOSCOPIC, DIAGNOSTIC: ICD-10-PCS | Performed by: STUDENT IN AN ORGANIZED HEALTH CARE EDUCATION/TRAINING PROGRAM

## 2025-01-14 PROCEDURE — 25010000002 LIDOCAINE PF 2% 2 % SOLUTION: Performed by: NURSE ANESTHETIST, CERTIFIED REGISTERED

## 2025-01-14 PROCEDURE — 87633 RESP VIRUS 12-25 TARGETS: CPT | Performed by: STUDENT IN AN ORGANIZED HEALTH CARE EDUCATION/TRAINING PROGRAM

## 2025-01-14 PROCEDURE — 07978ZX DRAINAGE OF THORAX LYMPHATIC, VIA NATURAL OR ARTIFICIAL OPENING ENDOSCOPIC APPROACH, DIAGNOSTIC: ICD-10-PCS | Performed by: STUDENT IN AN ORGANIZED HEALTH CARE EDUCATION/TRAINING PROGRAM

## 2025-01-14 PROCEDURE — 63710000001 INSULIN GLARGINE PER 5 UNITS: Performed by: INTERNAL MEDICINE

## 2025-01-14 PROCEDURE — 99233 SBSQ HOSP IP/OBS HIGH 50: CPT | Performed by: INTERNAL MEDICINE

## 2025-01-14 PROCEDURE — 88305 TISSUE EXAM BY PATHOLOGIST: CPT | Performed by: STUDENT IN AN ORGANIZED HEALTH CARE EDUCATION/TRAINING PROGRAM

## 2025-01-14 PROCEDURE — 25010000002 PROPOFOL 500 MG/50ML EMULSION: Performed by: NURSE ANESTHETIST, CERTIFIED REGISTERED

## 2025-01-14 RX ORDER — IPRATROPIUM BROMIDE AND ALBUTEROL SULFATE 2.5; .5 MG/3ML; MG/3ML
3 SOLUTION RESPIRATORY (INHALATION) ONCE
Status: COMPLETED | OUTPATIENT
Start: 2025-01-14 | End: 2025-01-14

## 2025-01-14 RX ORDER — SODIUM CHLORIDE, SODIUM LACTATE, POTASSIUM CHLORIDE, CALCIUM CHLORIDE 600; 310; 30; 20 MG/100ML; MG/100ML; MG/100ML; MG/100ML
30 INJECTION, SOLUTION INTRAVENOUS CONTINUOUS
Status: DISCONTINUED | OUTPATIENT
Start: 2025-01-14 | End: 2025-01-15

## 2025-01-14 RX ORDER — PHENYLEPHRINE HCL IN 0.9% NACL 1 MG/10 ML
SYRINGE (ML) INTRAVENOUS AS NEEDED
Status: DISCONTINUED | OUTPATIENT
Start: 2025-01-14 | End: 2025-01-14 | Stop reason: SURG

## 2025-01-14 RX ORDER — HYDROXYZINE HYDROCHLORIDE 25 MG/1
25 TABLET, FILM COATED ORAL 3 TIMES DAILY PRN
Status: DISCONTINUED | OUTPATIENT
Start: 2025-01-14 | End: 2025-01-15 | Stop reason: HOSPADM

## 2025-01-14 RX ORDER — ACETAMINOPHEN 325 MG/1
650 TABLET ORAL EVERY 6 HOURS PRN
Status: DISCONTINUED | OUTPATIENT
Start: 2025-01-14 | End: 2025-01-15 | Stop reason: HOSPADM

## 2025-01-14 RX ORDER — POLYETHYLENE GLYCOL 3350 17 G/17G
17 POWDER, FOR SOLUTION ORAL 2 TIMES DAILY PRN
Status: DISCONTINUED | OUTPATIENT
Start: 2025-01-14 | End: 2025-01-15 | Stop reason: HOSPADM

## 2025-01-14 RX ORDER — NICOTINE 21 MG/24HR
1 PATCH, TRANSDERMAL 24 HOURS TRANSDERMAL DAILY PRN
Status: DISCONTINUED | OUTPATIENT
Start: 2025-01-14 | End: 2025-01-15 | Stop reason: HOSPADM

## 2025-01-14 RX ORDER — LETROZOLE 2.5 MG/1
2.5 TABLET, FILM COATED ORAL DAILY
Status: DISCONTINUED | OUTPATIENT
Start: 2025-01-14 | End: 2025-01-15 | Stop reason: HOSPADM

## 2025-01-14 RX ORDER — PROPOFOL 10 MG/ML
INJECTION, EMULSION INTRAVENOUS AS NEEDED
Status: DISCONTINUED | OUTPATIENT
Start: 2025-01-14 | End: 2025-01-14 | Stop reason: SURG

## 2025-01-14 RX ORDER — ONDANSETRON 2 MG/ML
4 INJECTION INTRAMUSCULAR; INTRAVENOUS EVERY 4 HOURS PRN
Status: DISCONTINUED | OUTPATIENT
Start: 2025-01-14 | End: 2025-01-15 | Stop reason: HOSPADM

## 2025-01-14 RX ORDER — ALUMINA, MAGNESIA, AND SIMETHICONE 2400; 2400; 240 MG/30ML; MG/30ML; MG/30ML
15 SUSPENSION ORAL EVERY 6 HOURS PRN
Status: DISCONTINUED | OUTPATIENT
Start: 2025-01-14 | End: 2025-01-15 | Stop reason: HOSPADM

## 2025-01-14 RX ORDER — LIDOCAINE 4 G/G
1 PATCH TOPICAL DAILY PRN
Status: DISCONTINUED | OUTPATIENT
Start: 2025-01-14 | End: 2025-01-15 | Stop reason: HOSPADM

## 2025-01-14 RX ORDER — LIDOCAINE HYDROCHLORIDE 20 MG/ML
INJECTION, SOLUTION EPIDURAL; INFILTRATION; INTRACAUDAL; PERINEURAL AS NEEDED
Status: DISCONTINUED | OUTPATIENT
Start: 2025-01-14 | End: 2025-01-14 | Stop reason: SURG

## 2025-01-14 RX ADMIN — METOPROLOL TARTRATE 25 MG: 25 TABLET, FILM COATED ORAL at 08:26

## 2025-01-14 RX ADMIN — PENTOXIFYLLINE 400 MG: 400 TABLET, EXTENDED RELEASE ORAL at 20:27

## 2025-01-14 RX ADMIN — IPRATROPIUM BROMIDE AND ALBUTEROL SULFATE 3 ML: 2.5; .5 SOLUTION RESPIRATORY (INHALATION) at 04:15

## 2025-01-14 RX ADMIN — PREDNISONE 40 MG: 20 TABLET ORAL at 08:27

## 2025-01-14 RX ADMIN — ARFORMOTEROL TARTRATE 15 MCG: 15 SOLUTION RESPIRATORY (INHALATION) at 06:30

## 2025-01-14 RX ADMIN — PROPOFOL 150 MG: 10 INJECTION, EMULSION INTRAVENOUS at 13:38

## 2025-01-14 RX ADMIN — INSULIN LISPRO 3 UNITS: 100 INJECTION, SOLUTION INTRAVENOUS; SUBCUTANEOUS at 16:56

## 2025-01-14 RX ADMIN — Medication 10 ML: at 08:27

## 2025-01-14 RX ADMIN — PANTOPRAZOLE SODIUM 40 MG: 40 TABLET, DELAYED RELEASE ORAL at 05:27

## 2025-01-14 RX ADMIN — ASPIRIN 81 MG: 81 TABLET, COATED ORAL at 08:26

## 2025-01-14 RX ADMIN — IPRATROPIUM BROMIDE AND ALBUTEROL SULFATE 3 ML: 2.5; .5 SOLUTION RESPIRATORY (INHALATION) at 06:30

## 2025-01-14 RX ADMIN — BUDESONIDE 0.5 MG: 0.5 INHALANT RESPIRATORY (INHALATION) at 06:30

## 2025-01-14 RX ADMIN — GABAPENTIN 100 MG: 100 CAPSULE ORAL at 08:27

## 2025-01-14 RX ADMIN — INSULIN GLARGINE 10 UNITS: 100 INJECTION, SOLUTION SUBCUTANEOUS at 08:25

## 2025-01-14 RX ADMIN — SODIUM CHLORIDE, POTASSIUM CHLORIDE, SODIUM LACTATE AND CALCIUM CHLORIDE: 600; 310; 30; 20 INJECTION, SOLUTION INTRAVENOUS at 13:34

## 2025-01-14 RX ADMIN — NYSTATIN 500000 UNITS: 100000 SUSPENSION ORAL at 17:00

## 2025-01-14 RX ADMIN — LIDOCAINE HYDROCHLORIDE 60 MG: 20 INJECTION, SOLUTION EPIDURAL; INFILTRATION; INTRACAUDAL; PERINEURAL at 13:38

## 2025-01-14 RX ADMIN — MONTELUKAST 10 MG: 10 TABLET, FILM COATED ORAL at 20:27

## 2025-01-14 RX ADMIN — IPRATROPIUM BROMIDE AND ALBUTEROL SULFATE 3 ML: 2.5; .5 SOLUTION RESPIRATORY (INHALATION) at 15:27

## 2025-01-14 RX ADMIN — LETROZOLE 2.5 MG: 2.5 TABLET ORAL at 17:00

## 2025-01-14 RX ADMIN — NYSTATIN 500000 UNITS: 100000 SUSPENSION ORAL at 08:26

## 2025-01-14 RX ADMIN — NYSTATIN 500000 UNITS: 100000 SUSPENSION ORAL at 20:27

## 2025-01-14 RX ADMIN — ATORVASTATIN CALCIUM 10 MG: 10 TABLET, FILM COATED ORAL at 20:27

## 2025-01-14 RX ADMIN — SODIUM CHLORIDE 1000 MG: 9 INJECTION INTRAMUSCULAR; INTRAVENOUS; SUBCUTANEOUS at 08:25

## 2025-01-14 RX ADMIN — GABAPENTIN 100 MG: 100 CAPSULE ORAL at 20:27

## 2025-01-14 RX ADMIN — Medication 100 MCG: at 14:22

## 2025-01-14 RX ADMIN — GABAPENTIN 100 MG: 100 CAPSULE ORAL at 16:57

## 2025-01-14 RX ADMIN — IPRATROPIUM BROMIDE AND ALBUTEROL SULFATE 3 ML: 2.5; .5 SOLUTION RESPIRATORY (INHALATION) at 13:09

## 2025-01-14 RX ADMIN — INSULIN LISPRO 5 UNITS: 100 INJECTION, SOLUTION INTRAVENOUS; SUBCUTANEOUS at 20:27

## 2025-01-14 RX ADMIN — POLYETHYLENE GLYCOL 3350 17 G: 17 POWDER, FOR SOLUTION ORAL at 20:27

## 2025-01-14 RX ADMIN — PENTOXIFYLLINE 400 MG: 400 TABLET, EXTENDED RELEASE ORAL at 08:27

## 2025-01-14 RX ADMIN — PROPOFOL 250 MCG/KG/MIN: 10 INJECTION, EMULSION INTRAVENOUS at 13:39

## 2025-01-14 RX ADMIN — Medication 400 MG: at 17:00

## 2025-01-14 RX ADMIN — IPRATROPIUM BROMIDE AND ALBUTEROL SULFATE 3 ML: 2.5; .5 SOLUTION RESPIRATORY (INHALATION) at 01:19

## 2025-01-14 NOTE — PROGRESS NOTES
Pulmonary / Critical Care Progress Note      Patient Name: Gini Edmondson  : 1950  MRN: [id number]  Attending:  Colt Cowart MD  Date of admission: 2025    Subjective   Subjective   Follow-up for multifocal pneumonia, abnormal chest CT    Over past 24 hours:  Remains on 2 L nasal cannula  Still having some cough that is mostly nonproductive  Patient is niece at bedside  600 cc urine output over last 24 hours  We discussed her procedure today and questions were answered    Review of Systems  General: Denied complaints  Cardiovascular:  Denied complaints  Respiratory: Denied complaints  Gastrointestinal: Denied complaints        Objective   Objective     Vitals:   Temp:  [97 °F (36.1 °C)-98.4 °F (36.9 °C)] 98.1 °F (36.7 °C)  Heart Rate:  [85-97] 97  Resp:  [14-18] 18  BP: (117-145)/(50-65) 117/59  Flow (L/min) (Oxygen Therapy):  [1.5-2] 2    Physical Exam   Vital Signs Reviewed   General:  WDWN, Alert, NAD.    HEENT:  PERRL, EOMI.  OP, nares clear  Chest:  good aeration, clear to auscultation bilaterally, no work of breathing noted on 2L NC  CV: RRR, no MGR, pulses 2+, equal.  Abd:  Soft, NT, ND, + BS  EXT:  no clubbing, no cyanosis, no edema  Neuro:  A&Ox3, CN grossly intact, no focal deficits.  Skin: No rashes or lesions noted      Result Review    Result Review:  I have personally reviewed the results from the time of this admission to 2025 12:22 EST and agree with these findings:  []  Laboratory  []  Microbiology  []  Radiology  []  EKG/Telemetry   []  Cardiology/Vascular   []  Pathology  []  Old records  []  Other:  Most notable findings include:   -     Assessment & Plan   Assessment / Plan     Active Hospital Problems:  Active Hospital Problems    Diagnosis     **Pneumonia          Impression:  Acute hypoxic respiratory failure  Right upper lobe pneumonia  Abnormal chest CT  COPD exacerbation  History of breast cancer on hormonal therapy  History of diabetes  History of CAD status post  CABG     Plan:  Continue supplemental oxygen to keep SpO2 greater than 90%.  Respiratory viral panel. Negative.  Flu, COVID, RSV negative.  Chest CT personally reviewed.  There is dense right upper lobe consolidation which may reflect pneumonia with reactive lymphadenopathy.  However given her history of breast cancer on hormone blocker, neoplastic process is certainly in the differential.  Because of this I will take her for EBUS bronchoscopy today.  We discussed risk and benefits of procedure.  Risk include bleeding, infection, pneumothorax, and even death can occur.  Patient expressed understanding and would like to proceed.  Keep n.p.o. for procedure  Hold anticoagulation for procedure  Continue antibiotics for now for CAP coverage  Continue prednisone 40 mg daily.  Continue nebs and bronchopulmonary hygiene    VTE Prophylaxis:  Pharmacologic VTE prophylaxis orders are present.        CODE STATUS:   Level Of Support Discussed With: Patient  Code Status (Patient has no pulse and is not breathing): CPR (Attempt to Resuscitate)  Medical Interventions (Patient has pulse or is breathing): Full Support      Labs, images, and medications personally reviewed.    Discussed with patient    Electronically signed by Simi Birmingham MD, 01/14/25, 12:22 PM EST.

## 2025-01-14 NOTE — PLAN OF CARE
Goal Outcome Evaluation:  Plan of Care Reviewed With: patient        Progress: improving  Outcome Evaluation: Patient had no complaints of pain or discomfort, vitals stable, had procedure today, tolerated well.

## 2025-01-14 NOTE — OP NOTE
Bronchoscopy Procedure Note    Procedure:    Endobronchial ultrasound guided lymph node aspiration  Transbronchial biopsy  Bronchoalveolar lavage  Bronchial washings of tracheobronchial tree  Airway inspection  Airway clearance      Pre-Operative Diagnosis: Lymphadenopathy, pneumonia  Post-Operative Diagnosis: Same    Indication: See above.     Anesthesia: MAC anesthesia.    Procedure Details: Patient was consented for the procedure with all risks and benefits of the procedure explained in detail. Patient was given the opportunity to ask questions and all concerns were answered. The Endobronchial ultrasound bronchoscope (EBUS) was inserted into the main airway via the mouth. An anatomical and mediastinal survey was done of the main airways and the subsegmental bronchus with EBUS scope. Enlarged mediastinal and hilar lymph nodes were seen. Transbronchial aspiration biopsy of lymph nodes were done starting at station 11R with 5 passes, then station 10R with 6 passes, and station 4R with 6 passes. Then scope was changed to regular bronchoscope, airway inspection was done. A bronchoalveolar lavage was performed using 60 mL aliquot of normal saline with 30 mL serosanguineous fluid in return from RUL. Airway clearance of lung performed with suctioning and removal of secretions and mucous plugs.  Post procedure no bleeding was seen.    Estimated Blood Loss: <10 mL    Findings:  -Mucous plugging in right upper lobe    Specimens:  - Cytology specimen from lymph node stations: 11R, 10R, 4R  - 30 mL bronchoalveolar lavage RUL lobe  - Bronchial washings    Complications:   Patient tolerated the procedure well. No complications during or after the procedure.    Disposition: To home. Follow up with pulmonary clinic within a week.    Electronically signed by Simi Birmingham MD, 1/14/2025, 14:35 EST.

## 2025-01-14 NOTE — PLAN OF CARE
Goal Outcome Evaluation:           Progress: improving  Outcome Evaluation: patient alert and oriented and remains on 1.5 liters oxygen, rested in bed thoughout the day with no complaints of pain or discomfort. Patient showered today. Medicated per MAR. Home medications added and PRN cough meds obtained and given per MAR. Seen by pulmonology. Plans to do bronch tomorrow and will be NPO after midnight. Continue plan of care, no concerns.

## 2025-01-14 NOTE — CONSULTS
"Nutrition Services    Patient Name: Gini Edmondson  YOB: 1950  MRN: [id number]  Admission date: 1/11/2025      CLINICAL NUTRITION ASSESSMENT      Reason for Assessment  MST Score 2+     H&P:  Past Medical History:   Diagnosis Date    Allergy to sunlight     PER PT    Asthma     Cancer of left breast     INVASVIE LOBULAR CARCINOMA    Cardiac disorder     DR. MALCOLM ANTHONY    Condition not found     RIGHT EYE CATARACT (LT CATARACT REPAIRED)    Constipation     COPD (chronic obstructive pulmonary disease)     Diabetes mellitus     Elevated cholesterol     Heart attack     3-22-18 NON STEMI, CABG 3-23-18    Hx of bronchitis     SMOKES 5-7 CIGERETTS PER DAY    Hx of CABG     3V CABG 2018    Hypertension     MEDS CONTROL    Neuropathy     IN FEETH FROM DM    PAD (peripheral artery disease)     Raynaud's syndrome     Status post left breast lumpectomy         Current Problems:   Active Hospital Problems    Diagnosis     **Pneumonia         Nutrition/Diet History         Narrative   Pt  reports decreased po intake and wt loss. However, this is due to use of Ozempic medication (intentional wt loss). Currently NPO. Advance diet as feasible.      Anthropometrics        Current Height, Weight Height: 157.5 cm (62\")  Weight: 67.5 kg (148 lb 13 oz)   Current BMI Body mass index is 27.22 kg/m².   BMI Classification Overweight   % % (50.1 kg)    Adjusted Body Weight (ABW)    Weight Hx  Wt Readings from Last 30 Encounters:   01/13/25 0600 67.5 kg (148 lb 13 oz)   01/12/25 0559 63.5 kg (139 lb 15.9 oz)   01/11/25 2252 61.7 kg (136 lb 0.4 oz)   01/11/25 1737 64.1 kg (141 lb 5 oz)   11/14/24 1054 66 kg (145 lb 6.4 oz)   08/07/24 1454 72.6 kg (160 lb)   07/09/24 1301 74.8 kg (164 lb 14.5 oz)   05/02/24 0743 79.9 kg (176 lb 2.4 oz)   03/13/24 1436 80.3 kg (177 lb)   12/27/23 1518 83.6 kg (184 lb 4.9 oz)   05/25/23 1525 80.1 kg (176 lb 9.4 oz)   11/10/22 1105 82.6 kg (182 lb 1.6 oz)   09/15/22 0815 82.6 kg (182 lb) "   08/24/22 1425 82.6 kg (182 lb)   04/18/22 1141 82.4 kg (181 lb 10.5 oz)   12/02/21 1616 82.4 kg (181 lb 10.5 oz)   09/14/21 1315 81.4 kg (179 lb 7.3 oz)   07/07/21 1421 81.1 kg (178 lb 12.7 oz)   06/24/21 1348 81.6 kg (180 lb)   12/14/20 1349 83.2 kg (183 lb 6.8 oz)   08/03/20 1018 83.6 kg (184 lb 4.9 oz)   05/22/20 1308 84.8 kg (186 lb 15.2 oz)   04/22/20 1316 84.9 kg (187 lb 2.7 oz)   04/16/20 0000 84.4 kg (186 lb)   04/06/20 0000 84.6 kg (186 lb 8 oz)   02/13/20 0000 86 kg (189 lb 8 oz)          Wt Change Observation -16 lbs x 6 months (9.7%)      Estimated/Assessed Needs  Estimated Needs based on: Current Body Weight       Energy Requirements 25-30 kcal/kg   EST Needs (kcal/day) 4588-1926 kcal       Protein Requirements 1.2-1.5 g.kg   EST Daily Needs (g/day) 60-75 g       Fluid Requirements 25-30 ml/kg    Estimated Needs (mL/day) 8441-6260 ml      Labs/Medications         Pertinent Labs Reviewed.   Results from last 7 days   Lab Units 01/14/25  0536 01/13/25  0604 01/12/25  0320 01/11/25  1830   SODIUM mmol/L 139 133* 130* 132*   POTASSIUM mmol/L 4.0 3.8 4.5 3.9   CHLORIDE mmol/L 102 97* 95* 93*   CO2 mmol/L 28.0 26.5 22.3 26.0   BUN mg/dL 12 18 13 15   CREATININE mg/dL 0.61 0.63 0.61 0.75   CALCIUM mg/dL 8.9 8.8 8.8 9.7   BILIRUBIN mg/dL  --   --  0.4 0.5   ALK PHOS U/L  --   --  92 102   ALT (SGPT) U/L  --   --  10 9   AST (SGOT) U/L  --   --  21 20   GLUCOSE mg/dL 71 282* 319* 256*     Results from last 7 days   Lab Units 01/14/25  0536 01/13/25  0604 01/12/25  0320   MAGNESIUM mg/dL 1.9 2.0 2.0   HEMOGLOBIN g/dL 11.1* 11.3* 12.3   HEMATOCRIT % 35.2 34.6 38.1     COVID19   Date Value Ref Range Status   01/13/2025 Not Detected Not Detected - Ref. Range Final     Lab Results   Component Value Date    HGBA1C 9.3 (H) 09/26/2024         Pertinent Medications Reviewed.     Malnutrition Severity Assessment              Nutrition Diagnosis         Nutrition Dx Problem 1 Inadequate oral Intake related to decreased  ability to consume sufficient energy as evidenced by decreased appetite.     Nutrition Intervention           Current Nutrition Orders & Evaluation of Intake       Current PO Diet NPO Diet NPO Type: Sips with Meds   Supplement No active supplement orders           Nutrition Intervention/Prescription        Advance diet as feasible        Medical Nutrition Therapy/Nutrition Education          Learner     Readiness Patient  Acceptance     Method     Response Explanation  Verbalizes understanding     Monitor/Evaluation        Monitor Per protocol, I&O, PO intake, GI status     Nutrition Discharge Plan         To be determined     Electronically signed by:  Raul Hyatt RD  01/14/25 14:55 EST

## 2025-01-14 NOTE — PLAN OF CARE
Goal Outcome Evaluation:  Plan of Care Reviewed With: patient        Progress: improving  Outcome Evaluation: pt aox4, vs stable on 1.5 L nc. wheezes noted in right upper lobe. occasional productive cough with yellow/brown sputum. denies pain. pt complaining of difficulty sleeping, recieved order for melatonin from Halie FRANCO. NPO at midnight for bronch.

## 2025-01-14 NOTE — ANESTHESIA POSTPROCEDURE EVALUATION
Patient: Gini Edmondson    Procedure Summary       Date: 01/14/25 Room / Location: [facility] ENDOSCOPY 3 / [facility] END[facility]COPY    Anesthesia Start: 1334 Anesthesia Stop: 1439    Procedure: BRONCHOSCOPY WITH ENDOBRONCHIAL ULTRASOUND WITH FINE NEEDLE ASPIRATIONS, BRONCHEOALVEOLAR LAVAGE, AND WASHINGS (Bronchus) Diagnosis:       Pneumonia of right upper lobe due to infectious organism      (Pneumonia of right upper lobe due to infectious organism [J18.9])    Surgeons: Simi Birmingham MD Provider: Deb Yu CRNA    Anesthesia Type: general ASA Status: 3            Anesthesia Type: general    Vitals  Vitals Value Taken Time   /57 01/14/25 1450   Temp 36.3 °C (97.3 °F) 01/14/25 1439   Pulse 89 01/14/25 1452   Resp 24 01/14/25 1447   SpO2 94 % 01/14/25 1452   Vitals shown include unfiled device data.        Post Anesthesia Care and Evaluation    Patient location during evaluation: bedside  Patient participation: complete - patient participated  Post-procedure mental status: acceptable.  Pain management: satisfactory to patient    Airway patency: patent  Anesthetic complications: No anesthetic complications  PONV Status: controlled  Cardiovascular status: acceptable  Respiratory status: acceptable    Comments: Per chart review

## 2025-01-14 NOTE — ANESTHESIA PREPROCEDURE EVALUATION
Anesthesia Evaluation     Patient summary reviewed and Nursing notes reviewed   NPO Solid Status: > 8 hours  NPO Liquid Status: > 2 hours           Airway   Mallampati: II  TM distance: >3 FB  Neck ROM: full  No difficulty expected  Dental          Pulmonary - normal exam    breath sounds clear to auscultation  (+) a smoker Current, Abstained day of surgery, COPD mild, asthma,  Cardiovascular - normal exam  Exercise tolerance: good (4-7 METS)    ECG reviewed  Rhythm: regular  Rate: normal    (+) hypertension well controlled, past MI  >12 months, CABG >6 Months, PVD      Neuro/Psych  (+) psychiatric history Depression  GI/Hepatic/Renal/Endo    (+) obesity, diabetes mellitus type 2 well controlled, thyroid problem     Musculoskeletal     Abdominal  - normal exam   Substance History      OB/GYN          Other      history of cancer    ROS/Med Hx Other: Last dose Ozempic in 12/2024.    EKG 1/11/2025:   bpm  Sinus tachycardia  Probable left atrial enlargement  Abnormal R-wave progression, early transition    CT Chest 1/12/2025:  Impression:  Consolidation in the posterior right upper lung measuring 6.3 x 6.2 cm which may reflect pneumonia with reactive right hilar lymphadenopathy however right hilar neoplastic process cannot be excluded and treatment and follow-up imaging to ensure  resolution is recommended. Follow-up chest CT should be obtained with intravenous contrast for better evaluation of right hilar lesions.     Bronchial wall thickening in the right upper and right lower lung with septal thickening associated with inflammatory changes.    Remains on 2 L nasal cannula  Still having some cough that is mostly nonproductive. Duoneb ordered in preop.                      Anesthesia Plan    ASA 3     general   total IV anesthesia  (Patient understands anesthesia not responsible for dental damage.  )  intravenous induction     Anesthetic plan, risks, benefits, and alternatives have been provided, discussed and  informed consent has been obtained with: patient.  Pre-procedure education provided  Plan discussed with CRNA.        CODE STATUS:    Level Of Support Discussed With: Patient  Code Status (Patient has no pulse and is not breathing): CPR (Attempt to Resuscitate)  Medical Interventions (Patient has pulse or is breathing): Full Support

## 2025-01-15 ENCOUNTER — READMISSION MANAGEMENT (OUTPATIENT)
Dept: CALL CENTER | Facility: HOSPITAL | Age: 75
End: 2025-01-15
Payer: MEDICARE

## 2025-01-15 VITALS
RESPIRATION RATE: 18 BRPM | HEART RATE: 101 BPM | OXYGEN SATURATION: 92 % | DIASTOLIC BLOOD PRESSURE: 60 MMHG | BODY MASS INDEX: 27.26 KG/M2 | HEIGHT: 62 IN | SYSTOLIC BLOOD PRESSURE: 118 MMHG | WEIGHT: 148.15 LBS | TEMPERATURE: 97.2 F

## 2025-01-15 LAB
ANION GAP SERPL CALCULATED.3IONS-SCNC: 9.5 MMOL/L (ref 5–15)
BASOPHILS # BLD AUTO: 0.02 10*3/MM3 (ref 0–0.2)
BASOPHILS NFR BLD AUTO: 0.1 % (ref 0–1.5)
BUN SERPL-MCNC: 10 MG/DL (ref 8–23)
BUN/CREAT SERPL: 16.9 (ref 7–25)
CALCIUM SPEC-SCNC: 8.7 MG/DL (ref 8.6–10.5)
CHLORIDE SERPL-SCNC: 98 MMOL/L (ref 98–107)
CO2 SERPL-SCNC: 28.5 MMOL/L (ref 22–29)
CREAT SERPL-MCNC: 0.59 MG/DL (ref 0.57–1)
DEPRECATED RDW RBC AUTO: 43.1 FL (ref 37–54)
EGFRCR SERPLBLD CKD-EPI 2021: 94.1 ML/MIN/1.73
EOSINOPHIL # BLD AUTO: 0.06 10*3/MM3 (ref 0–0.4)
EOSINOPHIL NFR BLD AUTO: 0.4 % (ref 0.3–6.2)
ERYTHROCYTE [DISTWIDTH] IN BLOOD BY AUTOMATED COUNT: 13.2 % (ref 12.3–15.4)
GLUCOSE BLDC GLUCOMTR-MCNC: 147 MG/DL (ref 70–99)
GLUCOSE BLDC GLUCOMTR-MCNC: 391 MG/DL (ref 70–99)
GLUCOSE SERPL-MCNC: 136 MG/DL (ref 65–99)
HCT VFR BLD AUTO: 36.7 % (ref 34–46.6)
HGB BLD-MCNC: 11.5 G/DL (ref 12–15.9)
IMM GRANULOCYTES # BLD AUTO: 0.05 10*3/MM3 (ref 0–0.05)
IMM GRANULOCYTES NFR BLD AUTO: 0.3 % (ref 0–0.5)
LYMPHOCYTES # BLD AUTO: 3.48 10*3/MM3 (ref 0.7–3.1)
LYMPHOCYTES NFR BLD AUTO: 23 % (ref 19.6–45.3)
MAGNESIUM SERPL-MCNC: 1.7 MG/DL (ref 1.6–2.4)
MCH RBC QN AUTO: 27.5 PG (ref 26.6–33)
MCHC RBC AUTO-ENTMCNC: 31.3 G/DL (ref 31.5–35.7)
MCV RBC AUTO: 87.8 FL (ref 79–97)
MONOCYTES # BLD AUTO: 1.69 10*3/MM3 (ref 0.1–0.9)
MONOCYTES NFR BLD AUTO: 11.2 % (ref 5–12)
NEUTROPHILS NFR BLD AUTO: 65 % (ref 42.7–76)
NEUTROPHILS NFR BLD AUTO: 9.83 10*3/MM3 (ref 1.7–7)
NRBC BLD AUTO-RTO: 0 /100 WBC (ref 0–0.2)
PHOSPHATE SERPL-MCNC: 3.1 MG/DL (ref 2.5–4.5)
PLATELET # BLD AUTO: 398 10*3/MM3 (ref 140–450)
PMV BLD AUTO: 9.9 FL (ref 6–12)
POTASSIUM SERPL-SCNC: 4.1 MMOL/L (ref 3.5–5.2)
PROCALCITONIN SERPL-MCNC: 0.15 NG/ML (ref 0–0.25)
RBC # BLD AUTO: 4.18 10*6/MM3 (ref 3.77–5.28)
SODIUM SERPL-SCNC: 136 MMOL/L (ref 136–145)
WBC NRBC COR # BLD AUTO: 15.13 10*3/MM3 (ref 3.4–10.8)

## 2025-01-15 PROCEDURE — 63710000001 INSULIN LISPRO (HUMAN) PER 5 UNITS: Performed by: STUDENT IN AN ORGANIZED HEALTH CARE EDUCATION/TRAINING PROGRAM

## 2025-01-15 PROCEDURE — 94799 UNLISTED PULMONARY SVC/PX: CPT

## 2025-01-15 PROCEDURE — 99232 SBSQ HOSP IP/OBS MODERATE 35: CPT | Performed by: STUDENT IN AN ORGANIZED HEALTH CARE EDUCATION/TRAINING PROGRAM

## 2025-01-15 PROCEDURE — 84100 ASSAY OF PHOSPHORUS: CPT | Performed by: STUDENT IN AN ORGANIZED HEALTH CARE EDUCATION/TRAINING PROGRAM

## 2025-01-15 PROCEDURE — 97165 OT EVAL LOW COMPLEX 30 MIN: CPT

## 2025-01-15 PROCEDURE — 82948 REAGENT STRIP/BLOOD GLUCOSE: CPT | Performed by: STUDENT IN AN ORGANIZED HEALTH CARE EDUCATION/TRAINING PROGRAM

## 2025-01-15 PROCEDURE — 83735 ASSAY OF MAGNESIUM: CPT | Performed by: STUDENT IN AN ORGANIZED HEALTH CARE EDUCATION/TRAINING PROGRAM

## 2025-01-15 PROCEDURE — 99239 HOSP IP/OBS DSCHRG MGMT >30: CPT | Performed by: INTERNAL MEDICINE

## 2025-01-15 PROCEDURE — 84145 PROCALCITONIN (PCT): CPT | Performed by: STUDENT IN AN ORGANIZED HEALTH CARE EDUCATION/TRAINING PROGRAM

## 2025-01-15 PROCEDURE — 63710000001 PREDNISONE PER 1 MG: Performed by: STUDENT IN AN ORGANIZED HEALTH CARE EDUCATION/TRAINING PROGRAM

## 2025-01-15 PROCEDURE — 80048 BASIC METABOLIC PNL TOTAL CA: CPT | Performed by: STUDENT IN AN ORGANIZED HEALTH CARE EDUCATION/TRAINING PROGRAM

## 2025-01-15 PROCEDURE — 94664 DEMO&/EVAL PT USE INHALER: CPT

## 2025-01-15 PROCEDURE — 85025 COMPLETE CBC W/AUTO DIFF WBC: CPT | Performed by: STUDENT IN AN ORGANIZED HEALTH CARE EDUCATION/TRAINING PROGRAM

## 2025-01-15 PROCEDURE — 94618 PULMONARY STRESS TESTING: CPT

## 2025-01-15 PROCEDURE — 63710000001 INSULIN GLARGINE PER 5 UNITS: Performed by: STUDENT IN AN ORGANIZED HEALTH CARE EDUCATION/TRAINING PROGRAM

## 2025-01-15 RX ORDER — IPRATROPIUM BROMIDE AND ALBUTEROL SULFATE 2.5; .5 MG/3ML; MG/3ML
3 SOLUTION RESPIRATORY (INHALATION) EVERY 4 HOURS PRN
Status: DISCONTINUED | OUTPATIENT
Start: 2025-01-15 | End: 2025-01-15 | Stop reason: HOSPADM

## 2025-01-15 RX ORDER — NYSTATIN 100000 [USP'U]/ML
5 SUSPENSION ORAL 4 TIMES DAILY
Qty: 60 ML | Refills: 0 | Status: SHIPPED | OUTPATIENT
Start: 2025-01-15 | End: 2025-01-18

## 2025-01-15 RX ORDER — MAGNESIUM HYDROXIDE 1200 MG/15ML
LIQUID ORAL AS NEEDED
Status: DISCONTINUED | OUTPATIENT
Start: 2025-01-14 | End: 2025-01-15 | Stop reason: HOSPADM

## 2025-01-15 RX ORDER — PREDNISONE 20 MG/1
20 TABLET ORAL
Qty: 2 TABLET | Refills: 0 | Status: SHIPPED | OUTPATIENT
Start: 2025-01-16 | End: 2025-01-18

## 2025-01-15 RX ORDER — IPRATROPIUM BROMIDE AND ALBUTEROL SULFATE 2.5; .5 MG/3ML; MG/3ML
3 SOLUTION RESPIRATORY (INHALATION) EVERY 4 HOURS PRN
Qty: 84 ML | Refills: 0 | Status: SHIPPED | OUTPATIENT
Start: 2025-01-15

## 2025-01-15 RX ORDER — BENZONATATE 200 MG/1
200 CAPSULE ORAL 3 TIMES DAILY PRN
Qty: 30 CAPSULE | Refills: 0 | Status: SHIPPED | OUTPATIENT
Start: 2025-01-15

## 2025-01-15 RX ADMIN — ASPIRIN 81 MG: 81 TABLET, COATED ORAL at 08:03

## 2025-01-15 RX ADMIN — PANTOPRAZOLE SODIUM 40 MG: 40 TABLET, DELAYED RELEASE ORAL at 06:27

## 2025-01-15 RX ADMIN — GUAIFENESIN 200 MG: 100 LIQUID ORAL at 01:07

## 2025-01-15 RX ADMIN — INSULIN LISPRO 6 UNITS: 100 INJECTION, SOLUTION INTRAVENOUS; SUBCUTANEOUS at 12:32

## 2025-01-15 RX ADMIN — NYSTATIN 500000 UNITS: 100000 SUSPENSION ORAL at 12:32

## 2025-01-15 RX ADMIN — PREDNISONE 40 MG: 20 TABLET ORAL at 08:03

## 2025-01-15 RX ADMIN — ARFORMOTEROL TARTRATE 15 MCG: 15 SOLUTION RESPIRATORY (INHALATION) at 07:10

## 2025-01-15 RX ADMIN — PHENOL 1 SPRAY: 1.5 LIQUID ORAL at 01:03

## 2025-01-15 RX ADMIN — IPRATROPIUM BROMIDE AND ALBUTEROL SULFATE 3 ML: 2.5; .5 SOLUTION RESPIRATORY (INHALATION) at 07:10

## 2025-01-15 RX ADMIN — Medication 10 MG: at 01:07

## 2025-01-15 RX ADMIN — INSULIN GLARGINE 25 UNITS: 100 INJECTION, SOLUTION SUBCUTANEOUS at 08:03

## 2025-01-15 RX ADMIN — NYSTATIN 500000 UNITS: 100000 SUSPENSION ORAL at 08:03

## 2025-01-15 RX ADMIN — GABAPENTIN 100 MG: 100 CAPSULE ORAL at 08:03

## 2025-01-15 RX ADMIN — BUDESONIDE 0.5 MG: 0.5 INHALANT RESPIRATORY (INHALATION) at 07:10

## 2025-01-15 RX ADMIN — AMOXICILLIN AND CLAVULANATE POTASSIUM 1 TABLET: 875; 125 TABLET, FILM COATED ORAL at 09:02

## 2025-01-15 RX ADMIN — PENTOXIFYLLINE 400 MG: 400 TABLET, EXTENDED RELEASE ORAL at 08:03

## 2025-01-15 NOTE — PLAN OF CARE
Goal Outcome Evaluation:  Plan of Care Reviewed With: patient        Progress: improving  Outcome Evaluation: Patient had no complaints of pain or discomfort, vitals stable, up ad delvis without oxygen, being discharged home.

## 2025-01-15 NOTE — PROGRESS NOTES
Walking Oximetry Progress Note      Patient Name:  Gini Edmondson  YOB: 1950  Date of Procedure: 01/15/25              ROOM AIR BASELINE   SpO2% 95   Heart Rate 101     EXERCISE ON ROOM AIR SpO2% EXERCISE ON O2 LPM SpO2%   1 MINUTE 93 1 MINUTE     2 MINUTES 93 2 MINUTES     3 MINUTES 92 3 MINUTES     4 MINUTES 90 4 MINUTES     5 MINUTES 91 5 MINUTES     6 MINUTES 93 6 MINUTES                Time to Recovery  none   SpO2% Post Exercise  96 on room air.    HR Post Exercise  112     Comments: none          Electronically signed by Jessica Donato RRT, 01/15/25, 11:42 AM EST.

## 2025-01-15 NOTE — PROGRESS NOTES
Pulmonary / Critical Care Progress Note      Patient Name: Gini Edmondson  : 1950  MRN: [id number]  Attending:  Colt Cowart MD  Date of admission: 2025    Subjective   Subjective   Follow-up for multifocal pneumonia, abnormal chest CT    Over past 24 hours:  Currently on 1 L nasal cannula  Good urine output over last 24 hours  Reports feeling better  Pneumonia panel positive for rhinovirus from BAL    Review of Systems  General: Denied complaints  Cardiovascular:  Denied complaints  Respiratory: Denied complaints  Gastrointestinal: Denied complaints        Objective   Objective     Vitals:   Temp:  [97.2 °F (36.2 °C)-98.1 °F (36.7 °C)] 97.2 °F (36.2 °C)  Heart Rate:  [] 101  Resp:  [16-30] 18  BP: (104-147)/() 118/60  Flow (L/min) (Oxygen Therapy):  [1-7] 1    Physical Exam   Vital Signs Reviewed   General:  WDWN, Alert, NAD.    HEENT:  PERRL, EOMI.  OP, nares clear  Chest:  good aeration, clear to auscultation bilaterally, no work of breathing noted on1L NC  CV: RRR, no MGR, pulses 2+, equal.  Abd:  Soft, NT, ND, + BS  EXT:  no clubbing, no cyanosis, no edema  Neuro:  A&Ox3, CN grossly intact, no focal deficits.  Skin: No rashes or lesions noted      Result Review    Result Review:  I have personally reviewed the results from the time of this admission to 1/15/2025 13:43 EST and agree with these findings:  []  Laboratory  []  Microbiology  []  Radiology  []  EKG/Telemetry   []  Cardiology/Vascular   []  Pathology  []  Old records  []  Other:  Most notable findings include:   -     Assessment & Plan   Assessment / Plan     Active Hospital Problems:  Active Hospital Problems    Diagnosis     **Pneumonia          Impression:  Acute hypoxic respiratory failure  Right upper lobe pneumonia  Abnormal chest CT  COPD exacerbation  Human rhinovirus positive  History of breast cancer on hormonal therapy  History of diabetes  History of CAD status post CABG     Plan:  Continue supplemental  oxygen to keep SpO2 greater than 90%.  6-minute walk test prior to discharge  Respiratory viral panel. Negative.  Flu, COVID, RSV negative.  Status post EBUS bronchoscopy 1/14  Pneumonia panel positive for human rhinovirus from BAL  Lymph node stations 11R, 10R, 4R biopsy, pending pathology  Continue prednisone 40 mg daily x 5 days total  Continue Augmentin for total of 5 days  Continue nebs and bronchopulmonary hygiene  If biopsy negative.  Repeat chest CT in 4 to 6 weeks to ensure resolution of her pneumonia.    VTE Prophylaxis:  Pharmacologic VTE prophylaxis orders are present.        CODE STATUS:   Level Of Support Discussed With: Patient  Code Status (Patient has no pulse and is not breathing): CPR (Attempt to Resuscitate)  Medical Interventions (Patient has pulse or is breathing): Full Support      Labs, images, and medications personally reviewed.  Discussed with patient  Electronically signed by Simi Birmingham MD, 01/15/25, 1:53 PM EST.

## 2025-01-15 NOTE — PLAN OF CARE
Goal Outcome Evaluation:  Plan of Care Reviewed With: patient        Progress: no change  Outcome Evaluation: Patient not appropriate for skilled OT services at this time as patient has not had a decline in ADLs or ADL transfers/fx'l mobility. patient safe to d/c to previous setting when medically appropriate. d/c occupational therapy services.    Anticipated Discharge Disposition (OT): home

## 2025-01-15 NOTE — PROGRESS NOTES
[facility]   Hospitalist Progress Note    Date of admission: 1/11/2025  Patient Name: Gini Edmondson  1950  Date: 1/14/2025      Subjective     Chief Complaint   Patient presents with    Shortness of Breath    Cough    Wound Check       Interval Followup: Seen following bronch today had right upper lobe mucous plugging.  Feels like breathing doing little better but not quite sure yet as she discussed back.  Denies any overt fevers      Objective     Vitals:   Temp:  [97.3 °F (36.3 °C)-98.4 °F (36.9 °C)] 97.6 °F (36.4 °C)  Heart Rate:  [85-97] 95  Resp:  [16-30] 20  BP: (104-147)/() 137/56  Flow (L/min) (Oxygen Therapy):  [1.5-7] 2    Physical Exam  Awake conversant  Bilateral rhonchi, no acute wheezing currently, symmetric chest rise good aeration on nasal cannula   Elevated rate regular rhythm no lower extremity pitting edema  Abdomen soft nontender nondistended    Result Review:  Vital signs, labs and recent relevant imaging reviewed.      CBC          1/12/2025    03:20 1/13/2025    06:04 1/14/2025    05:36   CBC   WBC 11.90  15.31  14.58    RBC 4.40  4.05  4.02    Hemoglobin 12.3  11.3  11.1    Hematocrit 38.1  34.6  35.2    MCV 86.6  85.4  87.6    MCH 28.0  27.9  27.6    MCHC 32.3  32.7  31.5    RDW 12.8  13.1  13.0    Platelets 322  350  381      CMP          1/12/2025    03:20 1/13/2025    06:04 1/14/2025    05:36   CMP   Glucose 319  282  71    BUN 13  18  12    Creatinine 0.61  0.63  0.61    EGFR 93.4  92.6  93.4    Sodium 130  133  139    Potassium 4.5  3.8  4.0    Chloride 95  97  102    Calcium 8.8  8.8  8.9    Total Protein 6.8      Albumin 3.1      Globulin 3.7      Total Bilirubin 0.4      Alkaline Phosphatase 92      AST (SGOT) 21      ALT (SGPT) 10      Albumin/Globulin Ratio 0.8      BUN/Creatinine Ratio 21.3  28.6  19.7    Anion Gap 12.7  9.5  9.0          acetaminophen    aluminum-magnesium hydroxide-simethicone    benzonatate    dextrose    dextrose    Diclofenac Sodium     glucagon (human recombinant)    guaifenesin    hydrOXYzine    Lidocaine    melatonin    nicotine    ondansetron    polyethylene glycol    sodium chloride    sodium chloride    arformoterol, 15 mcg, Nebulization, BID - RT  aspirin, 81 mg, Oral, Daily  atorvastatin, 10 mg, Oral, Nightly  budesonide, 0.5 mg, Nebulization, BID - RT  cefTRIAXone, 1,000 mg, Intravenous, Q24H  [Held by provider] enoxaparin, 40 mg, Subcutaneous, Daily  gabapentin, 100 mg, Oral, TID  insulin glargine, 25 Units, Subcutaneous, Daily  insulin lispro, 2-7 Units, Subcutaneous, 4x Daily AC & at Bedtime  ipratropium-albuterol, 3 mL, Nebulization, Q4H - RT  letrozole, 2.5 mg, Oral, Daily  magnesium oxide, 400 mg, Oral, Daily  metoprolol tartrate, 25 mg, Oral, Daily  montelukast, 10 mg, Oral, Nightly  nystatin, 5 mL, Swish & Spit, 4x Daily  pantoprazole, 40 mg, Oral, Q AM  pentoxifylline, 400 mg, Oral, BID  predniSONE, 40 mg, Oral, Daily With Breakfast  sodium chloride, 10 mL, Intravenous, Q12H        CT Chest Without Contrast Diagnostic    Result Date: 1/12/2025  Impression: Consolidation in the posterior right upper lung measuring 6.3 x 6.2 cm which may reflect pneumonia with reactive right hilar lymphadenopathy however right hilar neoplastic process cannot be excluded and treatment and follow-up imaging to ensure resolution is recommended. Follow-up chest CT should be obtained with intravenous contrast for better evaluation of right hilar lesions. Bronchial wall thickening in the right upper and right lower lung with septal thickening associated with inflammatory changes. Electronically Signed: Beatris Sarmiento MD  1/12/2025 9:37 AM EST  Workstation ID: [id number]    XR Chest 1 View    Result Date: 1/11/2025  Impression: Right upper lobe pneumonia Electronically Signed: Pascual Sarmiento MD  1/11/2025 6:30 PM EST  Workstation ID: [id number]     Assessment / Plan     Summary: 74 y.o. history of type 2 diabetes, dyslipidemia, history of breast cancer,  coronary artery disease history of MI and CABG, COPD on home oxygen, and peripheral vascular disease who presents emergency department with cough and shortness of breath x 7 days     Patient states she started feeling poorly about 7 days prior. She has progressively become more short of breath and feels like her oxygen drops when she gets to move around.   She is also coughing up very brownish thick sputum.  She does not note having any fevers.  Or any chills.  Patient presented to the emergency department as she had not been improving at home. In the emergency department the patient's vital signs are as follows: Temperature is 98.5, pulse 102, respiratory 25, blood pressure 145/62, and satting 90% on room air.  CBC shows a white count of 13.  CMP shows a glucose 256.  Sodium 132.  COVID-19 and influenza are negative.  Rapid strep is negative.  Chest x-ray shows right upper lobe pneumonia.  She received ceftriaxone azithromycin.  Blood cultures were sent.  Patient admitted to hospital for acute on chronic hypoxic respiratory failure with community-acquired pneumonia. Obtained CT of chest, shows consolidation in the posterior right upper lung measuring 6.3 x 6.2 cm which may reflect pneumonia with reactive right hilar lymphadenopathy however right hilar neoplastic process cannot be excluded and treatment and follow-up imaging to ensure resolution is recommended.    Assessment/Plan (clinically significant if listed here)  Acute on chronic hypoxic respiratory failure  Right upper lobe pneumonia  Right hilar lymphadenopathy, right hilar neoplastic process not excluded, outpatient imaging follow-up needed  Acute COPD exacerbation ?on home oxygen  History of breast cancer on hormone blocker  Type 2 diabetes hyperglycemia  Thrush  Peripheral vascular disease  Coronary artery disease    Rvp/cultures negative thus far, f/u bronchoscopy cultures, narrow further as able  D/w pulm, appreciate assistance - ebus bronch/biopsies  today  S/p 3d of dayanna, continues on ceftriaxone  Cont bph/resp hygiene  Cont prednisone for copd  Wean o2 as able, does have home oxygen reportedly but may not actually have this based on social work note will need to confirm tomorrow as she may need this at discharge  Adjust insulin given npo this am for cath, monitor closely with steroids   Cont nystatin swish/spit  Continue home letrozole  Continue home metoprolol  Continue PPI  Continue pentoxifylline   PT/OT - eval pending still  Check a.m. CBC, BMP, magnesium, phosphorus  Continue hospitalization at current level of care    Dispo: possibly HH, pt lives alone, once medically stable.   D/w SW    VTE Prophylaxis:  Pharmacologic VTE prophylaxis orders are present.      Level Of Support Discussed With: Patient  Code Status (Patient has no pulse and is not breathing): CPR (Attempt to Resuscitate)  Medical Interventions (Patient has pulse or is breathing): Full Support    Greater than 50 minutes on this encounter including review of labs, imaging, documentation, orders, vitals, monitoring and adjusting treatment and orders as indicated, discussion with the patient, pulm, sw, rn, and documenting.

## 2025-01-15 NOTE — DISCHARGE SUMMARY
[facility]         HOSPITALIST  DISCHARGE SUMMARY    Patient Name: Gini Edmondson    : 1950    MRN: [id number]    Date of Admission: 2025  Date of Discharge:  01/15/25  Primary Care Physician: Cheyenne Russell, JACY    Consults       Date and Time Order Name Status Description    2025 12:57 PM Inpatient Pulmonology Consult Completed     2025  8:30 PM Inpatient Hospitalist Consult              Final Diagnosis:  Acute on chronic hypoxic respiratory failure  Right upper lobe pneumonia from unspecified bacterial organism, suspect gram negative   Additional +rhinovirus/enterovirus URI  RUL Mucus plugging  Right hilar lymphadenopathy, right hilar neoplastic process not excluded, outpatient imaging follow-up needed  Acute COPD exacerbation ?on home oxygen  History of breast cancer on hormone blocker  Type 2 diabetes hyperglycemia  Thrush  Peripheral vascular disease  Coronary artery disease    Hospital Course     Hospital Course:  74 y.o. history of type 2 diabetes, dyslipidemia, history of breast cancer, coronary artery disease history of MI and CABG, COPD on home oxygen, and peripheral vascular disease who presents emergency department with cough and shortness of breath x 7 days     Patient states she started feeling poorly about 7 days prior. She has progressively become more short of breath and feels like her oxygen drops when she gets to move around.   She is also coughing up very brownish thick sputum.  She does not note having any fevers.  Or any chills.  Patient presented to the emergency department as she had not been improving at home. In the emergency department the patient's vital signs are as follows: Temperature is 98.5, pulse 102, respiratory 25, blood pressure 145/62, and satting 90% on room air.  CBC shows a white count of 13.  CMP shows a glucose 256.  Sodium 132.  COVID-19 and influenza are negative.  Rapid strep is negative.  Chest x-ray shows right  upper lobe pneumonia.  She received ceftriaxone azithromycin.  Blood cultures were sent.  Patient admitted to hospital for acute on chronic hypoxic respiratory failure with community-acquired pneumonia. Obtained CT of chest, shows consolidation in the posterior right upper lung measuring 6.3 x 6.2 cm which may reflect pneumonia with reactive right hilar lymphadenopathy however right hilar neoplastic process cannot be excluded and treatment and follow-up imaging to ensure resolution is recommended.  --    Patient underwent bronchoscopy with biopsies, rhinovirus/enterovirus seen on pneumonia panel, biopsies pending at time of discharge.  With cancer history will need close follow-up with pulmonology outpatient for this as well as repeat imaging to monitor for resolution of lymphadenopathy.    Stable on room air on 6mwt by time of discharge.  Sending with 2d of augmentin, completed azithro inpatient.  Sending with a nebulizer and additional management assistance for COPD unclear what she is actually taking at home think she might have a nebulizer but I sent supplies in case and she can verify if she needs to get this filled but will have available if she otherwise does not have this currently.  Will follow-up with COPD/pulm clinic outpatient for final results of biopsy and monitoring as outpatient.  Will need monitoring for resolution of lymphadenopathy given her breast cancer history and hormonal therapies       Patient discharged in stable condition with close outpatient follow up. Return precautions and follow up discussed and patient voiced agreement and understanding of treatment plan.     DISCHARGE Follow Up Recommendations for labs and diagnostics:   As above -outpatient follow-up with pulmonology follow-up results of biopsy and repeat imaging to ensure resolution about 4 to 6 weeks needed    CODE STATUS:  Code Status and Medical Interventions: CPR (Attempt to Resuscitate); Full Support   Ordered at: 01/11/25  2104     Level Of Support Discussed With:    Patient     Code Status (Patient has no pulse and is not breathing):    CPR (Attempt to Resuscitate)     Medical Interventions (Patient has pulse or is breathing):    Full Support           Day of Discharge     Vital Signs:  Temp:  [97.2 °F (36.2 °C)-98.2 °F (36.8 °C)] 97.2 °F (36.2 °C)  Heart Rate:  [] 101  Resp:  [16-30] 18  BP: (104-147)/() 118/60  Flow (L/min) (Oxygen Therapy):  [1-7] 1    Physical Exam    Gen: awake, resting in bed, conversant  Resp: breathing comfortably on room air, no wheezing, doing much better  CV: RRR, no LE pitting edema      Discharge Details        Discharge Medications        New Medications        Instructions Start Date   amoxicillin-clavulanate 875-125 MG per tablet  Commonly known as: AUGMENTIN   1 tablet, Oral, Every 12 Hours Scheduled      benzonatate 200 MG capsule  Commonly known as: TESSALON   200 mg, Oral, 3 Times Daily PRN      ipratropium-albuterol 0.5-2.5 mg/3 ml nebulizer  Commonly known as: DUO-NEB   3 mL, Nebulization, Every 4 Hours PRN      nystatin 100,000 unit/mL suspension  Commonly known as: MYCOSTATIN   500,000 Units, Swish & Spit, 4 Times Daily      predniSONE 20 MG tablet  Commonly known as: DELTASONE   20 mg, Oral, Daily With Breakfast   Start Date: January 16, 2025     tiotropium bromide-olodaterol 2.5-2.5 MCG/ACT aerosol solution inhaler  Commonly known as: STIOLTO RESPIMAT   2 puffs, Inhalation, Daily - RT             Continue These Medications        Instructions Start Date   albuterol sulfate  (90 Base) MCG/ACT inhaler  Commonly known as: PROVENTIL HFA;VENTOLIN HFA;PROAIR HFA   1 puff, 2 Times Daily      ASPIRIN 81 PO   1 tablet/day, Daily      atorvastatin 10 MG tablet  Commonly known as: LIPITOR   10 mg, Daily      Biotin 1000 MCG tablet   1 tablet, Daily      fish oil 1000 MG capsule capsule   1 tablet, Daily      gabapentin 100 MG capsule  Commonly known as: NEURONTIN   100 mg, 3 Times  Daily      insulin glargine 100 UNIT/ML injection  Commonly known as: LANTUS, SEMGLEE   25 Units, Nightly      letrozole 2.5 MG tablet  Commonly known as: FEMARA   2.5 mg, Oral, Daily      Magnesium 500 MG capsule   1 tablet, As Needed      metoprolol tartrate 25 MG tablet  Commonly known as: LOPRESSOR   25 mg, Daily      montelukast 10 MG tablet  Commonly known as: SINGULAIR   10 mg, Nightly      omeprazole 40 MG capsule  Commonly known as: priLOSEC   Take 1 capsule by mouth Daily As Needed (FOR ACID REFLUX).      pentoxifylline 400 MG CR tablet  Commonly known as: TRENtal   1 tablet, 2 times daily      senna 8.6 MG tablet  Commonly known as: SENOKOT   1 tablet, Daily                 Discharge Disposition:  Home or Self Care    Diet: continue on diet / dietary restrictions from hospitalization     Discharge Activity: advance as tolerated      Additional Instructions for the Follow-ups that You Need to Schedule       Discharge Follow-up with PCP   As directed       Currently Documented PCP:    Cheyenne Russell APRN    PCP Phone Number:    [phone]     Follow Up Details: 3-5 days hospital f/u                Pertinent  and/or Most Recent Results       LAB RESULTS:      Lab 01/15/25  0759 01/14/25  0536 01/13/25  0604 01/12/25  0320 01/11/25  2320 01/11/25 2015 01/11/25  1830   WBC 15.13* 14.58* 15.31* 11.90*  --   --  13.59*   HEMOGLOBIN 11.5* 11.1* 11.3* 12.3  --   --  12.7   HEMATOCRIT 36.7 35.2 34.6 38.1  --   --  40.2   PLATELETS 398 381 350 322  --   --  333   NEUTROS ABS 9.83*  --   --  10.73*  --   --  9.71*   IMMATURE GRANS (ABS) 0.05  --   --  0.05  --   --  0.08*   LYMPHS ABS 3.48*  --   --  0.68*  --   --  1.57   MONOS ABS 1.69*  --   --  0.41  --   --  1.88*   EOS ABS 0.06  --   --  0.00  --   --  0.31   MCV 87.8 87.6 85.4 86.6  --   --  87.0   PROCALCITONIN 0.15  --   --  0.67*  --  0.76*  --    LACTATE  --   --   --   --  1.8 2.3*  --          Lab 01/15/25  0759 01/14/25  0536 01/13/25  0604  01/12/25 0320 01/11/25  1830   SODIUM 136 139 133* 130* 132*   POTASSIUM 4.1 4.0 3.8 4.5 3.9   CHLORIDE 98 102 97* 95* 93*   CO2 28.5 28.0 26.5 22.3 26.0   ANION GAP 9.5 9.0 9.5 12.7 13.0   BUN 10 12 18 13 15   CREATININE 0.59 0.61 0.63 0.61 0.75   EGFR 94.1 93.4 92.6 93.4 83.1   GLUCOSE 136* 71 282* 319* 256*   CALCIUM 8.7 8.9 8.8 8.8 9.7   MAGNESIUM 1.7 1.9 2.0 2.0  --    PHOSPHORUS 3.1  --   --   --   --          Lab 01/12/25 0320 01/11/25  1830   TOTAL PROTEIN 6.8 7.5   ALBUMIN 3.1* 3.4*   GLOBULIN 3.7 4.1   ALT (SGPT) 10 9   AST (SGOT) 21 20   BILIRUBIN 0.4 0.5   ALK PHOS 92 102         Lab 01/11/25 2015 01/11/25  1830   PROBNP  --  513.3   HSTROP T 14* 19*                 Brief Urine Lab Results       None          Microbiology Results (last 10 days)       Procedure Component Value - Date/Time    Respiratory Culture - Wash, Bronchus [[id number]] Collected: 01/14/25 1424    Lab Status: Preliminary result Specimen: Wash from Bronchus Updated: 01/15/25 1142     Respiratory Culture No growth     Gram Stain Rare (1+) WBCs seen      Rare (1+) Gram negative bacilli    BAL Culture, Quantitative - Lavage, Lung, Right Upper Lobe [[id number]] Collected: 01/14/25 1348    Lab Status: Preliminary result Specimen: Lavage from Lung, Right Upper Lobe Updated: 01/15/25 1149     BAL Culture No growth     Gram Stain Moderate (3+) WBCs seen      Occasional Gram positive bacilli      Occasional Gram negative bacilli    Pneumonia Panel - Lavage, Lung, Right Upper Lobe [[id number]]  (Abnormal) Collected: 01/14/25 1348    Lab Status: Final result Specimen: Lavage from Lung, Right Upper Lobe Updated: 01/14/25 1700     Escherichia coli PCR Not Detected     Acinetobacter calcoaceticus-baumannii complex PCR Not Detected     Enterobacter cloacae PCR Not Detected     Klebsiella oxytoca PCR Not Detected     Klebsiella pneumoniae group PCR Not Detected     Klebsiella aerogenes PCR Not Detected     Moraxella catarrhalis PCR Not Detected      Proteus species PCR Not Detected     Pseudomonas aeroginosa PCR Not Detected     Serratia marcescens PCR Not Detected     Staphylococcus aureus PCR Not Detected     Streptococcus pyogenes PCR Not Detected     Haemophilus influenzae PCR Not Detected     Streptococcus agalactiae PCR Not Detected     Streptococcus pneumoniae PCR Not Detected     Chlamydophila pneumoniae PCR Not Detected     Legionella pneumophilia PCR Not Detected     Mycoplasma pneumo by PCR Not Detected     ADENOVIRUS, PCR Not Detected     CTX-M Gene N/A     IMP Gene N/A     KPC Gene N/A     mecA/C and MREJ Gene N/A     NDM Gene N/A     OXA-48-like Gene N/A     VIM Gene N/A     Coronavirus Not Detected     Human Metapneumovirus Not Detected     Human Rhinovirus/Enterovirus Detected     Influenza A PCR Not Detected     Influenza B PCR Not Detected     RSV, PCR Not Detected     Parainfluenza virus PCR Not Detected    Respiratory Panel PCR w/COVID-19(SARS-CoV-2) [facility]UHI/[facility]TON/[facility] In-House, NP Swab in UTM/VTM, 2 HR TAT - Swab, Nasopharynx [[id number]]  (Normal) Collected: 01/13/25 1615    Lab Status: Final result Specimen: Swab from Nasopharynx Updated: 01/13/25 1900     ADENOVIRUS, PCR Not Detected     Coronavirus 229E Not Detected     Coronavirus HKU1 Not Detected     Coronavirus NL63 Not Detected     Coronavirus OC43 Not Detected     COVID19 Not Detected     Human Metapneumovirus Not Detected     Human Rhinovirus/Enterovirus Not Detected     Influenza A PCR Not Detected     Influenza B PCR Not Detected     Parainfluenza Virus 1 Not Detected     Parainfluenza Virus 2 Not Detected     Parainfluenza Virus 3 Not Detected     Parainfluenza Virus 4 Not Detected     RSV, PCR Not Detected     Bordetella pertussis pcr Not Detected     Bordetella parapertussis PCR Not Detected     Chlamydophila pneumoniae PCR Not Detected     Mycoplasma pneumo by PCR Not Detected    Narrative:      In the setting of a positive respiratory panel with a viral infection PLUS  a negative procalcitonin without other underlying concern for bacterial infection, consider observing off antibiotics or discontinuation of antibiotics and continue supportive care. If the respiratory panel is positive for atypical bacterial infection (Bordetella pertussis, Chlamydophila pneumoniae, or Mycoplasma pneumoniae), consider antibiotic de-escalation to target atypical bacterial infection.    S. Pneumo Ag Urine or CSF - Urine, Urine, Clean Catch [[id number]]  (Normal) Collected: 01/12/25 1153    Lab Status: Final result Specimen: Urine, Clean Catch Updated: 01/12/25 1425     Strep Pneumo Ag Negative    Legionella Antigen, Urine - Urine, Urine, Clean Catch [[id number]]  (Normal) Collected: 01/12/25 1153    Lab Status: Final result Specimen: Urine, Clean Catch Updated: 01/12/25 1423     LEGIONELLA ANTIGEN, URINE Negative    Respiratory Culture - Sputum, Cough [[id number]] Collected: 01/11/25 2340    Lab Status: Final result Specimen: Sputum from Cough Updated: 01/14/25 1120     Respiratory Culture Scant growth (1+) Normal respiratory ping. No S. aureus or Pseudomonas aeruginosa detected. Final report.     Gram Stain Many (4+) WBCs seen      Moderate (3+) Gram positive cocci in pairs and chains      Few (2+) Mucous strands      Moderate (3+) Epithelial cells seen    Blood Culture - Blood, Arm, Left [[id number]]  (Normal) Collected: 01/11/25 2015    Lab Status: Preliminary result Specimen: Blood from Arm, Left Updated: 01/14/25 2045     Blood Culture No growth at 3 days    Narrative:      Less than seven (7) mL's of blood was collected.  Insufficient quantity may yield false negative results.    Blood Culture - Blood, Arm, Right [[id number]]  (Normal) Collected: 01/11/25 2015    Lab Status: Preliminary result Specimen: Blood from Arm, Right Updated: 01/14/25 2045     Blood Culture No growth at 3 days    Narrative:      Less than seven (7) mL's of blood was collected.  Insufficient quantity may yield false negative  results.    Mycoplasma Pneumoniae Antibody, IgM - Blood, [[id number]]  (Normal) Collected: 01/11/25 1830    Lab Status: Final result Specimen: Blood Updated: 01/11/25 2123     Mycoplasma pneumo IgM Negative    COVID-19, FLU A/B, RSV PCR 1 HR TAT - Swab, Nasopharynx [[id number]]  (Normal) Collected: 01/11/25 1826    Lab Status: Final result Specimen: Swab from Nasopharynx Updated: 01/11/25 1924     COVID19 Not Detected     Influenza A PCR Not Detected     Influenza B PCR Not Detected     RSV, PCR Not Detected    Narrative:      Fact sheet for providers: https://www.fda.gov/media/079325/download    Fact sheet for patients: https://www.fda.gov/media/902628/download    Test performed by PCR.    Rapid Strep A Screen - Swab, Throat [[id number]]  (Normal) Collected: 01/11/25 1826    Lab Status: Final result Specimen: Swab from Throat Updated: 01/11/25 1906     Strep A Ag Negative    Beta Strep Culture, Throat - Swab, Throat [[id number]]  (Normal) Collected: 01/11/25 1826    Lab Status: Final result Specimen: Swab from Throat Updated: 01/13/25 1146     Throat Culture, Beta Strep No Beta Hemolytic Streptococcus Isolated    Narrative:      Group A Strep incidence is low in adults. Positive culture for Beta hemolytic Streptococcus species can reflect colonization and not true infection. Please correlate clinically.            RADIOLOGY:    CT Chest Without Contrast Diagnostic    Result Date: 1/12/2025  Impression: Impression: Consolidation in the posterior right upper lung measuring 6.3 x 6.2 cm which may reflect pneumonia with reactive right hilar lymphadenopathy however right hilar neoplastic process cannot be excluded and treatment and follow-up imaging to ensure resolution is recommended. Follow-up chest CT should be obtained with intravenous contrast for better evaluation of right hilar lesions. Bronchial wall thickening in the right upper and right lower lung with septal thickening associated with inflammatory changes.  Electronically Signed: Beatris Sarmiento MD  1/12/2025 9:37 AM EST  Workstation ID: [id number]    XR Chest 1 View    Result Date: 1/11/2025  Impression: Impression: Right upper lobe pneumonia Electronically Signed: Pascual Sarmiento MD  1/11/2025 6:30 PM EST  Workstation ID: [id number]     Results for orders placed during the hospital encounter of 07/27/22    Doppler Arterial Multi Level Lower Extremity - Bilateral CAR    Interpretation Summary  · Right Conclusion: The right PARIS is severely reduced. Waveforms are consistent with aorto-iliac disease and femoral disease. Moderate digital ischemia.  · Left Conclusion: The left PARIS is severely reduced. Waveforms are consistent with aorto-iliac disease and femoral disease. Severe digital ischemia.  · These findings are similar to that noted on study dated 10/6/2020.  · Clinical and/or angiographic correlation is advised regarding these findings.      Results for orders placed during the hospital encounter of 07/27/22    Doppler Arterial Multi Level Lower Extremity - Bilateral CAR    Interpretation Summary  · Right Conclusion: The right PARIS is severely reduced. Waveforms are consistent with aorto-iliac disease and femoral disease. Moderate digital ischemia.  · Left Conclusion: The left PARIS is severely reduced. Waveforms are consistent with aorto-iliac disease and femoral disease. Severe digital ischemia.  · These findings are similar to that noted on study dated 10/6/2020.  · Clinical and/or angiographic correlation is advised regarding these findings.          Labs Pending at Discharge:  Pending Labs       Order Current Status    CBC & Differential Collected (01/14/25 1348)    CBC Auto Differential Collected (01/14/25 1348)    Fine Needle Aspiration Collected (01/14/25 1415)    AFB Culture - Lavage, Lung, Right Upper Lobe In process    Fungus Culture - Lavage, Lung, Right Upper Lobe In process    Non-gynecologic Cytology In process    Virus Culture - Lavage, Lung, Right Upper  Lobe In process    BAL Culture, Quantitative - Lavage, Lung, Right Upper Lobe Preliminary result    Blood Culture - Blood, Arm, Left Preliminary result    Blood Culture - Blood, Arm, Right Preliminary result    Respiratory Culture - Wash, Bronchus Preliminary result              Time spent on Discharge including face to face service:  >33 minutes

## 2025-01-15 NOTE — PLAN OF CARE
Goal Outcome Evaluation:  Plan of Care Reviewed With: patient        Progress: no change  Outcome Evaluation: Patient continues on 1.5l-2LNC. Productive, congested cough with blood tinged sputum. Order obtained for chloraseptic spray d/t sore throat. Self administered and patient reports relief. PRN sleeping and cough med also given. Patient reports not sleeping well since admit. Interruptions minimized and care clustered. Loss of V access overnight. Patient refused restart

## 2025-01-15 NOTE — THERAPY EVALUATION
Patient Name: Gini Edmondson  : 1950    MRN: [id number]                              Today's Date: 1/15/2025       Admit Date: 2025    Visit Dx:     ICD-10-CM ICD-9-CM   1. Pneumonia of right upper lobe due to infectious organism  J18.9 486   2. Chronic obstructive pulmonary disease, unspecified COPD type  J44.9 496   3. Chronic respiratory failure with hypoxia  J96.11 518.83     799.02   4. Uncontrolled type 2 diabetes mellitus with hyperglycemia  E11.65 250.02   5. Dysphagia, unspecified type  R13.10 787.20   6. Decreased activities of daily living (ADL)  Z78.9 V49.89     Patient Active Problem List   Diagnosis    PAD (peripheral artery disease)    Asthma    Chronic obstructive pulmonary disease    Depression    Diabetes mellitus, type II    Hypertension, essential    Goiter    Raynaud's syndrome    Malignant neoplasm of lower-outer quadrant of left breast of female, estrogen receptor positive    Abdominal pain    Personal history of colonic polyps    Pneumonia     Past Medical History:   Diagnosis Date    Allergy to sunlight     PER PT    Asthma     Cancer of left breast     INVASVIE LOBULAR CARCINOMA    Cardiac disorder     DR. MALCOLM ANTHONY    Condition not found     RIGHT EYE CATARACT (LT CATARACT REPAIRED)    Constipation     COPD (chronic obstructive pulmonary disease)     Diabetes mellitus     Elevated cholesterol     Heart attack     3-22-18 NON STEMI, CABG 3-23-18    Hx of bronchitis     SMOKES 5-7 CIGERETTS PER DAY    Hx of CABG     3V CABG     Hypertension     MEDS CONTROL    Neuropathy     IN FEETH FROM DM    PAD (peripheral artery disease)     Raynaud's syndrome     Status post left breast lumpectomy      Past Surgical History:   Procedure Laterality Date    BREAST LUMPECTOMY      LEFT    BRONCHOSCOPY N/A 2025    Procedure: BRONCHOSCOPY WITH ENDOBRONCHIAL ULTRASOUND WITH FINE NEEDLE ASPIRATIONS, BRONCHEOALVEOLAR LAVAGE, AND WASHINGS;  Surgeon: Simi Birmingham MD;  Location:   [facility] ENDOSCOPY;  Service: Pulmonary;  Laterality: N/A;  lymphadenopathy    CARDIAC CATHETERIZATION N/A 9/15/2022    Procedure: Aortogram with bilateral leg angiogram, possible angioplasty or stenting;  Surgeon: Jose Armando Mcbride MD;  Location: [facility] CATH INVASIVE LOCATION;  Service: Vascular;  Laterality: N/A;    COLONOSCOPY      COLONOSCOPY N/A 7/9/2024    Procedure: COLONOSCOPY WITH BIOPSY POLYPECTOMY;  Surgeon: Dheeraj Cho MD;  Location: [facility] ENDOSCOPY;  Service: General;  Laterality: N/A;  COLON POLYP, DIVERTICULOSIS    CORONARY ARTERY BYPASS GRAFT      LAPAROSCOPIC TUBAL LIGATION      OTHER SURGICAL HISTORY      BILAT PAD LEGS    OTHER SURGICAL HISTORY      CHIP IN LEFT BREAST, MARKER FOR SPOT    OTHER SURGICAL HISTORY      LEFT KNEE REPAIR      General Information       Row Name 01/15/25 1131          OT Time and Intention    Document Type evaluation  -AC     Mode of Treatment individual therapy;occupational therapy  -AC     Patient Effort good  -       Row Name 01/15/25 1131          General Information    Patient Profile Reviewed yes  -AC     Prior Level of Function --  patient reports (I) with adls and functional mobility without AD. She states she is also (I) with IADLS, drives.  -     Existing Precautions/Restrictions no known precautions/restrictions  -     Barriers to Rehab none identified  -       Row Name 01/15/25 1131          Occupational Profile    Reason for Services/Referral (Occupational Profile) Pt. is a 74year old female admitted for the above diagnosis. Pt. referred to OT services to assess independence with ADLs and adl transfers/fx'l mobility. No previous OT services for current condition.  -       Row Name 01/15/25 1131          Living Environment    People in Home alone  -       Row Name 01/15/25 1131          Home Main Entrance    Number of Stairs, Main Entrance one  -       Row Name 01/15/25 1131          Cognition    Orientation Status (Cognition) oriented x 3   -       Row Name 01/15/25 1131          Safety Issues/Impairments Affecting Functional Mobility    Comment, Safety Issues/Impairments (Mobility) none identified  -               User Key  (r) = Recorded By, (t) = Taken By, (c) = Cosigned By      Initials Name Provider Type    Allyssa Boone OT Occupational Therapist                     Mobility/ADL's       Row Name 01/15/25 1135          Bed Mobility    Bed Mobility bed mobility (all) activities  -     All Activities, Arabi (Bed Mobility) independent  -[facility] Name 01/15/25 1135          Transfers    Transfers sit-stand transfer  -[facility] Name 01/15/25 1135          Sit-Stand Transfer    Sit-Stand Arabi (Transfers) independent  -[facility] Name 01/15/25 1135          Functional Mobility    Functional Mobility- Ind. Level independent  -     Functional Mobility- Comment patient (I) with functional mobility in room without AD and without loss of balance. patient states she has been walking back and forth to bathroom (I).  -[facility] Name 01/15/25 1135          Activities of Daily Living    BADL Assessment/Intervention --  patient is (I) with bathing/dressing, (I) with grooming, (I) with toileting, (I) with self-feeding  -               User Key  (r) = Recorded By, (t) = Taken By, (c) = Cosigned By      Initials Name Provider Type    Allyssa Boone OT Occupational Therapist                   Obj/Interventions       [facility] Name 01/15/25 1136          Sensory Assessment (Somatosensory)    Sensory Assessment (Somatosensory) UE sensation intact  -[facility] Name 01/15/25 1136          Vision Assessment/Intervention    Visual Impairment/Limitations WFL;corrective lenses full-time  -[facility] Name 01/15/25 1136          Range of Motion Comprehensive    General Range of Motion bilateral upper extremity ROM WNL  -[facility] Name 01/15/25 1136          Strength Comprehensive (MMT)    General Manual Muscle Testing (MMT) Assessment  no strength deficits identified  -       Row Name 01/15/25 1136          Motor Skills    Motor Skills coordination;functional endurance  -AC     Coordination WNL  -     Functional Endurance fair plus/good for adls.  -       Row Name 01/15/25 1136          Balance    Balance Assessment standing dynamic balance  -AC     Dynamic Standing Balance independent  -AC     Position/Device Used, Standing Balance unsupported  -               User Key  (r) = Recorded By, (t) = Taken By, (c) = Cosigned By      Initials Name Provider Type    Allyssa Boone, OT Occupational Therapist                   Goals/Plan    No documentation.                  Clinical Impression       [facility] Name 01/15/25 1137          Pain Assessment    Pretreatment Pain Rating 0/10 - no pain  -AC     Posttreatment Pain Rating 0/10 - no pain  -       Row Name 01/15/25 1137          Plan of Care Review    Plan of Care Reviewed With patient  -     Progress no change  -     Outcome Evaluation Patient not appropriate for skilled OT services at this time as patient has not had a decline in ADLs or ADL transfers/fx'l mobility. patient safe to d/c to previous setting when medically appropriate. d/c occupational therapy services.  -       Row Name 01/15/25 1137          Therapy Assessment/Plan (OT)    Patient/Family Therapy Goal Statement (OT) NA  -AC     Rehab Potential (OT) --  NA  -     Criteria for Skilled Therapeutic Interventions Met (OT) no;no problems identified which require skilled intervention  -     Therapy Frequency (OT) evaluation only  -       Row Name 01/15/25 1137          Therapy Plan Review/Discharge Plan (OT)    Anticipated Discharge Disposition (OT) home  -       Row Name 01/15/25 1137          Positioning and Restraints    Pre-Treatment Position in bed  -AC     Post Treatment Position bed  -AC     In Bed fowlers;call light within reach;encouraged to call for assist  -AC               User Key  (r) = Recorded By, (t) =  Taken By, (c) = Cosigned By      Initials Name Provider Type    Allyssa Boone OT Occupational Therapist                   Outcome Measures       Row Name 01/15/25 1137          How much help from another is currently needed...    Putting on and taking off regular lower body clothing? 4  -AC     Bathing (including washing, rinsing, and drying) 4  -AC     Toileting (which includes using toilet bed pan or urinal) 4  -AC     Putting on and taking off regular upper body clothing 4  -AC     Taking care of personal grooming (such as brushing teeth) 4  -AC     Eating meals 4  -AC     AM-PAC 6 Clicks Score (OT) 24  -AC       Row Name 01/15/25 0805          How much help from another person do you currently need...    Turning from your back to your side while in flat bed without using bedrails? 4  -HL     Moving from lying on back to sitting on the side of a flat bed without bedrails? 4  -HL     Moving to and from a bed to a chair (including a wheelchair)? 4  -HL     Standing up from a chair using your arms (e.g., wheelchair, bedside chair)? 4  -HL     Climbing 3-5 steps with a railing? 3  -HL     To walk in hospital room? 4  -HL     AM-PAC 6 Clicks Score (PT) 23  -HL       Row Name 01/15/25 1137          Functional Assessment    Outcome Measure Options AM-PAC 6 Clicks Daily Activity (OT);Optimal Instrument  -AC       Row Name 01/15/25 1137          Optimal Instrument    Optimal Instrument Optimal - 3  -AC     Bending/Stooping 1  -AC     Standing 1  -AC     Reaching 1  -AC     From the list, choose the 3 activities you would most like to be able to do without any difficulty Bending/stooping;Standing;Reaching  -AC     Total Score Optimal - 3 3  -AC               User Key  (r) = Recorded By, (t) = Taken By, (c) = Cosigned By      Initials Name Provider Type    Violeta Anton, RN Registered Nurse    Allyssa Boone OT Occupational Therapist                    Occupational Therapy Education       Title: PT OT SLP Therapies  (Done)       Topic: Occupational Therapy (Done)       Point: ADL training (Done)       Description:   Instruct learner(s) on proper safety adaptation and remediation techniques during self care or transfers.   Instruct in proper use of assistive devices.                  Learning Progress Summary            Patient Acceptance, E, VU by  at 1/15/2025 1138                      Point: Home exercise program (Done)       Description:   Instruct learner(s) on appropriate technique for monitoring, assisting and/or progressing therapeutic exercises/activities.                  Learning Progress Summary            Patient Acceptance, E, VU by  at 1/15/2025 1138                      Point: Precautions (Done)       Description:   Instruct learner(s) on prescribed precautions during self-care and functional transfers.                  Learning Progress Summary            Patient Acceptance, E, VU by  at 1/15/2025 1138                      Point: Body mechanics (Done)       Description:   Instruct learner(s) on proper positioning and spine alignment during self-care, functional mobility activities and/or exercises.                  Learning Progress Summary            Patient Acceptance, E, VU by  at 1/15/2025 1138                                      User Key       Initials Effective Dates Name Provider Type Discipline     06/16/21 -  Allyssa Costello OT Occupational Therapist OT                  OT Recommendation and Plan  Therapy Frequency (OT): evaluation only  Plan of Care Review  Plan of Care Reviewed With: patient  Progress: no change  Outcome Evaluation: Patient not appropriate for skilled OT services at this time as patient has not had a decline in ADLs or ADL transfers/fx'l mobility. patient safe to d/c to previous setting when medically appropriate. d/c occupational therapy services.     Time Calculation:   Evaluation Complexity (OT)  Review Occupational Profile/Medical/Therapy History Complexity: brief/low  complexity  Assessment, Occupational Performance/Identification of Deficit Complexity: 1-3 performance deficits  Clinical Decision Making Complexity (OT): problem focused assessment/low complexity  Overall Complexity of Evaluation (OT): low complexity     Time Calculation- OT       Row Name 01/15/25 1139             Time Calculation- OT    OT Received On 01/15/25  -AC         Untimed Charges    OT Eval/Re-eval Minutes 26  -AC         Total Minutes    Untimed Charges Total Minutes 26  -AC       Total Minutes 26  -AC                User Key  (r) = Recorded By, (t) = Taken By, (c) = Cosigned By      Initials Name Provider Type    AC Allyssa Costello OT Occupational Therapist                  Therapy Charges for Today       Code Description Service Date Service Provider Modifiers Qty    [id number] HC OT EVAL LOW COMPLEXITY 2 1/15/2025 Allyssa Costello OT GO 1                 Allyssa Costello OT  1/15/2025

## 2025-01-16 LAB
BACTERIA SPEC AEROBE CULT: NO GROWTH
BACTERIA SPEC AEROBE CULT: NORMAL
BACTERIA SPEC AEROBE CULT: NORMAL
BACTERIA SPEC RESP CULT: NO GROWTH
CYTO UR: NORMAL
GRAM STN SPEC: NORMAL
LAB AP CASE REPORT: NORMAL
LAB AP CLINICAL INFORMATION: NORMAL
LAB AP SPECIAL STAINS: NORMAL
PATH REPORT.FINAL DX SPEC: NORMAL
PATH REPORT.GROSS SPEC: NORMAL

## 2025-01-16 NOTE — OUTREACH NOTE
Prep Survey      Flowsheet Row Responses   [facility]? [facility]   Is LACE score < 7 ? No   Eligibility Readm Mgmt   Discharge diagnosis Pneumonia   Does the patient have one of the following disease processes/diagnoses(primary or secondary)? Pneumonia   Does the patient have Home health ordered? No   Is there a DME ordered? Yes   What DME was ordered? home nebulizer   Prep survey completed? Yes            Pat JEWELL - Registered Nurse

## 2025-01-19 LAB
FUNGUS WND CULT: NORMAL
MYCOBACTERIUM SPEC CULT: NORMAL
NIGHT BLUE STAIN TISS: NORMAL
NIGHT BLUE STAIN TISS: NORMAL

## 2025-01-21 ENCOUNTER — READMISSION MANAGEMENT (OUTPATIENT)
Dept: CALL CENTER | Facility: HOSPITAL | Age: 75
End: 2025-01-21
Payer: MEDICARE

## 2025-01-22 LAB
QT INTERVAL: 353 MS
QTC INTERVAL: 456 MS

## 2025-01-23 LAB — VIRUS SPEC CULT: NORMAL

## 2025-01-24 ENCOUNTER — OFFICE VISIT (OUTPATIENT)
Dept: PULMONOLOGY | Facility: CLINIC | Age: 75
End: 2025-01-24
Payer: MEDICARE

## 2025-01-24 VITALS
OXYGEN SATURATION: 98 % | HEIGHT: 62 IN | WEIGHT: 141.4 LBS | RESPIRATION RATE: 18 BRPM | BODY MASS INDEX: 26.02 KG/M2 | TEMPERATURE: 98.1 F | HEART RATE: 73 BPM | DIASTOLIC BLOOD PRESSURE: 62 MMHG | SYSTOLIC BLOOD PRESSURE: 111 MMHG

## 2025-01-24 DIAGNOSIS — R06.09 DYSPNEA ON EXERTION: ICD-10-CM

## 2025-01-24 DIAGNOSIS — C50.512 MALIGNANT NEOPLASM OF LOWER-OUTER QUADRANT OF LEFT BREAST OF FEMALE, ESTROGEN RECEPTOR POSITIVE: ICD-10-CM

## 2025-01-24 DIAGNOSIS — Z17.0 MALIGNANT NEOPLASM OF LOWER-OUTER QUADRANT OF LEFT BREAST OF FEMALE, ESTROGEN RECEPTOR POSITIVE: ICD-10-CM

## 2025-01-24 DIAGNOSIS — J44.9 CHRONIC OBSTRUCTIVE PULMONARY DISEASE, UNSPECIFIED COPD TYPE: ICD-10-CM

## 2025-01-24 DIAGNOSIS — R59.0 THORACIC LYMPHADENOPATHY: Primary | ICD-10-CM

## 2025-01-24 DIAGNOSIS — Z72.0 TOBACCO ABUSE: ICD-10-CM

## 2025-01-24 DIAGNOSIS — Z71.6 ENCOUNTER FOR SMOKING CESSATION COUNSELING: ICD-10-CM

## 2025-01-24 DIAGNOSIS — J18.9 PNEUMONIA OF RIGHT UPPER LOBE DUE TO INFECTIOUS ORGANISM: ICD-10-CM

## 2025-01-24 RX ORDER — INHALER, ASSIST DEVICES
SPACER (EA) MISCELLANEOUS
COMMUNITY
Start: 2025-01-22

## 2025-01-24 RX ORDER — NYSTATIN 100000 [USP'U]/ML
500000 SUSPENSION ORAL 4 TIMES DAILY
COMMUNITY
Start: 2025-01-22

## 2025-01-24 RX ORDER — FLUCONAZOLE 150 MG/1
150 TABLET ORAL EVERY 24 HOURS
COMMUNITY
Start: 2025-01-22

## 2025-01-24 RX ORDER — BUDESONIDE, GLYCOPYRROLATE, AND FORMOTEROL FUMARATE 160; 9; 4.8 UG/1; UG/1; UG/1
2 AEROSOL, METERED RESPIRATORY (INHALATION) 2 TIMES DAILY
Qty: 1 EACH | Refills: 11 | Status: SHIPPED | OUTPATIENT
Start: 2025-01-24

## 2025-01-24 NOTE — PROGRESS NOTES
Primary Care Provider  Cheyenne Russell APRN   Referring Provider  Colt Cowart MD      Patient Complaint  Hospital Follow Up Visit, Shortness of Breath, and Results      Subjective          Gini Edmondson presents to [facility]    Patient or patient representative verbalized consent for the use of Ambient Listening during the visit with  JACY Joseph for chart documentation. 1/24/2025  12:02 EST    History of Presenting Illness  Gini Edmondson is a 74 y.o. female patient with COPD/emphysema, recent pneumonia, thoracic lymphadenopathy, history of breast cancer, and tobacco use ongoing, here for hospital follow-up.    Patient was hospitalized at [facility] 1/11/2025 through 1/15/2025 for acute on chronic hypoxic respiratory failure, right upper lobe pneumonia.  She presented to the emergency department with cough and shortness of breath for the past week.  She was coughing up brownish, thick sputum.  White blood cells elevated at 13, infectious workup negative.  Chest imaging showed right upper lobe pneumonia.  Patient was started on IV antibiotics, steroids, and scheduled bronchodilator treatments.  Chest CT showed possibly reactive right hilar lymphadenopathy, though neoplastic process could not be excluded.  Pulmonology was consulted and patient underwent EBUS bronchoscopy with biopsy of multiple lymph nodes.  Her pneumonia panel was positive for human rhinovirus/enterovirus.  Patient's lymph node biopsies were all normal aside from station 11R showing atypical cells.  Patient began to improve clinically, passed walk test on room air at the time of her discharge.  She was instructed to complete Augmentin at home and follow-up with pulmonology outpatient for finalized biopsy results.    Patient states she is feeling better since being discharged from the hospital, reports having completed the Augmentin and prednisone that was  prescribed at discharge.  She is new to our outpatient clinic, has never been seen by pulmonology before, though she was diagnosed with COPD about 15 years ago.  This was her first hospitalization for respiratory issues.  Patient denies any fevers or chills.  Her primary respiratory complaint is shortness of breath with exertion.  Patient continues to use Stiolto daily, which is new for her since hospitalization.  She has been finding it challenging to use due to the twisting mechanism.  Patient has used inhalers in the past without any issue.  As well as her albuterol inhaler and DuoNeb treatments as needed.  She is a current smoker, has cut back to half a pack per day, 27 pack years.  She feels it may be difficult for her to quit as she states that smoking helps manage her anxiety and nervousness.  Patient denies any hemoptysis, swollen lymph nodes, weight loss, or night sweats.  Patient continues to follow-up with Dr. Moore with oncology for management of breast cancer in remission, has been taking letrozole for the past 5 years.  Overall, patient is doing well and has no additional concerns at this time.  Patient is able to perform ADLs without difficulty.  I have personally reviewed the review of systems, past family, social, medical and surgical histories; and agree with their findings.      Review of Systems    Review of Systems   Constitutional:  Negative for activity change, chills, fatigue, fever, unexpected weight gain and unexpected weight loss.   HENT:  Negative for congestion, ear discharge, ear pain, mouth sores, postnasal drip, rhinorrhea, sinus pressure, sore throat, swollen glands and trouble swallowing.    Eyes:  Negative for visual disturbance.   Respiratory:  Positive for shortness of breath (with exertion). Negative for apnea, cough, chest tightness, wheezing and stridor.    Cardiovascular:  Negative for chest pain, palpitations and leg swelling.   Gastrointestinal:  Negative for abdominal  distention, abdominal pain, nausea, vomiting, GERD and indigestion.   Musculoskeletal:  Negative for arthralgias, joint swelling and myalgias.   Skin:  Negative for color change.   Neurological:  Negative for dizziness, weakness, light-headedness and headache.      Sleep: Negative for Excessive daytime sleepiness  Negative for morning headaches  Negative for Snoring      Family History   Problem Relation Age of Onset    Stroke Mother     Prostate cancer Father     Diabetes Father     Pancreatic cancer Brother     Diabetes Brother     Breast cancer Maternal Aunt     Breast cancer Maternal Aunt     Breast cancer Paternal Aunt     Diabetes Other     Hypertension Other     Malig Hyperthermia Neg Hx         Social History     Socioeconomic History    Marital status:    Tobacco Use    Smoking status: Every Day     Current packs/day: 0.25     Average packs/day: 0.5 packs/day for 54.5 years (27.0 ttl pk-yrs)     Types: Cigarettes     Start date: 7/7/1970    Smokeless tobacco: Never    Tobacco comments:     7-10 CIGARETTES     LAST SMOKED 5/2 0400   Vaping Use    Vaping status: Never Used   Substance and Sexual Activity    Alcohol use: Not Currently    Drug use: Never    Sexual activity: Defer        Past Medical History:   Diagnosis Date    Allergy to sunlight     PER PT    Asthma     Cancer of left breast     INVASVIE LOBULAR CARCINOMA    Cardiac disorder     DR. MALCOLM ANTHONY    Condition not found     RIGHT EYE CATARACT (LT CATARACT REPAIRED)    Constipation     COPD (chronic obstructive pulmonary disease)     Diabetes mellitus     Elevated cholesterol     Heart attack     3-22-18 NON STEMI, CABG 3-23-18    Hx of bronchitis     SMOKES 5-7 CIGERETTS PER DAY    Hx of CABG     3V CABG 2018    Hypertension     MEDS CONTROL    Neuropathy     IN FEETH FROM DM    PAD (peripheral artery disease)     Raynaud's syndrome     Status post left breast lumpectomy         Immunization History   Administered Date(s) Administered     Arexvy (RSV, Adults 60+ yrs) 10/08/2024    COVID-19 (PFIZER) 12YRS+ (COMIRNATY) 10/11/2023, 09/18/2024    COVID-19 (PFIZER) BIVALENT 12+YRS 09/28/2022    COVID-19 (PFIZER) Purple Cap Monovalent 04/10/2021, 05/01/2021, 11/22/2021    Fluzone High-Dose 65+YRS 09/19/2020, 09/19/2020, 09/28/2022, 09/18/2024    Fluzone High-Dose 65+yrs 09/19/2020, 09/28/2022, 10/11/2023    Pneumococcal Polysaccharide (PPSV23) 10/20/2020       Allergies   Allergen Reactions    Cilostazol Shortness Of Breath    Sulfa Antibiotics Hives          Current Outpatient Medications:     albuterol sulfate  (90 Base) MCG/ACT inhaler, Inhale 1 puff 2 (Two) Times a Day., Disp: , Rfl:     ASPIRIN 81 PO, Take 1 tablet/day by mouth Daily., Disp: , Rfl:     atorvastatin (LIPITOR) 10 MG tablet, Take 1 tablet by mouth Daily., Disp: , Rfl:     benzonatate (TESSALON) 200 MG capsule, Take 1 capsule by mouth 3 (Three) Times a Day As Needed for Cough., Disp: 30 capsule, Rfl: 0    Biotin 1000 MCG tablet, Take 1 tablet by mouth Daily., Disp: , Rfl:     gabapentin (NEURONTIN) 100 MG capsule, Take 1 capsule by mouth 3 (Three) Times a Day., Disp: , Rfl:     insulin glargine (LANTUS, SEMGLEE) 100 UNIT/ML injection, Inject 25 Units under the skin into the appropriate area as directed Every Night., Disp: , Rfl:     ipratropium-albuterol (DUO-NEB) 0.5-2.5 mg/3 ml nebulizer, Take 3 mL by nebulization Every 4 (Four) Hours As Needed for Wheezing or Shortness of Air., Disp: 84 mL, Rfl: 0    letrozole (FEMARA) 2.5 MG tablet, TAKE 1 TABLET BY MOUTH DAILY, Disp: 90 tablet, Rfl: 0    Magnesium 500 MG capsule, Take 1 tablet by mouth As Needed., Disp: , Rfl:     metoprolol tartrate (LOPRESSOR) 25 MG tablet, Take 1 tablet by mouth Daily., Disp: , Rfl:     montelukast (SINGULAIR) 10 MG tablet, Take 1 tablet by mouth Every Night., Disp: , Rfl:     nystatin (MYCOSTATIN) 100,000 unit/mL suspension, Take 5 mL by mouth 4 (Four) Times a Day., Disp: , Rfl:     Omega-3 Fatty Acids  "(fish oil) 1000 MG capsule capsule, Take 1 tablet by mouth Daily., Disp: , Rfl:     omeprazole (priLOSEC) 40 MG capsule, Take 1 capsule by mouth Daily As Needed (FOR ACID REFLUX)., Disp: , Rfl:     pentoxifylline (TRENtal) 400 MG CR tablet, Take 1 tablet by mouth 2 (two) times a day., Disp: , Rfl:     senna (SENOKOT) 8.6 MG tablet, Take 1 tablet by mouth Daily., Disp: , Rfl:     Spacer/Aero-Holding Chambers (Vortex Valved Holding Chamber) device, , Disp: , Rfl:     Budeson-Glycopyrrol-Formoterol (Breztri Aerosphere) 160-9-4.8 MCG/ACT aerosol inhaler, Inhale 2 puffs 2 (Two) Times a Day., Disp: 1 each, Rfl: 11    fluconazole (DIFLUCAN) 150 MG tablet, Take 1 tablet by mouth Daily. (Patient not taking: Reported on 1/24/2025), Disp: , Rfl:      Objective     Vital Signs:   /62 (BP Location: Right arm, Patient Position: Sitting, Cuff Size: Adult)   Pulse 73   Temp 98.1 °F (36.7 °C) (Oral)   Resp 18   Ht 157.5 cm (62\")   Wt 64.1 kg (141 lb 6.4 oz)   SpO2 98% Comment: room air  BMI 25.86 kg/m²     Physical Exam  Constitutional:       General: She is not in acute distress.     Appearance: Normal appearance. She is normal weight. She is not ill-appearing.   HENT:      Right Ear: Tympanic membrane and ear canal normal.      Left Ear: Tympanic membrane and ear canal normal.      Nose: Nose normal.      Mouth/Throat:      Mouth: Mucous membranes are moist.      Pharynx: Oropharynx is clear.   Cardiovascular:      Rate and Rhythm: Normal rate and regular rhythm.      Pulses: Normal pulses.      Heart sounds: Normal heart sounds.   Pulmonary:      Effort: Pulmonary effort is normal. No respiratory distress.      Breath sounds: Normal breath sounds. No stridor. No wheezing, rhonchi or rales.   Musculoskeletal:         General: No swelling. Normal range of motion.      Cervical back: Normal range of motion and neck supple.      Right lower leg: No edema.      Left lower leg: No edema.   Skin:     General: Skin is warm " and dry.   Neurological:      General: No focal deficit present.      Mental Status: She is alert and oriented to person, place, and time.      Motor: No weakness.   Psychiatric:         Mood and Affect: Mood normal.         Behavior: Behavior normal.       Result Review :   I have personally reviewed patient's labs and images.  I also reviewed Dr. Birmingham's hospital progress note 1/15/2025 and Dr. Cowart's discharge summary 1/15/2025.    Results  Laboratory Studies  Bronchoscopy samples showed no growth of bacteria, viruses, or fungi. Rhinovirus/enterovirus was detected. Lymph node biopsies: Station 10R and 4R were negative for malignant cells and abnormalities. Station 11R showed rare atypical cells.    Imaging  CT scan 1/12/2025 showed enlarged lymph nodes. PET scan in June 2024 showed a nodule in the right upper lobe that wasn't metabolically active and a few other tiny lung nodules that were too small to be evaluated.            Diagnoses and all orders for this visit:    1. Thoracic lymphadenopathy (Primary)  -     CT Chest With Contrast; Future    2. Pneumonia of right upper lobe due to infectious organism  -     CT Chest With Contrast; Future    3. Chronic obstructive pulmonary disease, unspecified COPD type  -     Budeson-Glycopyrrol-Formoterol (Breztri Aerosphere) 160-9-4.8 MCG/ACT aerosol inhaler; Inhale 2 puffs 2 (Two) Times a Day.  Dispense: 1 each; Refill: 11    4. Dyspnea on exertion  -     Budeson-Glycopyrrol-Formoterol (Breztri Aerosphere) 160-9-4.8 MCG/ACT aerosol inhaler; Inhale 2 puffs 2 (Two) Times a Day.  Dispense: 1 each; Refill: 11    5. Malignant neoplasm of lower-outer quadrant of left breast of female, estrogen receptor positive    6. Tobacco abuse    7. Encounter for smoking cessation counseling      Impression    -CT chest 1/12/2025 showed consolidation in the right upper lobe which may reflect pneumonia with reactive right hilar lymphadenopathy.  However, right hilar neoplastic process  "cannot be excluded.  Background emphysema also noted.  -EBUS bronchoscopy with Dr. Birmingham 1/14/2025 mucous plugging noted in the right upper lobe.  No growth of cultures to date.  Pneumonia panel positive for human rhinovirus/enterovirus.  Cytology of lymph node 11R showed \"rare atypical cells\".  Lymph nodes 4R and 10 R negative for malignant cells.    Assessment & Plan  1. Chronic Obstructive Pulmonary Disease (COPD).  She has been diagnosed with COPD for approximately 15 years. She reports difficulty using the Stiolto inhaler. A prescription for Breztri, to be administered as 2 puffs in the morning and 2 puffs at night, will be sent to her pharmacy. She is advised to discontinue Stiolto upon receiving the new inhaler. She should continue using her rescue inhaler and perform breathing treatments as needed. A pulmonary function test will be scheduled during her next visit to assess the severity of her COPD.    2. Pneumonia.  She recently completed her antibiotic course for pneumonia. It is noted that recovery from pneumonia can take several weeks to months, especially in patients with COPD. A CT scan will be ordered in April to ensure the pneumonia has resolved and to monitor the lung nodules.    3. Atypical Cells.  Lymph node biopsies revealed rare atypical cells in the lymph node station 11R, while 10R and 4R were negative for malignant cells. A referral back to patient's oncologist will be made for further evaluation. A CT scan will be ordered in about 3 months to monitor the lymph nodes and ensure they do not continue to enlarge. If the oncologist recommends, a PET scan may be considered.    4. Breast Cancer.  She is currently taking letrozole for breast cancer, which she has been on for five years. She has an appointment with Dr. Moore on February 13. The  will attempt to secure an earlier appointment with Dr. Moore due to the presence of atypical cells seen on FNA of lymph node 11R.    5. Smoking " Cessation.  She currently smokes half a pack a day but has cut back significantly. She is encouraged to continue using nicotine gum to aid in smoking cessation. The benefits of reducing smoking and using nicotine replacement were discussed.      Smoking status: Reviewed.  Smoking cessation counseling provided.  I spent 5 minutes today counseling patient on the risks of smoking, including throat cancer, lung cancer, COPD, heart disease and death.  Also discussed the benefits of quitting.  Vaccination status: Patient reports she is up-to-date with her flu, pneumonia, and Covid vaccines.  She has also gotten the RSV vaccine.  Patient is advised to continue to follow CDC recommendations such as social distancing wearing a mask and washing hands for at least 20 seconds.  Medications personally reviewed    Follow Up   No follow-ups on file.  Patient was given instructions and counseling regarding her condition or for health maintenance advice. Please see specific information pulled into the AVS if appropriate.

## 2025-01-27 ENCOUNTER — TELEPHONE (OUTPATIENT)
Dept: PULMONOLOGY | Facility: CLINIC | Age: 75
End: 2025-01-27
Payer: MEDICARE

## 2025-01-27 NOTE — TELEPHONE ENCOUNTER
Called and spoke with pharmacy. Copay is $30. I called and gave patient number for patient assistance.

## 2025-01-27 NOTE — TELEPHONE ENCOUNTER
[facility]o nurse called her name was Heather and stated that the patient cannot afford the Breztri. The nurse wwas wondering if there is something else that could be sent in. Please call patient

## 2025-02-03 DIAGNOSIS — Z78.0 POST-MENOPAUSAL: Primary | ICD-10-CM

## 2025-02-14 ENCOUNTER — TELEPHONE (OUTPATIENT)
Dept: ONCOLOGY | Facility: HOSPITAL | Age: 75
End: 2025-02-14
Payer: MEDICARE

## 2025-02-14 NOTE — TELEPHONE ENCOUNTER
CALLED PT TO INFORM HER THAT I WAS ABLE TO MOVE HER PET/CT AND DEXA SCAN UP PER EMILIO/ATIF. FU HAS BEEN MOVED TO AFTER SCANS.

## 2025-02-18 LAB
MYCOBACTERIUM SPEC CULT: NORMAL
NIGHT BLUE STAIN TISS: NORMAL
NIGHT BLUE STAIN TISS: NORMAL

## 2025-02-25 LAB
MYCOBACTERIUM SPEC CULT: NORMAL
NIGHT BLUE STAIN TISS: NORMAL
NIGHT BLUE STAIN TISS: NORMAL

## 2025-02-28 ENCOUNTER — HOSPITAL ENCOUNTER (OUTPATIENT)
Dept: PET IMAGING | Facility: HOSPITAL | Age: 75
Discharge: HOME | End: 2025-02-28
Payer: MEDICARE

## 2025-02-28 ENCOUNTER — TELEPHONE (OUTPATIENT)
Dept: PULMONOLOGY | Facility: CLINIC | Age: 75
End: 2025-02-28
Payer: MEDICARE

## 2025-02-28 ENCOUNTER — HOSPITAL ENCOUNTER (OUTPATIENT)
Dept: BONE DENSITY | Facility: HOSPITAL | Age: 75
Discharge: HOME | End: 2025-02-28
Payer: MEDICARE

## 2025-02-28 DIAGNOSIS — Z17.0 MALIGNANT NEOPLASM OF LOWER-OUTER QUADRANT OF LEFT BREAST OF FEMALE, ESTROGEN RECEPTOR POSITIVE: ICD-10-CM

## 2025-02-28 DIAGNOSIS — R94.8 ABNORMAL POSITRON EMISSION TOMOGRAPHY (PET) SCAN: ICD-10-CM

## 2025-02-28 DIAGNOSIS — C50.512 MALIGNANT NEOPLASM OF LOWER-OUTER QUADRANT OF LEFT BREAST OF FEMALE, ESTROGEN RECEPTOR POSITIVE: ICD-10-CM

## 2025-02-28 DIAGNOSIS — Z78.0 POST-MENOPAUSAL: ICD-10-CM

## 2025-02-28 PROCEDURE — A9552 F18 FDG: HCPCS | Performed by: INTERNAL MEDICINE

## 2025-02-28 PROCEDURE — 34310000005 FLUDEOXYGLUCOSE F18 SOLUTION: Performed by: INTERNAL MEDICINE

## 2025-02-28 PROCEDURE — 78815 PET IMAGE W/CT SKULL-THIGH: CPT

## 2025-02-28 PROCEDURE — 77080 DXA BONE DENSITY AXIAL: CPT

## 2025-02-28 RX ADMIN — FLUDEOXYGLUCOSE F 18 1 DOSE: 200 INJECTION, SOLUTION INTRAVENOUS at 08:30

## 2025-02-28 NOTE — TELEPHONE ENCOUNTER
Tried to contact patient. No answer left voicemail to return call.    HUB OKAY TO RELAY*  Please inform patient AZ&ME application for [place] has been faxed. Thank you

## 2025-03-05 NOTE — PROGRESS NOTES
Chief Complaint/Care Team   Follow-up (Left breast cancer)    Cheyenne Russell, *  Cheyenne Russell, APRN    History of Present Illness     Diagnosis: Left Breast Cancer: Invasive Lobular Carcinoma, Left UOQ, 1.3 cm; Grade 2, ER: 100%, WA: 10%, HER2: 0, Ki-67: 30%; Oncotype DX: 30    Current Treatment: Letrozole 2.5 mg PO QD began 5/2020,   Previous Treatment:     Treatment History:  - s/p LEFT lumpectomy and SLND (3/27/20)  - s/p re-excision (+) margin (1 mm residual dx found) (4/7/20)  - completed adjuvant XRT L breast: 4256 cGy (5/14-6/5/20)  - Started Femara May 2020  - DEXA August 2020-normal bone density  - DEXA 11/2022 normal density in lumbar spine and decrease density of femoral neck.  -Patient previously under the care Dr. Dawkins, transitioned care to Dr. Moore on 12/27/2023.    Gini Edmondson is a 74 y.o. female who presents to [facility] for follow-up regarding left breast cancer, patient can resume treatment with letrozole.  Patient reports hot flashes are manageable.  Patient again without any new breast concerns or adenopathy.  Patient reports compliance letrozole and denies missing any doses.  Patient underwent PET CT scan and DEXA scan and she is here to discuss those results.  She continues to smoke cigarettes.  She reports report loss of her  recently and is grieving his loss.  Patient was recent hospitalized in January 2025 due to severe pneumonia, respiratory failure, patient with chronic cough, pt reports feeling better today, but plans to follow-up with pulmonology in April 2025 with repeat CT chest.    Review of Systems   Constitutional:  Positive for fatigue.   Genitourinary:  Positive for breast pain (Random pinch feeling).        Oncology/Hematology History Overview Note   Breast Cancer: Invasive Lobular Carcinoma, Left UOQ, 1.3 cm; Grade 2, ER: 100%, WA: 10%, HER2: 0, Ki-67: 30%; Oncotype DX: 30    Treatment History:  - s/p  "LEFT lumpectomy and SLND (3/27/20)  - s/p re-excision (+) margin (1 mm residual dx found) (4/7/20)  - completed adjuvant XRT L breast: 4256 cGy (5/14-6/5/20)  - Started Femara May 2020  - DEXA August 2020-normal bone density     Malignant neoplasm of lower-outer quadrant of left breast of female, estrogen receptor positive   2/20/2020 Surgery    Surgery       Procedure:  Ultrasound guided core needle biopsy      Location:  1:00 o'clock position      Invasive lobular carcinoma  ER/TN positive   Her 2 ted negative  Ki-67 30%     3/7/2020 Cancer Staged    Staging form: Breast, AJCC 8th Edition  - Pathologic stage from 3/7/2020: Stage IA (pT1, pN0, cM0, G2, ER+, TN+, HER2-, Oncotype DX score: 30) - Signed by Chanel Almonte APRN on 7/7/2021     3/27/2020 Initial Diagnosis    Malignant neoplasm of left breast in female, estrogen receptor positive (CMS/HCC)     3/27/2020 Surgery    Surgery       Procedure:  Left breast lumpectomy with SLNB          Invasive lobular carcinoma, 13 mm, ER/TN positive, her 2 ted negative, 2/2 SLN negative, positive superior medial margin       5/14/2020 - 6/5/2020 Radiation    Radiation OncologyTreatment Course:  Gini Edmondson received 4256 cGy in 16 fractions to left breast     7/7/2020 Surgery    Surgery       Procedure:  Reexcision of positive superior margin       Pathology revealed ILC single minute focus with negative margins.          Objective     Vitals:    03/06/25 1447   BP: 123/78   Pulse: 62   Resp: 18   Temp: 98.4 °F (36.9 °C)   TempSrc: Temporal   SpO2: 91%   Weight: 65.3 kg (144 lb)   Height: 157.5 cm (62\")   PainSc: 0-No pain         ECOG score: 0         PHQ-9 Total Score:         Physical Exam  Vitals reviewed. Exam conducted with a chaperone present.   Constitutional:       General: She is not in acute distress.     Appearance: Normal appearance.   HENT:      Head: Normocephalic and atraumatic.   Eyes:      Extraocular Movements: Extraocular movements intact.     "  Conjunctiva/sclera: Conjunctivae normal.   Pulmonary:      Effort: Pulmonary effort is normal.   Musculoskeletal:      Cervical back: Normal range of motion and neck supple.   Skin:     General: Skin is warm and dry.      Findings: No bruising.   Neurological:      Mental Status: She is oriented to person, place, and time.           Past Medical History     Past Medical History:   Diagnosis Date    Allergy to sunlight     PER PT    Asthma     Cancer of left breast     INVASVIE LOBULAR CARCINOMA    Cardiac disorder     DR. MALCOLM ANTHONY    Condition not found     RIGHT EYE CATARACT (LT CATARACT REPAIRED)    Constipation     COPD (chronic obstructive pulmonary disease)     Diabetes mellitus     Elevated cholesterol     Heart attack     3-22-18 NON STEMI, CABG 3-23-18    Hx of bronchitis     SMOKES 5-7 CIGERETTS PER DAY    Hx of CABG     3V CABG 2018    Hypertension     MEDS CONTROL    Neuropathy     IN FEETH FROM DM    PAD (peripheral artery disease)     Raynaud's syndrome     Status post left breast lumpectomy      Current Outpatient Medications on File Prior to Visit   Medication Sig Dispense Refill    Accu-Chek FastClix Lancets misc USE 1 LANCET TO CHECK BLOOD SUGAR TWICE DAILY      albuterol sulfate  (90 Base) MCG/ACT inhaler Inhale 1 puff 2 (Two) Times a Day.      ASPIRIN 81 PO Take 1 tablet/day by mouth Daily.      atorvastatin (LIPITOR) 10 MG tablet Take 1 tablet by mouth Daily.      Biotin 1000 MCG tablet Take 1 tablet by mouth Daily.      Budeson-Glycopyrrol-Formoterol (Breztri Aerosphere) 160-9-4.8 MCG/ACT aerosol inhaler Inhale 2 puffs 2 (Two) Times a Day. 1 each 11    gabapentin (NEURONTIN) 100 MG capsule Take 1 capsule by mouth 3 (Three) Times a Day.      insulin glargine (LANTUS, SEMGLEE) 100 UNIT/ML injection Inject 25 Units under the skin into the appropriate area as directed Every Night.      ipratropium-albuterol (DUO-NEB) 0.5-2.5 mg/3 ml nebulizer Take 3 mL by nebulization Every 4 (Four) Hours  As Needed for Wheezing or Shortness of Air. 84 mL 0    Magnesium 500 MG capsule Take 1 tablet by mouth As Needed.      metoprolol tartrate (LOPRESSOR) 25 MG tablet Take 1 tablet by mouth Daily.      montelukast (SINGULAIR) 10 MG tablet Take 1 tablet by mouth Every Night.      nystatin (MYCOSTATIN) 100,000 unit/mL suspension Take 5 mL by mouth 4 (Four) Times a Day.      Omega-3 Fatty Acids (fish oil) 1000 MG capsule capsule Take 1 tablet by mouth Daily.      omeprazole (priLOSEC) 40 MG capsule Take 1 capsule by mouth Daily As Needed (FOR ACID REFLUX).      pentoxifylline (TRENtal) 400 MG CR tablet Take 1 tablet by mouth 2 (two) times a day.      Semaglutide,0.25 or 0.5MG/DOS, (OZEMPIC) 2 MG/3ML solution pen-injector Inject 0.5 mg under the skin into the appropriate area as directed Every 7 (Seven) Days.      senna (SENOKOT) 8.6 MG tablet Take 1 tablet by mouth Daily.      Spacer/Aero-Holding Chambers (Vortex Valved Holding Chamber) device       [DISCONTINUED] letrozole (FEMARA) 2.5 MG tablet TAKE 1 TABLET BY MOUTH DAILY 90 tablet 0    benzonatate (TESSALON) 200 MG capsule Take 1 capsule by mouth 3 (Three) Times a Day As Needed for Cough. (Patient not taking: Reported on 3/6/2025) 30 capsule 0    fluconazole (DIFLUCAN) 150 MG tablet Take 1 tablet by mouth Daily. (Patient not taking: Reported on 3/6/2025)      glipizide (GLUCOTROL XL) 2.5 MG 24 hr tablet Take 1 tablet by mouth Daily.       No current facility-administered medications on file prior to visit.      Allergies   Allergen Reactions    Cilostazol Shortness Of Breath    Sulfa Antibiotics Hives     Past Surgical History:   Procedure Laterality Date    BREAST LUMPECTOMY      LEFT    BRONCHOSCOPY N/A 1/14/2025    Procedure: BRONCHOSCOPY WITH ENDOBRONCHIAL ULTRASOUND WITH FINE NEEDLE ASPIRATIONS, BRONCHEOALVEOLAR LAVAGE, AND WASHINGS;  Surgeon: Simi Birmingham MD;  Location: [facility] ENDOSCOPY;  Service: Pulmonary;  Laterality: N/A;  lymphadenopathy    CARDIAC  CATHETERIZATION N/A 9/15/2022    Procedure: Aortogram with bilateral leg angiogram, possible angioplasty or stenting;  Surgeon: Jose Armando Mcbride MD;  Location: [facility] CATH INVASIVE LOCATION;  Service: Vascular;  Laterality: N/A;    COLONOSCOPY      COLONOSCOPY N/A 7/9/2024    Procedure: COLONOSCOPY WITH BIOPSY POLYPECTOMY;  Surgeon: Dheeraj Cho MD;  Location: [facility] ENDOSCOPY;  Service: General;  Laterality: N/A;  COLON POLYP, DIVERTICULOSIS    CORONARY ARTERY BYPASS GRAFT      LAPAROSCOPIC TUBAL LIGATION      OTHER SURGICAL HISTORY      BILAT PAD LEGS    OTHER SURGICAL HISTORY      CHIP IN LEFT BREAST, MARKER FOR SPOT    OTHER SURGICAL HISTORY      LEFT KNEE REPAIR     Social History     Socioeconomic History    Marital status:    Tobacco Use    Smoking status: Every Day     Current packs/day: 0.25     Average packs/day: 0.5 packs/day for 54.7 years (27.0 ttl pk-yrs)     Types: Cigarettes     Start date: 7/7/1970    Smokeless tobacco: Never    Tobacco comments:     7-10 CIGARETTES     LAST SMOKED 5/2 0400   Vaping Use    Vaping status: Never Used   Substance and Sexual Activity    Alcohol use: Not Currently    Drug use: Never    Sexual activity: Defer     Family History   Problem Relation Age of Onset    Stroke Mother     Prostate cancer Father     Diabetes Father     Pancreatic cancer Brother     Diabetes Brother     Breast cancer Maternal Aunt     Breast cancer Maternal Aunt     Breast cancer Paternal Aunt     Diabetes Other     Hypertension Other     Malig Hyperthermia Neg Hx        Results     Result Review   The following data was reviewed by: Karan Moore MD on 11/29/2023:  Lab Results   Component Value Date    HGB 14.6 03/06/2025    HCT 46.4 03/06/2025    MCV 87.5 03/06/2025     03/06/2025    WBC 6.25 03/06/2025    NEUTROABS 2.60 03/06/2025    LYMPHSABS 2.94 03/06/2025    MONOSABS 0.51 03/06/2025    EOSABS 0.13 03/06/2025    BASOSABS 0.06 03/06/2025     Lab Results   Component Value  Date    GLUCOSE 248 (H) 03/06/2025    BUN 6 (L) 03/06/2025    CREATININE 0.85 03/06/2025     03/06/2025    K 4.4 03/06/2025     03/06/2025    CO2 25.7 03/06/2025    CALCIUM 9.5 03/06/2025    PROTEINTOT 7.8 03/06/2025    ALBUMIN 4.3 03/06/2025    BILITOT 0.3 03/06/2025    ALKPHOS 81 03/06/2025    AST 14 03/06/2025    ALT 7 03/06/2025     Lab Results   Component Value Date    MG 1.7 01/15/2025    PHOS 3.1 01/15/2025    FREET4 0.97 04/20/2023    TSH 0.858 10/23/2020           No radiology results for the last day  NM PET/CT Skull Base to Mid Thigh    Result Date: 3/3/2025  Impression: Impression: 1. Improving right upper lobe consolidation currently measuring 3 cm. There is increased metabolic activity. SUV maximum is 4.7. This is likely an improving infectious or inflammatory process. Recommend a follow-up examination after treatment to confirm complete resolution. 2. Mild increased activity in the gastric antrum possibly secondary to gastritis. Electronically Signed: Herbert Chaudhry MD  3/3/2025 3:13 PM EST  Workstation ID: [id number]    DEXA Bone Density Axial    Result Date: 3/1/2025  Impression: Impression: Normal bone density Electronically Signed: Marin Kendrick MD  3/1/2025 10:25 AM EST  Workstation ID: [id number]     Assessment & Plan     Diagnoses and all orders for this visit:    1. Malignant neoplasm of lower-outer quadrant of left breast of female, estrogen receptor positive (Primary)  -     Cancel: CT Chest With Contrast; Future  -     CBC & Differential; Future  -     Comprehensive Metabolic Panel; Future  -     letrozole (FEMARA) 2.5 MG tablet; Take 1 tablet by mouth Daily.  Dispense: 90 tablet; Refill: 1            Gini Edmondson is a 74 y.o. female who presents to [facility]ATOLOG[facility] for follow-up regarding left HR positive breast cancer, currently on letrozole.  Patient is tolerating this medication well, most recent CBC and CMP, no evidence of toxicity on  those labs.  Recommend pt continue letrozole.    -Plan to obtain BCI now since pt is nearing end of endocrine therapy to determine if she benefits from extended endocrine therapy.    Last mammo August 21, 2024, BI-RADS 2 benign, due again August 2025,   -DEXA scan from 2/2025 revealed normal bone density, due again in 3/2027  -recommend pt continue calcium and Vit D supplementation    -CT chest from 11/14/2024 revealed stable right upper lobe nodule, pulmonary for Zima, pulmonary fibrosis, hepatic steatosis, diffuse multinodular goiter, but no axillary adenopathy appreciatedhas been lesions, will repeat CT chest low-dose in 1 year i.e. November 2025  -Given report of axillary lymphadenopathy seen on previous PET/CT,  ordered repeat PET CT scan which occurred in 2/2025, which revealed Improving right upper lobe consolidation currently measuring 3 cm. There is increased metabolic activity. SUV maximum is 4.7. This is likely an improving infectious or inflammatory process. Recommend a follow-up examination after treatment to confirm   -pt scheduled for pulmonology evaluation in 4/2025 with repeat CT chest    Plan patient follow-up in 4 months with BCI results, mammogram due in 8/2025.    Please note that portions of this note were completed with a voice recognition program.    Electronically signed by Karan Moore MD, 03/06/25, 4:49 PM EST.      Follow Up     I spent 30 minutes caring for Gini on this date of service. This time includes time spent by me in the following activities:preparing for the visit, reviewing tests, obtaining and/or reviewing a separately obtained history, performing a medically appropriate examination and/or evaluation , counseling and educating the patient/family/caregiver, ordering medications, tests, or procedures, referring and communicating with other health care professionals , documenting information in the medical record, independently interpreting results and communicating that  information with the patient/family/caregiver, and care coordination.    This is an acute or chronic illness that poses a threat to life or bodily function. The above treatment plan involves a high risk of complications and/or mortality of patient management.    The patient was seen and examined. Work by the provider also included review and/or ordering of lab tests, review and/or ordering of radiology tests, review and/or ordering of medicine tests, discussion with other physicians or providers, independent review of data, obtaining old records, review/summation of old records, and/or other review.    I have reviewed the family history, social history, and past medical history for this patient. Previous information and data has been reviewed and updated as needed. I have reviewed and verified the chief complaint, history, and other documentation. The patient was interviewed and examined in the clinic and the chart reviewed. The previous observations, recommendations, and conclusions were reviewed including those of other providers.     The plan was discussed with the patient and/or family. The patient was given time to ask questions and these questions were answered. At the conclusion of their visit they had no additional questions or concerns and all questions were answered to their satisfaction.    Patient was given instructions and counseling regarding her condition or for health maintenance advice. Please see specific information pulled into the AVS if appropriate.

## 2025-03-06 ENCOUNTER — OFFICE VISIT (OUTPATIENT)
Dept: ONCOLOGY | Facility: HOSPITAL | Age: 75
End: 2025-03-06
Payer: MEDICARE

## 2025-03-06 ENCOUNTER — LAB (OUTPATIENT)
Dept: ONCOLOGY | Facility: HOSPITAL | Age: 75
End: 2025-03-06
Payer: MEDICARE

## 2025-03-06 VITALS
TEMPERATURE: 98.4 F | HEIGHT: 62 IN | SYSTOLIC BLOOD PRESSURE: 123 MMHG | DIASTOLIC BLOOD PRESSURE: 78 MMHG | RESPIRATION RATE: 18 BRPM | OXYGEN SATURATION: 91 % | BODY MASS INDEX: 26.5 KG/M2 | WEIGHT: 144 LBS | HEART RATE: 62 BPM

## 2025-03-06 DIAGNOSIS — C50.512 MALIGNANT NEOPLASM OF LOWER-OUTER QUADRANT OF LEFT BREAST OF FEMALE, ESTROGEN RECEPTOR POSITIVE: Primary | ICD-10-CM

## 2025-03-06 DIAGNOSIS — Z17.0 MALIGNANT NEOPLASM OF LOWER-OUTER QUADRANT OF LEFT BREAST OF FEMALE, ESTROGEN RECEPTOR POSITIVE: Primary | ICD-10-CM

## 2025-03-06 DIAGNOSIS — C50.512 MALIGNANT NEOPLASM OF LOWER-OUTER QUADRANT OF LEFT BREAST OF FEMALE, ESTROGEN RECEPTOR POSITIVE: ICD-10-CM

## 2025-03-06 DIAGNOSIS — Z17.0 MALIGNANT NEOPLASM OF LOWER-OUTER QUADRANT OF LEFT BREAST OF FEMALE, ESTROGEN RECEPTOR POSITIVE: ICD-10-CM

## 2025-03-06 LAB
ALBUMIN SERPL-MCNC: 4.3 G/DL (ref 3.5–5.2)
ALBUMIN/GLOB SERPL: 1.2 G/DL
ALP SERPL-CCNC: 81 U/L (ref 39–117)
ALT SERPL W P-5'-P-CCNC: 7 U/L (ref 1–33)
ANION GAP SERPL CALCULATED.3IONS-SCNC: 9.3 MMOL/L (ref 5–15)
AST SERPL-CCNC: 14 U/L (ref 1–32)
BASOPHILS # BLD AUTO: 0.06 10*3/MM3 (ref 0–0.2)
BASOPHILS NFR BLD AUTO: 1 % (ref 0–1.5)
BILIRUB SERPL-MCNC: 0.3 MG/DL (ref 0–1.2)
BUN SERPL-MCNC: 6 MG/DL (ref 8–23)
BUN/CREAT SERPL: 7.1 (ref 7–25)
CALCIUM SPEC-SCNC: 9.5 MG/DL (ref 8.6–10.5)
CHLORIDE SERPL-SCNC: 103 MMOL/L (ref 98–107)
CO2 SERPL-SCNC: 25.7 MMOL/L (ref 22–29)
CREAT SERPL-MCNC: 0.85 MG/DL (ref 0.57–1)
DEPRECATED RDW RBC AUTO: 43.7 FL (ref 37–54)
EGFRCR SERPLBLD CKD-EPI 2021: 71.5 ML/MIN/1.73
EOSINOPHIL # BLD AUTO: 0.13 10*3/MM3 (ref 0–0.4)
EOSINOPHIL NFR BLD AUTO: 2.1 % (ref 0.3–6.2)
ERYTHROCYTE [DISTWIDTH] IN BLOOD BY AUTOMATED COUNT: 13.6 % (ref 12.3–15.4)
GLOBULIN UR ELPH-MCNC: 3.5 GM/DL
GLUCOSE SERPL-MCNC: 248 MG/DL (ref 65–99)
HCT VFR BLD AUTO: 46.4 % (ref 34–46.6)
HGB BLD-MCNC: 14.6 G/DL (ref 12–15.9)
IMM GRANULOCYTES # BLD AUTO: 0.01 10*3/MM3 (ref 0–0.05)
IMM GRANULOCYTES NFR BLD AUTO: 0.2 % (ref 0–0.5)
LYMPHOCYTES # BLD AUTO: 2.94 10*3/MM3 (ref 0.7–3.1)
LYMPHOCYTES NFR BLD AUTO: 47 % (ref 19.6–45.3)
MCH RBC QN AUTO: 27.5 PG (ref 26.6–33)
MCHC RBC AUTO-ENTMCNC: 31.5 G/DL (ref 31.5–35.7)
MCV RBC AUTO: 87.5 FL (ref 79–97)
MONOCYTES # BLD AUTO: 0.51 10*3/MM3 (ref 0.1–0.9)
MONOCYTES NFR BLD AUTO: 8.2 % (ref 5–12)
NEUTROPHILS NFR BLD AUTO: 2.6 10*3/MM3 (ref 1.7–7)
NEUTROPHILS NFR BLD AUTO: 41.5 % (ref 42.7–76)
NRBC BLD AUTO-RTO: 0 /100 WBC (ref 0–0.2)
PLATELET # BLD AUTO: 278 10*3/MM3 (ref 140–450)
PMV BLD AUTO: 9.7 FL (ref 6–12)
POTASSIUM SERPL-SCNC: 4.4 MMOL/L (ref 3.5–5.2)
PROT SERPL-MCNC: 7.8 G/DL (ref 6–8.5)
RBC # BLD AUTO: 5.3 10*6/MM3 (ref 3.77–5.28)
SODIUM SERPL-SCNC: 138 MMOL/L (ref 136–145)
WBC NRBC COR # BLD AUTO: 6.25 10*3/MM3 (ref 3.4–10.8)

## 2025-03-06 PROCEDURE — 36415 COLL VENOUS BLD VENIPUNCTURE: CPT

## 2025-03-06 PROCEDURE — 80053 COMPREHEN METABOLIC PANEL: CPT

## 2025-03-06 PROCEDURE — 85025 COMPLETE CBC W/AUTO DIFF WBC: CPT

## 2025-03-06 PROCEDURE — G0463 HOSPITAL OUTPT CLINIC VISIT: HCPCS | Performed by: INTERNAL MEDICINE

## 2025-03-06 RX ORDER — LANCETS
EACH MISCELLANEOUS
COMMUNITY
Start: 2025-02-11

## 2025-03-06 RX ORDER — GLIPIZIDE 2.5 MG/1
2.5 TABLET, EXTENDED RELEASE ORAL DAILY
COMMUNITY
Start: 2025-02-10

## 2025-03-06 RX ORDER — LETROZOLE 2.5 MG/1
2.5 TABLET, FILM COATED ORAL DAILY
Qty: 90 TABLET | Refills: 1 | Status: SHIPPED | OUTPATIENT
Start: 2025-03-06

## 2025-03-07 DIAGNOSIS — Z12.31 ENCOUNTER FOR SCREENING MAMMOGRAM FOR MALIGNANT NEOPLASM OF BREAST: Primary | ICD-10-CM

## 2025-03-13 ENCOUNTER — TELEPHONE (OUTPATIENT)
Dept: OTOLARYNGOLOGY | Facility: CLINIC | Age: 75
End: 2025-03-13

## 2025-03-13 NOTE — TELEPHONE ENCOUNTER
HUB TO READ     Appointment for today with Laura has been cancelled as she will not be in the office today. I rescheduled the appointment for 03/20/2025 at 2:00 pm. If this date and time will not work for her hub ok to reschedule next available. Thanks

## 2025-03-17 NOTE — PROGRESS NOTES
Patient Name: Gini Edmondson   Visit Date: 03/20/2025   Patient ID: [id number]  Provider: JACY Quintanilla    Sex: female  Location: [facility]   YOB: 1950  Location Address: [address] [place] [address], [address]   [place],?[place]?[address]    Primary Care Provider Cheyenne Russell APRN  Location Phone: [phone]    Referring Provider: JACY Buchanan        Chief Complaint  Dizziness and Establish Care    History of Present Illness  Gini Edmondson is a 74 y.o. female who presents to [facility] for Dizziness and Establish Care  Patient was previously seen by Dr. 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 Burnette dentistry had referred her for.  Leukoplakia was not a true leukoplakia was not a concern for Dr. Yi at that time.  It was instructed to follow-up as needed.    Patient referred to ENT on 11/1/2024 by JACY Buchanan for bilateral otalgia and dizziness.  At that time patient stated that she has felt like there was something stuck in both ears with the left being worse than the right.  Patient described pain as dull and stabbing.  Patient was positive for some nasal congestion and rhinorrhea at that time.  Patient is currently on Singulair daily.  Patient recently fell after she felt a pop in her ear and then experienced some dizziness.  Physical exam at that time was negative.  Patient was also offered a cardiac workup for the dizziness which patient declined.  Patient complains of difficulty hearing, worse in right ear than left.  Had an ear infection that she had to be hospitalized for years ago.  Denies tinnitus.  Has bilaterally EAC itching.   Denies previous ear or sinus surgeries.  Having issues with imbalance with going from sitting to standing.  Dizziness with position changes from laying to sitting, or with sitting at the computer getting ready to  "get up.   Denies syncope.   Has had several falls in the last few months without injury.  Audiogram results from today 3/20/2025 are as follows:  SRT of 30 on the right and 35 on the left.  Word discrimination scores 87% on the right and 93% on the left.  Type a tympanograms bilaterally.  Right mild sensorineural hearing loss leading into severe sensorineural hearing loss at 8000 Hz.  Left pure-tone show mild to moderate mixed hearing loss leading into severe sensorineural hearing loss at 8000 Hz.    Vital Signs:  Vitals:    03/20/25 1431   BP: 142/73   Pulse: 85   Temp: 97.6 °F (36.4 °C)   TempSrc: Temporal   Weight: 63 kg (139 lb)   Height: 157.5 cm (62\")        Past Medical History:   Diagnosis Date    Allergy to sunlight     PER PT    Asthma     Cancer of left breast     INVASVIE LOBULAR CARCINOMA    Cardiac disorder     DR. MALCOLM ANTHONY    Condition not found     RIGHT EYE CATARACT (LT CATARACT REPAIRED)    Constipation     COPD (chronic obstructive pulmonary disease)     Diabetes mellitus     Elevated cholesterol     Heart attack     3-22-18 NON STEMI, CABG 3-23-18    Hx of bronchitis     SMOKES 5-7 CIGERETTS PER DAY    Hx of CABG     3V CABG 2018    Hypertension     MEDS CONTROL    Neuropathy     IN FEETH FROM DM    PAD (peripheral artery disease)     Raynaud's syndrome     Status post left breast lumpectomy        Past Surgical History:   Procedure Laterality Date    BREAST LUMPECTOMY      LEFT    BRONCHOSCOPY N/A 1/14/2025    Procedure: BRONCHOSCOPY WITH ENDOBRONCHIAL ULTRASOUND WITH FINE NEEDLE ASPIRATIONS, BRONCHEOALVEOLAR LAVAGE, AND WASHINGS;  Surgeon: Simi Birmingham MD;  [facility]: [facility];  Service: Pulmonary;  Laterality: N/A;  lymphadenopathy    CARDIAC CATHETERIZATION N/A 9/15/2022    Procedure: Aortogram with bilateral leg angiogram, possible angioplasty or stenting;  Surgeon: Jose Armando Mcbride MD;  Location: [facility] CATH INVASIVE LOCATION;  Service: Vascular;  Laterality: N/A;    COLONOSCOPY   "    COLONOSCOPY N/A 7/9/2024    Procedure: COLONOSCOPY WITH BIOPSY POLYPECTOMY;  Surgeon: Dheeraj Cho MD;  Location: [facility] ENDOSCOPY;  Service: General;  Laterality: N/A;  COLON POLYP, DIVERTICULOSIS    CORONARY ARTERY BYPASS GRAFT      LAPAROSCOPIC TUBAL LIGATION      OTHER SURGICAL HISTORY      BILAT PAD LEGS    OTHER SURGICAL HISTORY      CHIP IN LEFT BREAST, MARKER FOR SPOT    OTHER SURGICAL HISTORY      LEFT KNEE REPAIR         Current Outpatient Medications:     Accu-Chek FastClix Lancets [facility], USE 1 LANCET TO CHECK BLOOD SUGAR TWICE DAILY, Disp: , Rfl:     albuterol sulfate  (90 Base) MCG/ACT inhaler, Inhale 1 puff 2 (Two) Times a Day., Disp: , Rfl:     ASPIRIN 81 PO, Take 1 tablet/day by mouth Daily., Disp: , Rfl:     atorvastatin (LIPITOR) 10 MG tablet, Take 1 tablet by mouth Daily., Disp: , Rfl:     Biotin 1000 MCG tablet, Take 1 tablet by mouth Daily., Disp: , Rfl:     Budeson-Glycopyrrol-Formoterol (Breztri Aerosphere) 160-9-4.8 MCG/ACT aerosol inhaler, Inhale 2 puffs 2 (Two) Times a Day., Disp: 1 each, Rfl: 11    gabapentin (NEURONTIN) 100 MG capsule, Take 1 capsule by mouth 3 (Three) Times a Day., Disp: , Rfl:     glipizide (GLUCOTROL XL) 2.5 MG 24 hr tablet, Take 1 tablet by mouth Daily., Disp: , Rfl:     insulin glargine (LANTUS, SEMGLEE) 100 UNIT/ML injection, Inject 25 Units under the skin into the appropriate area as directed Every Night., Disp: , Rfl:     ipratropium-albuterol (DUO-NEB) 0.5-2.5 mg/3 ml nebulizer, Take 3 mL by nebulization Every 4 (Four) Hours As Needed for Wheezing or Shortness of Air., Disp: 84 mL, Rfl: 0    letrozole (FEMARA) 2.5 MG tablet, Take 1 tablet by mouth Daily., Disp: 90 tablet, Rfl: 1    Magnesium 500 MG capsule, Take 1 tablet by mouth As Needed., Disp: , Rfl:     metoprolol tartrate (LOPRESSOR) 25 MG tablet, Take 1 tablet by mouth Daily., Disp: , Rfl:     montelukast (SINGULAIR) 10 MG tablet, Take 1 tablet by mouth Every Night., Disp: , Rfl:      mupirocin (BACTROBAN) 2 % ointment, Apply 1 Application topically to the appropriate area as directed 3 (Three) Times a Day., Disp: , Rfl:     nystatin (MYCOSTATIN) 100,000 unit/mL suspension, Take 5 mL by mouth 4 (Four) Times a Day., Disp: , Rfl:     Omega-3 Fatty Acids (fish oil) 1000 MG capsule capsule, Take 1 tablet by mouth Daily., Disp: , Rfl:     omeprazole (priLOSEC) 40 MG capsule, Take 1 capsule by mouth Daily As Needed (FOR ACID REFLUX)., Disp: , Rfl:     pentoxifylline (TRENtal) 400 MG CR tablet, Take 1 tablet by mouth 2 (two) times a day., Disp: , Rfl:     permethrin (ELIMITE) 5 % cream, APPLY TOPICALLY TO THE AFFECTED AREA 1 TIME FOR 1 DOSE, Disp: , Rfl:     Semaglutide,0.25 or 0.5MG/DOS, (OZEMPIC) 2 MG/3ML solution pen-injector, Inject 0.5 mg under the skin into the appropriate area as directed Every 7 (Seven) Days., Disp: , Rfl:     senna (SENOKOT) 8.6 MG tablet, Take 1 tablet by mouth Daily., Disp: , Rfl:     Spacer/Aero-Holding Chambers (Vortex Valved Holding Chamber) device, , Disp: , Rfl:     benzonatate (TESSALON) 200 MG capsule, Take 1 capsule by mouth 3 (Three) Times a Day As Needed for Cough. (Patient not taking: Reported on 3/20/2025), Disp: 30 capsule, Rfl: 0    fluconazole (DIFLUCAN) 150 MG tablet, Take 1 tablet by mouth Daily. (Patient not taking: Reported on 3/20/2025), Disp: , Rfl:     predniSONE (DELTASONE) 20 MG tablet, Take 1 tablet by mouth Daily. (Patient not taking: Reported on 3/20/2025), Disp: , Rfl:      Allergies   Allergen Reactions    Cilostazol Shortness Of Breath    Sulfa Antibiotics Hives       Social History     Tobacco Use    Smoking status: Every Day     Current packs/day: 0.25     Average packs/day: 0.5 packs/day for 54.7 years (27.0 ttl pk-yrs)     Types: Cigarettes     Start date: 7/7/1970     Passive exposure: Current    Smokeless tobacco: Never    Tobacco comments:     7-10 CIGARETTES     LAST SMOKED 5/2 0400   Vaping Use    Vaping status: Never Used   Substance  Use Topics    Alcohol use: Not Currently    Drug use: Never        Objective     Tobacco Use: High Risk (3/20/2025)    Patient History     Smoking Tobacco Use: Every Day     Smokeless Tobacco Use: Never     Passive Exposure: Current         Physical Exam    Constitutional   Appearance  well developed, well-nourished, alert and in no acute distress, voice clear and strong    Head   Inspection  no deformities or lesions, atraumatic    Face   Inspection  no facial lesions; House-Brackmann I/VI bilaterally   Palpation  no TMJ crepitus nor  muscle tenderness bilaterally     Eyes/Vision   Visual Fields  extraocular movements are intact, no spontaneous or gaze-induced nystagmus  Conjunctivae  clear   Sclerae  clear   Pupils and Irises  pupils equal, round, and reactive to light.   Nystagmus  not present     Ears, Nose, Mouth and Throat  Ears  External Ears  Auricles appearance within normal limits, no lesions present   Otoscopic Examination  tympanic membrane appearance within normal limits bilaterally without perforations, well-aerated middle ears without evidence of effusion  Hearing  intact to conversational voice both ears   Tunning fork testing    Rinne:  Ashraf:    Nose  External Nose  appearance normal   Intranasal Exam  mucosa within normal limits, vestibules normal, no intranasal lesions present, septum midline, sinuses non tender to percussion   Modified Childress Test:    Oral Cavity  Oral Mucosa  oral mucosa normal without pallor or cyanosis   Stensen's and Warthin's ducts are productive and patent with clear saliva  Lips  lip appearance normal   Teeth  normal dentition for age   Gums  gums pink, non-swollen, no bleeding present   Tongue  tongue appearance normal; normal mobility   Palate  hard palate normal, soft palate appearance normal with symmetric mobility     Throat  Oropharynx  no inflammation or lesions present  Tonsils  Bilateral tonsils unremarkable  Hypopharynx  appearance within normal limits    Larynx  voice normal     Neck  Inspection/Palpation  normal appearance, no masses or tenderness, trachea midline; thyroid size normal, nontender, no nodules or masses present on palpation     Lymphatic  Neck  no lymphadenopathy present   Supraclavicular Nodes  no lymphadenopathy present   Preauricular Nodes  no lymphadenopathy present     Respiratory  Respiratory Effort  breathing unlabored   Inspection of Chest  normal appearance, no retractions     Musculoskeletal   Cervical back: Normal range of motion and neck supple.      Skin and Subcutaneous Tissue  General Inspection  Regarding face and neck - there are no rashes present, no lesions present, and no areas of discoloration     Neurologic  Cranial Nerves  Alert and oriented x3  cranial nerves II-XII are grossly intact bilaterally   Gait and Station  normal gait, able to stand without diffculty    Psychiatric  Judgement and Insight  judgment and insight intact   Mood and Affect  mood normal, affect appropriate       RESULTS REVIEWED    I have reviewed the following information:   []  Previous Internal Note  []  Previous External Note:   []  Ordered Tests & Results:      Pathology:   Lab Results   Component Value Date    Microscopic Description  01/14/2025     Microscopic examination performed.         TSH   Date Value Ref Range Status   10/23/2020 0.858 0.470 - 4.680 u(iU)/mL Final     Comment:     (note)  Higher dose biotin supplements may interfere with this test.  Interpret results within the context of patient's clinical picture.     Free T4   Date Value Ref Range Status   04/20/2023 0.97 0.7 - 1.48 ng/dL Final     Calcium   Date Value Ref Range Status   03/06/2025 9.5 8.6 - 10.5 mg/dL Final   11/02/2022 9.7 8.4 - 10.2 mg/dL Final     25 Hydroxy, Vitamin D   Date Value Ref Range Status   11/13/2024 39.2 30.0 - 100.0 ng/ml Final   04/20/2023 47.8 >30 ng/mL Final     Comment:     New methodology as of 7/12/21. Testing performed by Chemiluminescent  Immunoassay.       NM PET/CT Skull Base to Mid Thigh  Result Date: 3/3/2025  Impression: 1. Improving right upper lobe consolidation currently measuring 3 cm. There is increased metabolic activity. SUV maximum is 4.7. This is likely an improving infectious or inflammatory process. Recommend a follow-up examination after treatment to confirm complete resolution. 2. Mild increased activity in the gastric antrum possibly secondary to gastritis. Electronically Signed: Herbert Chaudhry MD  3/3/2025 3:13 PM EST  Workstation ID: [id number]    DEXA Bone Density Axial  Result Date: 3/1/2025  Impression: Normal bone density Electronically Signed: Marin Kendrick MD  3/1/2025 10:25 AM EST  Workstation ID: [id number]    CT Chest Without Contrast Diagnostic  Result Date: 1/12/2025  Impression: Consolidation in the posterior right upper lung measuring 6.3 x 6.2 cm which may reflect pneumonia with reactive right hilar lymphadenopathy however right hilar neoplastic process cannot be excluded and treatment and follow-up imaging to ensure resolution is recommended. Follow-up chest CT should be obtained with intravenous contrast for better evaluation of right hilar lesions. Bronchial wall thickening in the right upper and right lower lung with septal thickening associated with inflammatory changes. Electronically Signed: Beatris Sarmiento MD  1/12/2025 9:37 AM EST  Workstation ID: [id number]    XR Chest 1 View  Result Date: 1/11/2025  Impression: Right upper lobe pneumonia Electronically Signed: Pascual Sarmiento MD  1/11/2025 6:30 PM EST  Workstation ID: [id number]      I have discussed the interpretation of the above results with the patient.    Procedures          Assessment and Plan   Diagnoses and all orders for this visit:    1. Dizziness (Primary)  -     Audiometry With Tympanometry    2. Cigarette smoker    3. Ear itching    4. Bilateral hearing loss, unspecified hearing loss type  -     Audiometry With Tympanometry        (R42) Dizziness -  Plan: Audiometry With Tympanometry    (F17.210) Cigarette smoker    (L29.9) Ear itching    (H91.93) Bilateral hearing loss, unspecified hearing loss type - Plan: Audiometry With Tympanometry     Gini Edmondson  reports that she has been smoking cigarettes. She started smoking about 54 years ago. She has a 27 pack-year smoking history. She has been exposed to tobacco smoke. She has never used smokeless tobacco.     I have educated her on the risk of diseases from using tobacco products such as Cancer, COPD & Heart Disease.     I advised her to quit.  I spent 3 minutes counseling the patient.       Plan:  Patient Instructions   1.  Regarding patient's bilateral ear canal itching, I have recommended hydrocortisone 1% over-the-counter on a Q-tip to bilateral EACs twice a day for a week and see if that improves her symptoms.  2.  Regarding patient's episodes of dizziness, patient is also complaining of some decreased hearing possibly worse on the right than the left side.  With Susy-Hallpike exam, patient is negative for nystagmus but is having significant dizziness on both sides with left greater than right.  Patient does not describe it as a rotational vertigo but more of a swimmy headed dizziness sensation.  Recommend patient follow-up with her primary care provider for orthostatic blood pressure observation and possibly a tilt table test.  Patient has a significant history of cardiac disease with hypertension and peripheral artery disease.  3.  Audiogram results from today are as follows: Type a tympanograms.  Bilateral mild sensorineural hearing loss leading into severe sensorineural hearing loss at 8000 Hz.  Left ear does have a mild mixed component.  4.  I am not convinced that the patient has true vertigo at this time.  I recommend patient hydrate adequately and ensure that she is having slow transition of movement from lying to sitting to standing.  -Also recommend patient obtain hearing aid evaluation  whenever she is ready.  -Patient needs to use hearing protection for any motorized equipment including gunfire, tractors, fireworks, anything with the motor.  5.  I will see patient back in 8 weeks for reevaluation and we will repeat Susy-Hallpike exam at that time.    Return in about 8 weeks (around 5/15/2025).  Patient was given instructions and counseling regarding her condition or for health maintenance advice. Please see specific information pulled into the AVS if appropriate.      All or a portion of this Note was dictated utilizing Dragon Dictation.

## 2025-03-20 ENCOUNTER — OFFICE VISIT (OUTPATIENT)
Dept: OTOLARYNGOLOGY | Facility: CLINIC | Age: 75
End: 2025-03-20
Payer: MEDICARE

## 2025-03-20 ENCOUNTER — PROCEDURE VISIT (OUTPATIENT)
Dept: OTOLARYNGOLOGY | Facility: CLINIC | Age: 75
End: 2025-03-20
Payer: MEDICARE

## 2025-03-20 VITALS
HEIGHT: 62 IN | TEMPERATURE: 97.6 F | SYSTOLIC BLOOD PRESSURE: 142 MMHG | DIASTOLIC BLOOD PRESSURE: 73 MMHG | HEART RATE: 85 BPM | BODY MASS INDEX: 25.58 KG/M2 | WEIGHT: 139 LBS

## 2025-03-20 DIAGNOSIS — H91.93 BILATERAL HEARING LOSS, UNSPECIFIED HEARING LOSS TYPE: ICD-10-CM

## 2025-03-20 DIAGNOSIS — H90.A32 MIXED CONDUCTIVE AND SENSORINEURAL HEARING LOSS OF LEFT EAR WITH RESTRICTED HEARING OF RIGHT EAR: ICD-10-CM

## 2025-03-20 DIAGNOSIS — R42 DIZZINESS: Primary | ICD-10-CM

## 2025-03-20 DIAGNOSIS — F17.210 CIGARETTE SMOKER: ICD-10-CM

## 2025-03-20 DIAGNOSIS — H92.03 OTALGIA, BILATERAL: ICD-10-CM

## 2025-03-20 DIAGNOSIS — L29.9 EAR ITCHING: ICD-10-CM

## 2025-03-20 DIAGNOSIS — H90.A21 SENSORINEURAL HEARING LOSS (SNHL) OF RIGHT EAR WITH RESTRICTED HEARING OF LEFT EAR: ICD-10-CM

## 2025-03-20 RX ORDER — PERMETHRIN 50 MG/G
CREAM TOPICAL
COMMUNITY
Start: 2025-03-14

## 2025-03-20 RX ORDER — MUPIROCIN 20 MG/G
1 OINTMENT TOPICAL 3 TIMES DAILY
COMMUNITY
Start: 2025-03-14

## 2025-03-20 RX ORDER — PREDNISONE 20 MG/1
1 TABLET ORAL DAILY
COMMUNITY
Start: 2025-03-14

## 2025-03-20 NOTE — PATIENT INSTRUCTIONS
1.  Regarding patient's bilateral ear canal itching, I have recommended hydrocortisone 1% over-the-counter on a Q-tip to bilateral EACs twice a day for a week and see if that improves her symptoms.  2.  Regarding patient's episodes of dizziness, patient is also complaining of some decreased hearing possibly worse on the right than the left side.  With Susy-Hallpike exam, patient is negative for nystagmus but is having significant dizziness on both sides with left greater than right.  Patient does not describe it as a rotational vertigo but more of a swimmy headed dizziness sensation.  Recommend patient follow-up with her primary care provider for orthostatic blood pressure observation and possibly a tilt table test.  Patient has a significant history of cardiac disease with hypertension and peripheral artery disease.  3.  Audiogram results from today are as follows: Type a tympanograms.  Bilateral mild sensorineural hearing loss leading into severe sensorineural hearing loss at 8000 Hz.  Left ear does have a mild mixed component.  4.  I am not convinced that the patient has true vertigo at this time.  I recommend patient hydrate adequately and ensure that she is having slow transition of movement from lying to sitting to standing.  -Also recommend patient obtain hearing aid evaluation whenever she is ready.  -Patient needs to use hearing protection for any motorized equipment including gunfire, tractors, fireworks, anything with the motor.  5.  I will see patient back in 8 weeks for reevaluation and we will repeat [place]air[place]-Hallpike exam at that time.

## 2025-04-10 ENCOUNTER — HOSPITAL ENCOUNTER (OUTPATIENT)
Dept: OTHER | Facility: HOSPITAL | Age: 75
Discharge: HOME | End: 2025-04-10
Payer: MEDICARE

## 2025-04-14 ENCOUNTER — TRANSCRIBE ORDERS (OUTPATIENT)
Dept: ADMINISTRATIVE | Facility: HOSPITAL | Age: 75
End: 2025-04-14
Payer: MEDICARE

## 2025-04-14 DIAGNOSIS — E04.2 MULTINODULAR THYROID: Primary | ICD-10-CM

## 2025-05-01 ENCOUNTER — TELEPHONE (OUTPATIENT)
Dept: PULMONOLOGY | Facility: CLINIC | Age: 75
End: 2025-05-01
Payer: MEDICARE

## 2025-05-01 NOTE — TELEPHONE ENCOUNTER
Tried to contact patient no answer left voicemail to return call.    Need to inform patient of CT appt and get follow-up scheduled after CT for patient to see Hoda Gentile,APRN

## 2025-05-02 ENCOUNTER — HOSPITAL ENCOUNTER (OUTPATIENT)
Dept: ULTRASOUND IMAGING | Facility: HOSPITAL | Age: 75
Discharge: HOME | End: 2025-05-02
Payer: MEDICARE

## 2025-05-02 DIAGNOSIS — E04.2 MULTIPLE THYROID NODULES: ICD-10-CM

## 2025-05-02 DIAGNOSIS — E04.2 MULTINODULAR THYROID: ICD-10-CM

## 2025-05-02 PROCEDURE — 76942 ECHO GUIDE FOR BIOPSY: CPT

## 2025-05-02 PROCEDURE — 25010000002 LIDOCAINE 1 % SOLUTION

## 2025-05-02 RX ORDER — LIDOCAINE HYDROCHLORIDE 10 MG/ML
INJECTION, SOLUTION INFILTRATION; PERINEURAL
Status: COMPLETED
Start: 2025-05-02 | End: 2025-05-02

## 2025-05-02 RX ADMIN — LIDOCAINE HYDROCHLORIDE: 10 INJECTION, SOLUTION INFILTRATION; PERINEURAL at 14:06

## 2025-05-08 ENCOUNTER — HOSPITAL ENCOUNTER (OUTPATIENT)
Dept: CT IMAGING | Facility: HOSPITAL | Age: 75
Discharge: HOME | End: 2025-05-08
Payer: MEDICARE

## 2025-05-08 DIAGNOSIS — J18.9 PNEUMONIA OF RIGHT UPPER LOBE DUE TO INFECTIOUS ORGANISM: ICD-10-CM

## 2025-05-08 DIAGNOSIS — R59.0 THORACIC LYMPHADENOPATHY: ICD-10-CM

## 2025-05-08 LAB
CREAT BLDA-MCNC: 0.8 MG/DL (ref 0.6–1.3)
EGFRCR SERPLBLD CKD-EPI 2021: 76.9 ML/MIN/1.73

## 2025-05-08 PROCEDURE — 82565 ASSAY OF CREATININE: CPT

## 2025-05-08 PROCEDURE — 71260 CT THORAX DX C+: CPT

## 2025-05-08 PROCEDURE — 25510000001 IOPAMIDOL PER 1 ML

## 2025-05-08 RX ORDER — IOPAMIDOL 755 MG/ML
100 INJECTION, SOLUTION INTRAVASCULAR
Status: COMPLETED | OUTPATIENT
Start: 2025-05-08 | End: 2025-05-08

## 2025-05-08 RX ADMIN — IOPAMIDOL 100 ML: 755 INJECTION, SOLUTION INTRAVENOUS at 11:55

## 2025-05-09 NOTE — PROGRESS NOTES
Primary Care Provider  Cheyenne Russell APRN   Referring Provider  No ref. provider found      Patient Complaint  COPD, Asthma, Follow-up, Results (CT), and Shortness of Breath      Subjective          Gini Edmondson presents to [facility]    Patient or patient representative verbalized consent for the use of Ambient Listening during the visit with  JACY Joseph for chart documentation. 5/23/2025  12:02 EST    History of Presenting Illness  Gini Edmondson is a 74 y.o. female patient with COPD/emphysema, recent pneumonia, thoracic lymphadenopathy, history of breast cancer, and tobacco use ongoing, here for 4 month follow-up.    Patient states she is well since her last visit, here today to go over repeat chest CT results.  She was last seen after being hospitalized January 2025 for acute on chronic hypoxic respiratory failure, right upper lobe pneumonia.  She also had some enlarged lymph nodes around her lungs at that time.  Patient underwent EBUS bronchoscopy during her hospitalization, which was inconclusive, showing atypical cells.  Today we discussed that her repeat chest CT showed improvement in area of consolidation, marked improvement in enlarged thoracic lymph node, and stable lung nodule.  Patient was diagnosed with COPD about 15 years ago.  This was her first hospitalization for respiratory issues.  Patient denies any fevers or chills.  Her primary respiratory complaint is shortness of breath with exertion.  Patient has been using Breztri twice daily since her last visit, was previously on Stiolto which she had difficulty operating.  She also uses her albuterol inhaler and DuoNeb treatments as needed.  She is a current smoker, has cut back to half a pack per day, 27 pack years.  She feels it may be difficult for her to quit as she states that smoking helps manage her anxiety and nervousness.  Patient denies any hemoptysis, swollen  lymph nodes, weight loss, or night sweats.  Patient continues to follow-up with Dr. Moore with oncology for management of breast cancer in remission, has been taking letrozole for the past 5 years.  Overall, patient is doing well and has no additional concerns at this time.  Patient is able to perform ADLs without difficulty.  I have personally reviewed the review of systems, past family, social, medical and surgical histories; and agree with their findings.    Pulmonary Meds  Breztri  Albuterol inhaler  DuoNebs    Pulmonary equipment  None      Family History   Problem Relation Age of Onset    Stroke Mother     Prostate cancer Father     Diabetes Father     Pancreatic cancer Brother     Diabetes Brother     Breast cancer Maternal Aunt     Breast cancer Maternal Aunt     Breast cancer Paternal Aunt     Diabetes Other     Hypertension Other     Malig Hyperthermia Neg Hx         Social History     Socioeconomic History    Marital status:    Tobacco Use    Smoking status: Every Day     Current packs/day: 0.25     Average packs/day: 0.5 packs/day for 54.9 years (27.1 ttl pk-yrs)     Types: Cigarettes     Start date: 7/7/1970     Passive exposure: Current    Smokeless tobacco: Never    Tobacco comments:     7-10 CIGARETTES     LAST SMOKED 5/2 0400   Vaping Use    Vaping status: Never Used   Substance and Sexual Activity    Alcohol use: Not Currently    Drug use: Never    Sexual activity: Defer        Past Medical History:   Diagnosis Date    Allergy to sunlight     PER PT    Asthma     Cancer of left breast     INVASVIE LOBULAR CARCINOMA    Cardiac disorder     DR. MALCOLM ANTHONY    Condition not found     RIGHT EYE CATARACT (LT CATARACT REPAIRED)    Constipation     COPD (chronic obstructive pulmonary disease)     Diabetes mellitus     Elevated cholesterol     Heart attack     3-22-18 NON STEMI, CABG 3-23-18    Hx of bronchitis     SMOKES 5-7 CIGERETTS PER DAY    Hx of CABG     3V CABG 2018    Hypertension     MEDS  CONTROL    Neuropathy     IN FEETH FROM DM    PAD (peripheral artery disease)     Raynaud's syndrome     Status post left breast lumpectomy         Immunization History   Administered Date(s) Administered    Arexvy (RSV, Adults 60+ yrs) 10/08/2024    COVID-19 (PFIZER) 12YRS+ (COMIRNATY) 10/11/2023, 09/18/2024    COVID-19 (PFIZER) BIVALENT 12+YRS 09/28/2022    COVID-19 (PFIZER) Purple Cap Monovalent 04/10/2021, 05/01/2021, 11/22/2021    Fluzone High-Dose 65+YRS 09/19/2020, 09/19/2020, 09/28/2022, 09/18/2024    Fluzone High-Dose 65+yrs 09/19/2020, 09/28/2022, 10/11/2023    Pneumococcal Polysaccharide (PPSV23) 10/20/2020    Shingrix 05/14/2025       Allergies   Allergen Reactions    Cilostazol Shortness Of Breath    Sulfa Antibiotics Hives          Current Outpatient Medications:     Accu-Chek FastClix Lancets misc, USE 1 LANCET TO CHECK BLOOD SUGAR TWICE DAILY, Disp: , Rfl:     albuterol sulfate  (90 Base) MCG/ACT inhaler, Inhale 1 puff 2 (Two) Times a Day., Disp: , Rfl:     ASPIRIN 81 PO, Take 1 tablet/day by mouth Daily., Disp: , Rfl:     atorvastatin (LIPITOR) 10 MG tablet, Take 1 tablet by mouth Daily., Disp: , Rfl:     benzonatate (TESSALON) 200 MG capsule, Take 1 capsule by mouth 3 (Three) Times a Day As Needed for Cough., Disp: 30 capsule, Rfl: 0    Biotin 1000 MCG tablet, Take 1 tablet by mouth Daily., Disp: , Rfl:     Budeson-Glycopyrrol-Formoterol (Breztri Aerosphere) 160-9-4.8 MCG/ACT aerosol inhaler, Inhale 2 puffs 2 (Two) Times a Day., Disp: 1 each, Rfl: 11    Fluocinolone Acetonide Scalp 0.01 % oil, APPLY TOPICALLY TO THE SCALP EVERY NIGHT AT BEDTIME, Disp: , Rfl:     gabapentin (NEURONTIN) 100 MG capsule, Take 1 capsule by mouth 3 (Three) Times a Day., Disp: , Rfl:     gentamicin (GARAMYCIN) 0.1 % cream, APPLY TOPICALLY TO THE AFFECTED AREA TWICE DAILY FOR 2 WEEKS, Disp: , Rfl:     glipizide (GLUCOTROL XL) 2.5 MG 24 hr tablet, Take 1 tablet by mouth Daily., Disp: , Rfl:     hydrOXYzine (ATARAX)  25 MG tablet, Take 1 tablet by mouth Every 8 (Eight) Hours As Needed for Itching., Disp: , Rfl:     insulin glargine (LANTUS, SEMGLEE) 100 UNIT/ML injection, Inject 25 Units under the skin into the appropriate area as directed Every Night., Disp: , Rfl:     ipratropium-albuterol (DUO-NEB) 0.5-2.5 mg/3 ml nebulizer, Take 3 mL by nebulization Every 4 (Four) Hours As Needed for Wheezing or Shortness of Air., Disp: 84 mL, Rfl: 0    letrozole (FEMARA) 2.5 MG tablet, Take 1 tablet by mouth Daily., Disp: 90 tablet, Rfl: 1    Magnesium 500 MG capsule, Take 1 tablet by mouth As Needed., Disp: , Rfl:     metoprolol tartrate (LOPRESSOR) 25 MG tablet, Take 1 tablet by mouth Daily., Disp: , Rfl:     montelukast (SINGULAIR) 10 MG tablet, Take 1 tablet by mouth Every Night., Disp: , Rfl:     mupirocin (BACTROBAN) 2 % ointment, Apply 1 Application topically to the appropriate area as directed 3 (Three) Times a Day., Disp: , Rfl:     Omega-3 Fatty Acids (fish oil) 1000 MG capsule capsule, Take 1 tablet by mouth Daily., Disp: , Rfl:     omeprazole (priLOSEC) 40 MG capsule, Take 1 capsule by mouth Daily As Needed (FOR ACID REFLUX)., Disp: , Rfl:     permethrin (ELIMITE) 5 % cream, APPLY TOPICALLY TO THE AFFECTED AREA 1 TIME FOR 1 DOSE, Disp: , Rfl:     Semaglutide,0.25 or 0.5MG/DOS, (OZEMPIC) 2 MG/3ML solution pen-injector, Inject 0.5 mg under the skin into the appropriate area as directed Every 7 (Seven) Days., Disp: , Rfl:     senna (SENOKOT) 8.6 MG tablet, Take 1 tablet by mouth Daily., Disp: , Rfl:     Spacer/Aero-Holding Chambers (Vortex Valved Holding Chamber) device, , Disp: , Rfl:     triamcinolone (KENALOG) 0.5 % cream, Apply  topically to the appropriate area as directed., Disp: , Rfl:     fluconazole (DIFLUCAN) 150 MG tablet, Take 1 tablet by mouth Daily. (Patient not taking: Reported on 5/23/2025), Disp: , Rfl:     nystatin (MYCOSTATIN) 100,000 unit/mL suspension, Swish and swallow 5 mL 4 (Four) Times a Day., Disp: 473 mL,  "Rfl: 0    pentoxifylline (TRENtal) 400 MG CR tablet, Take 1 tablet by mouth 2 (two) times a day., Disp: , Rfl:     predniSONE (DELTASONE) 20 MG tablet, Take 1 tablet by mouth Daily. (Patient not taking: Reported on 3/20/2025), Disp: , Rfl:      Objective     Vital Signs:   /84 (BP Location: Right arm, Patient Position: Sitting, Cuff Size: Adult)   Pulse 80   Temp 98 °F (36.7 °C) (Tympanic)   Resp 18   Ht 157.5 cm (62\")   Wt 60.3 kg (133 lb)   SpO2 99% Comment: ROOM AIR  BMI 24.33 kg/m²     Physical Exam  Constitutional:       General: She is not in acute distress.     Appearance: Normal appearance. She is normal weight. She is not ill-appearing.   HENT:      Right Ear: Tympanic membrane and ear canal normal.      Left Ear: Tympanic membrane and ear canal normal.      Nose: Nose normal.      Mouth/Throat:      Mouth: Mucous membranes are moist.      Pharynx: Oropharynx is clear.   Cardiovascular:      Rate and Rhythm: Normal rate and regular rhythm.      Pulses: Normal pulses.      Heart sounds: Normal heart sounds.   Pulmonary:      Effort: Pulmonary effort is normal. No respiratory distress.      Breath sounds: Normal breath sounds. No stridor. No wheezing, rhonchi or rales.   Musculoskeletal:         General: No swelling. Normal range of motion.      Cervical back: Normal range of motion and neck supple.      Right lower leg: No edema.      Left lower leg: No edema.   Skin:     General: Skin is warm and dry.   Neurological:      General: No focal deficit present.      Mental Status: She is alert and oriented to person, place, and time.      Motor: No weakness.   Psychiatric:         Mood and Affect: Mood normal.         Behavior: Behavior normal.       Result Review :   I have personally reviewed patient's labs and images.  I also reviewed my last progress note 1/24/2025.    Results    -CT chest 1/12/2025 showed consolidation in the right upper lobe which may reflect pneumonia with reactive right " "hilar lymphadenopathy.  However, right hilar neoplastic process cannot be excluded.  Background emphysema also noted.  -EBUS bronchoscopy with Dr. Birmingham 1/14/2025 mucous plugging noted in the right upper lobe.  No growth of cultures.  Pneumonia panel positive for human rhinovirus/enterovirus.  Cytology of lymph node 11R showed \"rare atypical cells\".  Lymph nodes 4R and 10 R negative for malignant cells.  -Repeat chest CT 5/8/2025 showed improved aeration of the right upper lobe with a little bit of residual consolidation.  Right upper lobe lung nodule was stable.  Right suprahilar lymph node was markedly improved.  Emphysema also present.         Diagnoses and all orders for this visit:    1. Thoracic lymphadenopathy (Primary)    2. Pneumonia of right upper lobe due to infectious organism    3. Chronic obstructive pulmonary disease, unspecified COPD type    4. Dyspnea on exertion    5. Malignant neoplasm of lower-outer quadrant of left breast of female, estrogen receptor positive    6. Tobacco abuse    7. Encounter for smoking cessation counseling    8. Oral thrush  -     nystatin (MYCOSTATIN) 100,000 unit/mL suspension; Swish and swallow 5 mL 4 (Four) Times a Day.  Dispense: 473 mL; Refill: 0      Assessment & Plan    1. Chronic Obstructive Pulmonary Disease (COPD).  She has been diagnosed with COPD for approximately 15 years. She reported difficulty using her Stiolto inhaler at her last visit and subsequently switched to Breztri. She should continue using her Breztri 2 puffs twice daily and her rescue inhaler and breathing treatments as needed. A pulmonary function test will be ordered today to assess the severity of her COPD. Nystatin prescribed for oral thrush, reminded patient to rinse mouth after each use.    2. Pneumonia.  She recently completed her antibiotic course for pneumonia. It is noted that recovery from pneumonia can take several weeks to months, especially in patients with COPD. A repeat chest CT " 5/8/2025 showed improved aeration of the right lung with a little bit of residual consolidation.  Pulmonary nodule was stable.    3. Atypical Cells.  Lymph node biopsies revealed rare atypical cells in the lymph node station 11R, while 10R and 4R were negative for malignant cells. A referral back to patient's oncologist was made last visit.    4. Breast Cancer.  She is currently taking letrozole for breast cancer, which she has been on for five years. She continues to follow-up with Dr. Moore with oncology.    Follow up in 6 months, may return sooner if needed    Smoking status: Reviewed.  Smoking cessation counseling provided.  I spent 5 minutes today counseling patient on the risks of smoking, including throat cancer, lung cancer, COPD, heart disease and death.  Also discussed the benefits of quitting.  Vaccination status: Patient reports she is up-to-date with her flu, pneumonia, and Covid vaccines.  She has also gotten the RSV vaccine.  Patient is advised to continue to follow CDC recommendations such as social distancing wearing a mask and washing hands for at least 20 seconds.  Medications personally reviewed    Follow Up   No follow-ups on file.  Patient was given instructions and counseling regarding her condition or for health maintenance advice. Please see specific information pulled into the AVS if appropriate.

## 2025-05-23 ENCOUNTER — OFFICE VISIT (OUTPATIENT)
Dept: PULMONOLOGY | Facility: CLINIC | Age: 75
End: 2025-05-23
Payer: MEDICARE

## 2025-05-23 VITALS
HEART RATE: 80 BPM | BODY MASS INDEX: 24.48 KG/M2 | WEIGHT: 133 LBS | TEMPERATURE: 98 F | OXYGEN SATURATION: 99 % | SYSTOLIC BLOOD PRESSURE: 144 MMHG | DIASTOLIC BLOOD PRESSURE: 84 MMHG | RESPIRATION RATE: 18 BRPM | HEIGHT: 62 IN

## 2025-05-23 DIAGNOSIS — Z71.6 ENCOUNTER FOR SMOKING CESSATION COUNSELING: ICD-10-CM

## 2025-05-23 DIAGNOSIS — C50.512 MALIGNANT NEOPLASM OF LOWER-OUTER QUADRANT OF LEFT BREAST OF FEMALE, ESTROGEN RECEPTOR POSITIVE: ICD-10-CM

## 2025-05-23 DIAGNOSIS — Z17.0 MALIGNANT NEOPLASM OF LOWER-OUTER QUADRANT OF LEFT BREAST OF FEMALE, ESTROGEN RECEPTOR POSITIVE: ICD-10-CM

## 2025-05-23 DIAGNOSIS — J44.9 CHRONIC OBSTRUCTIVE PULMONARY DISEASE, UNSPECIFIED COPD TYPE: ICD-10-CM

## 2025-05-23 DIAGNOSIS — B37.0 ORAL THRUSH: ICD-10-CM

## 2025-05-23 DIAGNOSIS — J18.9 PNEUMONIA OF RIGHT UPPER LOBE DUE TO INFECTIOUS ORGANISM: ICD-10-CM

## 2025-05-23 DIAGNOSIS — R06.09 DYSPNEA ON EXERTION: ICD-10-CM

## 2025-05-23 DIAGNOSIS — R59.0 THORACIC LYMPHADENOPATHY: Primary | ICD-10-CM

## 2025-05-23 DIAGNOSIS — Z72.0 TOBACCO ABUSE: ICD-10-CM

## 2025-05-23 PROCEDURE — 1159F MED LIST DOCD IN RCRD: CPT

## 2025-05-23 PROCEDURE — 3077F SYST BP >= 140 MM HG: CPT

## 2025-05-23 PROCEDURE — 3079F DIAST BP 80-89 MM HG: CPT

## 2025-05-23 PROCEDURE — 99214 OFFICE O/P EST MOD 30 MIN: CPT

## 2025-05-23 PROCEDURE — 1160F RVW MEDS BY RX/DR IN RCRD: CPT

## 2025-05-23 RX ORDER — FLUOCINOLONE ACETONIDE 0.11 MG/ML
OIL TOPICAL
COMMUNITY
Start: 2025-05-07

## 2025-05-23 RX ORDER — NYSTATIN 100000 [USP'U]/ML
500000 SUSPENSION ORAL 4 TIMES DAILY
Qty: 473 ML | Refills: 0 | Status: SHIPPED | OUTPATIENT
Start: 2025-05-23

## 2025-05-23 RX ORDER — TRIAMCINOLONE ACETONIDE 5 MG/G
CREAM TOPICAL
COMMUNITY
Start: 2025-04-08

## 2025-05-23 RX ORDER — GENTAMICIN SULFATE 1 MG/G
CREAM TOPICAL
COMMUNITY
Start: 2025-05-14

## 2025-05-23 RX ORDER — HYDROXYZINE HYDROCHLORIDE 25 MG/1
25 TABLET, FILM COATED ORAL EVERY 8 HOURS PRN
COMMUNITY

## 2025-07-25 DIAGNOSIS — B37.0 ORAL THRUSH: ICD-10-CM

## 2025-07-28 RX ORDER — NYSTATIN 100000 [USP'U]/ML
5 SUSPENSION ORAL 4 TIMES DAILY
Qty: 473 ML | Refills: 0 | Status: SHIPPED | OUTPATIENT
Start: 2025-07-28

## 2025-08-19 ENCOUNTER — OFFICE VISIT (OUTPATIENT)
Dept: WOUND CARE | Facility: HOSPITAL | Age: 75
End: 2025-08-19
Payer: MEDICARE

## 2025-08-19 VITALS
DIASTOLIC BLOOD PRESSURE: 59 MMHG | HEART RATE: 103 BPM | SYSTOLIC BLOOD PRESSURE: 133 MMHG | TEMPERATURE: 98.5 F | RESPIRATION RATE: 18 BRPM

## 2025-08-19 DIAGNOSIS — L03.116 CELLULITIS OF LEFT LOWER EXTREMITY: Primary | ICD-10-CM

## 2025-08-19 DIAGNOSIS — L98.499 ARTERIAL INSUFFICIENCY WITH ISCHEMIC ULCER: ICD-10-CM

## 2025-08-19 DIAGNOSIS — R21 RASH: ICD-10-CM

## 2025-08-19 DIAGNOSIS — I77.1 ARTERIAL INSUFFICIENCY WITH ISCHEMIC ULCER: ICD-10-CM

## 2025-08-19 PROBLEM — Z86.0100 HISTORY OF COLONIC POLYPS: Status: ACTIVE | Noted: 2024-05-08

## 2025-08-19 PROCEDURE — G0463 HOSPITAL OUTPT CLINIC VISIT: HCPCS | Performed by: PHYSICIAN ASSISTANT

## 2025-08-19 RX ORDER — DOXYCYCLINE HYCLATE 100 MG
1 TABLET ORAL EVERY 12 HOURS SCHEDULED
COMMUNITY
Start: 2025-08-14

## 2025-08-19 RX ORDER — SILVER SULFADIAZINE 10 MG/G
1 CREAM TOPICAL 2 TIMES DAILY
Qty: 60 G | Refills: 0 | Status: SHIPPED | OUTPATIENT
Start: 2025-08-19 | End: 2025-09-18

## 2025-08-19 RX ORDER — TRIAMCINOLONE ACETONIDE 1 MG/G
1 OINTMENT TOPICAL 2 TIMES DAILY
Qty: 30 G | Refills: 0 | Status: SHIPPED | OUTPATIENT
Start: 2025-08-19

## 2025-08-19 RX ORDER — QUETIAPINE FUMARATE 25 MG/1
25 TABLET, FILM COATED ORAL NIGHTLY
COMMUNITY
Start: 2025-08-14

## 2025-08-20 ENCOUNTER — HOSPITAL ENCOUNTER (EMERGENCY)
Facility: HOSPITAL | Age: 75
Discharge: HOME | End: 2025-08-20
Attending: EMERGENCY MEDICINE
Payer: MEDICARE

## 2025-08-20 ENCOUNTER — CLINICAL SUPPORT (OUTPATIENT)
Dept: WOUND CARE | Facility: HOSPITAL | Age: 75
End: 2025-08-20
Payer: MEDICARE

## 2025-08-20 VITALS
TEMPERATURE: 97.3 F | RESPIRATION RATE: 18 BRPM | DIASTOLIC BLOOD PRESSURE: 50 MMHG | HEART RATE: 93 BPM | SYSTOLIC BLOOD PRESSURE: 109 MMHG

## 2025-08-20 DIAGNOSIS — I77.1 ARTERIAL INSUFFICIENCY WITH ISCHEMIC ULCER: ICD-10-CM

## 2025-08-20 DIAGNOSIS — L98.499 ARTERIAL INSUFFICIENCY WITH ISCHEMIC ULCER: ICD-10-CM

## 2025-08-20 DIAGNOSIS — R21 RASH: Primary | ICD-10-CM

## 2025-08-20 PROCEDURE — G0463 HOSPITAL OUTPT CLINIC VISIT: HCPCS | Performed by: PHYSICIAN ASSISTANT

## 2025-08-21 ENCOUNTER — PATIENT ROUNDING (BHMG ONLY) (OUTPATIENT)
Dept: WOUND CARE | Facility: HOSPITAL | Age: 75
End: 2025-08-21
Payer: MEDICARE

## 2025-08-21 ENCOUNTER — CLINICAL SUPPORT (OUTPATIENT)
Dept: WOUND CARE | Facility: HOSPITAL | Age: 75
End: 2025-08-21
Payer: MEDICARE

## 2025-08-21 ENCOUNTER — HOSPITAL ENCOUNTER (OUTPATIENT)
Dept: CT IMAGING | Facility: HOSPITAL | Age: 75
Discharge: HOME | End: 2025-08-21
Admitting: PHYSICIAN ASSISTANT
Payer: MEDICARE

## 2025-08-21 VITALS
DIASTOLIC BLOOD PRESSURE: 83 MMHG | TEMPERATURE: 97.4 F | HEART RATE: 86 BPM | RESPIRATION RATE: 18 BRPM | SYSTOLIC BLOOD PRESSURE: 119 MMHG

## 2025-08-21 DIAGNOSIS — L98.499 ARTERIAL INSUFFICIENCY WITH ISCHEMIC ULCER: ICD-10-CM

## 2025-08-21 DIAGNOSIS — R21 RASH: Primary | ICD-10-CM

## 2025-08-21 DIAGNOSIS — I77.1 ARTERIAL INSUFFICIENCY WITH ISCHEMIC ULCER: ICD-10-CM

## 2025-08-21 DIAGNOSIS — L03.116 CELLULITIS OF LEFT LOWER EXTREMITY: ICD-10-CM

## 2025-08-21 LAB
CREAT BLDA-MCNC: 0.6 MG/DL (ref 0.6–1.3)
EGFRCR SERPLBLD CKD-EPI 2021: 93.2 ML/MIN/1.73

## 2025-08-21 PROCEDURE — 25510000001 IOPAMIDOL PER 1 ML: Performed by: PHYSICIAN ASSISTANT

## 2025-08-21 PROCEDURE — G0463 HOSPITAL OUTPT CLINIC VISIT: HCPCS | Performed by: PHYSICIAN ASSISTANT

## 2025-08-21 PROCEDURE — 82565 ASSAY OF CREATININE: CPT

## 2025-08-21 PROCEDURE — 75635 CT ANGIO ABDOMINAL ARTERIES: CPT

## 2025-08-21 RX ORDER — IOPAMIDOL 755 MG/ML
100 INJECTION, SOLUTION INTRAVASCULAR
Status: COMPLETED | OUTPATIENT
Start: 2025-08-21 | End: 2025-08-21

## 2025-08-21 RX ADMIN — IOPAMIDOL 100 ML: 755 INJECTION, SOLUTION INTRAVENOUS at 13:53

## 2025-08-22 ENCOUNTER — CLINICAL SUPPORT (OUTPATIENT)
Dept: WOUND CARE | Facility: HOSPITAL | Age: 75
End: 2025-08-22
Payer: MEDICARE

## 2025-08-22 ENCOUNTER — HOSPITAL ENCOUNTER (OUTPATIENT)
Dept: MAMMOGRAPHY | Facility: HOSPITAL | Age: 75
Discharge: HOME | End: 2025-08-22
Payer: MEDICARE

## 2025-08-22 VITALS
SYSTOLIC BLOOD PRESSURE: 121 MMHG | DIASTOLIC BLOOD PRESSURE: 49 MMHG | HEART RATE: 90 BPM | TEMPERATURE: 97.4 F | RESPIRATION RATE: 16 BRPM

## 2025-08-22 DIAGNOSIS — R21 RASH: Primary | ICD-10-CM

## 2025-08-22 DIAGNOSIS — I77.1 ARTERIAL INSUFFICIENCY WITH ISCHEMIC ULCER: ICD-10-CM

## 2025-08-22 DIAGNOSIS — Z12.31 ENCOUNTER FOR SCREENING MAMMOGRAM FOR MALIGNANT NEOPLASM OF BREAST: ICD-10-CM

## 2025-08-22 DIAGNOSIS — L98.499 ARTERIAL INSUFFICIENCY WITH ISCHEMIC ULCER: ICD-10-CM

## 2025-08-22 PROCEDURE — 77063 BREAST TOMOSYNTHESIS BI: CPT

## 2025-08-22 PROCEDURE — 77067 SCR MAMMO BI INCL CAD: CPT

## 2025-08-22 PROCEDURE — G0463 HOSPITAL OUTPT CLINIC VISIT: HCPCS | Performed by: PHYSICIAN ASSISTANT

## 2025-08-25 ENCOUNTER — OFFICE VISIT (OUTPATIENT)
Age: 75
End: 2025-08-25
Payer: MEDICARE

## 2025-08-25 ENCOUNTER — CLINICAL SUPPORT (OUTPATIENT)
Dept: WOUND CARE | Facility: HOSPITAL | Age: 75
End: 2025-08-25
Payer: MEDICARE

## 2025-08-25 VITALS
HEART RATE: 101 BPM | TEMPERATURE: 96.1 F | DIASTOLIC BLOOD PRESSURE: 61 MMHG | SYSTOLIC BLOOD PRESSURE: 113 MMHG | RESPIRATION RATE: 18 BRPM

## 2025-08-25 VITALS
OXYGEN SATURATION: 95 % | HEART RATE: 101 BPM | DIASTOLIC BLOOD PRESSURE: 61 MMHG | SYSTOLIC BLOOD PRESSURE: 113 MMHG | TEMPERATURE: 96.1 F | RESPIRATION RATE: 18 BRPM

## 2025-08-25 DIAGNOSIS — L98.499 ARTERIAL INSUFFICIENCY WITH ISCHEMIC ULCER: ICD-10-CM

## 2025-08-25 DIAGNOSIS — I77.1 ARTERIAL INSUFFICIENCY WITH ISCHEMIC ULCER: ICD-10-CM

## 2025-08-25 DIAGNOSIS — I70.25 ATHEROSCLEROSIS OF NATIVE ARTERIES OF THE EXTREMITIES WITH ULCERATION: Primary | ICD-10-CM

## 2025-08-25 DIAGNOSIS — R21 RASH: Primary | ICD-10-CM

## 2025-08-25 PROCEDURE — G0463 HOSPITAL OUTPT CLINIC VISIT: HCPCS | Performed by: PHYSICIAN ASSISTANT

## 2025-08-26 ENCOUNTER — CLINICAL SUPPORT (OUTPATIENT)
Dept: WOUND CARE | Facility: HOSPITAL | Age: 75
End: 2025-08-26
Payer: MEDICARE

## 2025-08-26 VITALS
SYSTOLIC BLOOD PRESSURE: 132 MMHG | RESPIRATION RATE: 16 BRPM | TEMPERATURE: 96.9 F | HEART RATE: 91 BPM | DIASTOLIC BLOOD PRESSURE: 57 MMHG

## 2025-08-26 DIAGNOSIS — L98.499 ARTERIAL INSUFFICIENCY WITH ISCHEMIC ULCER: ICD-10-CM

## 2025-08-26 DIAGNOSIS — R21 RASH: Primary | ICD-10-CM

## 2025-08-26 DIAGNOSIS — I77.1 ARTERIAL INSUFFICIENCY WITH ISCHEMIC ULCER: ICD-10-CM

## 2025-08-26 PROCEDURE — G0463 HOSPITAL OUTPT CLINIC VISIT: HCPCS | Performed by: PHYSICIAN ASSISTANT

## 2025-08-27 ENCOUNTER — APPOINTMENT (OUTPATIENT)
Dept: ONCOLOGY | Facility: HOSPITAL | Age: 75
End: 2025-08-27
Payer: MEDICARE

## 2025-08-27 ENCOUNTER — CLINICAL SUPPORT (OUTPATIENT)
Dept: WOUND CARE | Facility: HOSPITAL | Age: 75
End: 2025-08-27
Payer: MEDICARE

## 2025-08-27 VITALS
DIASTOLIC BLOOD PRESSURE: 57 MMHG | RESPIRATION RATE: 18 BRPM | HEART RATE: 94 BPM | TEMPERATURE: 96.5 F | SYSTOLIC BLOOD PRESSURE: 117 MMHG

## 2025-08-27 DIAGNOSIS — I77.1 ARTERIAL INSUFFICIENCY WITH ISCHEMIC ULCER: Primary | ICD-10-CM

## 2025-08-27 DIAGNOSIS — L03.116 CELLULITIS OF LEFT LOWER EXTREMITY: ICD-10-CM

## 2025-08-27 DIAGNOSIS — L98.499 ARTERIAL INSUFFICIENCY WITH ISCHEMIC ULCER: Primary | ICD-10-CM

## 2025-08-27 DIAGNOSIS — R06.09 DYSPNEA ON EXERTION: ICD-10-CM

## 2025-08-27 DIAGNOSIS — J44.9 CHRONIC OBSTRUCTIVE PULMONARY DISEASE, UNSPECIFIED COPD TYPE: ICD-10-CM

## 2025-08-27 PROCEDURE — G0463 HOSPITAL OUTPT CLINIC VISIT: HCPCS | Performed by: PHYSICIAN ASSISTANT

## 2025-08-27 RX ORDER — BUDESONIDE, GLYCOPYRROLATE, AND FORMOTEROL FUMARATE 160; 9; 4.8 UG/1; UG/1; UG/1
2 AEROSOL, METERED RESPIRATORY (INHALATION) 2 TIMES DAILY
Qty: 3 EACH | Refills: 1 | Status: SHIPPED | OUTPATIENT
Start: 2025-08-27

## 2025-08-29 ENCOUNTER — TELEPHONE (OUTPATIENT)
Dept: ONCOLOGY | Facility: HOSPITAL | Age: 75
End: 2025-08-29
Payer: MEDICARE

## 2025-08-29 ENCOUNTER — CLINICAL SUPPORT (OUTPATIENT)
Dept: WOUND CARE | Facility: HOSPITAL | Age: 75
End: 2025-08-29
Payer: MEDICARE

## 2025-08-29 VITALS
DIASTOLIC BLOOD PRESSURE: 63 MMHG | TEMPERATURE: 96.7 F | HEART RATE: 74 BPM | RESPIRATION RATE: 16 BRPM | SYSTOLIC BLOOD PRESSURE: 121 MMHG

## 2025-08-29 DIAGNOSIS — L98.499 ARTERIAL INSUFFICIENCY WITH ISCHEMIC ULCER: ICD-10-CM

## 2025-08-29 DIAGNOSIS — I77.1 ARTERIAL INSUFFICIENCY WITH ISCHEMIC ULCER: ICD-10-CM

## 2025-08-29 DIAGNOSIS — R21 RASH: Primary | ICD-10-CM

## 2025-08-29 PROCEDURE — G0463 HOSPITAL OUTPT CLINIC VISIT: HCPCS | Performed by: PHYSICIAN ASSISTANT

## (undated) DEVICE — Device: Brand: BALLOON

## (undated) DEVICE — SOL IRRG H2O PL/BG 1000ML STRL

## (undated) DEVICE — SINGLE USE SUCTION VALVE MAJ-209: Brand: SINGLE USE SUCTION VALVE (STERILE)

## (undated) DEVICE — CONN JET HYDRA H20 AUXILIARY DISP

## (undated) DEVICE — STPCK 1WY SPINLOCK FML LL NONDEHP LF .20ML

## (undated) DEVICE — TUBING, SUCTION, 1/4" X 10', STRAIGHT: Brand: MEDLINE

## (undated) DEVICE — STERILE POLYISOPRENE POWDER-FREE SURGICAL GLOVES: Brand: PROTEXIS

## (undated) DEVICE — NAVICROSS SUPPORT CATHETER: Brand: NAVICROSS

## (undated) DEVICE — SOLIDIFIER LIQLOC PLS 1500CC BT

## (undated) DEVICE — DEV ATOMIZATION MUCOSAL/NASALTRACH

## (undated) DEVICE — LINER SURG CANSTR SXN S/RIGD 1500CC

## (undated) DEVICE — TRAP,MUCUS SPECIMEN,40CC: Brand: MEDLINE

## (undated) DEVICE — SINGLE-USE BIOPSY FORCEPS: Brand: RADIAL JAW 4

## (undated) DEVICE — INTRO SHEATH PRELUDE/PRO .035 5F 11X50CM GRY LF

## (undated) DEVICE — ST NDL BRONCH ASP VIZISHOT 2 FLX 19GA

## (undated) DEVICE — DESTINATION RENAL GUIDING SHEATH: Brand: DESTINATION

## (undated) DEVICE — CONTAINER,SPEC,PNEUM TUBE,4OZ,STRL PATH: Brand: MEDLINE

## (undated) DEVICE — SOL IRR H2O BTL 1000ML STRL

## (undated) DEVICE — KT INTRO MIC VSI SMOTH STFF 4F 40CM 7CM

## (undated) DEVICE — Device

## (undated) DEVICE — GW STARTER JB STR .035 15X180CM

## (undated) DEVICE — STERILE POLYISOPRENE POWDER-FREE SURGICAL GLOVES WITH EMOLLIENT COATING: Brand: PROTEXIS

## (undated) DEVICE — DISPOSABLE CYTOLOGY BRUSH: Brand: DISPOSABLE CYTOLOGY BRUSH

## (undated) DEVICE — CVR PROB ULTRASND GLS STRL

## (undated) DEVICE — BLCK/BITE BLOX WO/DENTL/RIM W/STRAP 54F

## (undated) DEVICE — Device: Brand: DEFENDO AIR/WATER/SUCTION AND BIOPSY VALVE

## (undated) DEVICE — SINGLE USE BIOPSY VALVE MAJ-210: Brand: SINGLE USE BIOPSY VALVE (STERILE)

## (undated) DEVICE — SYR LUER SLPTP 50ML

## (undated) DEVICE — BALN SFT VU COBRA2 .035IN 5F 65CM

## (undated) DEVICE — CATH OMNI FLUSH 5FR

## (undated) DEVICE — RADIFOCUS GLIDEWIRE: Brand: GLIDEWIRE